# Patient Record
Sex: FEMALE | Race: WHITE | NOT HISPANIC OR LATINO | Employment: UNEMPLOYED | URBAN - METROPOLITAN AREA
[De-identification: names, ages, dates, MRNs, and addresses within clinical notes are randomized per-mention and may not be internally consistent; named-entity substitution may affect disease eponyms.]

---

## 2021-02-22 ENCOUNTER — APPOINTMENT (OUTPATIENT)
Dept: CT IMAGING | Facility: CLINIC | Age: 56
End: 2021-02-22
Attending: EMERGENCY MEDICINE
Payer: COMMERCIAL

## 2021-02-22 ENCOUNTER — HOSPITAL ENCOUNTER (INPATIENT)
Facility: CLINIC | Age: 56
LOS: 10 days | Discharge: HOME | End: 2021-03-04
Attending: EMERGENCY MEDICINE | Admitting: PSYCHIATRY & NEUROLOGY
Payer: COMMERCIAL

## 2021-02-22 DIAGNOSIS — R46.89 UNUSUAL CHANGE IN BEHAVIOR: ICD-10-CM

## 2021-02-22 DIAGNOSIS — Z11.52 ENCOUNTER FOR SCREENING LABORATORY TESTING FOR SEVERE ACUTE RESPIRATORY SYNDROME CORONAVIRUS 2 (SARS-COV-2): ICD-10-CM

## 2021-02-22 DIAGNOSIS — F29 PSYCHOSIS, UNSPECIFIED PSYCHOSIS TYPE (H): ICD-10-CM

## 2021-02-22 LAB
ALBUMIN SERPL-MCNC: 3.8 G/DL (ref 3.4–5)
ALBUMIN UR-MCNC: 10 MG/DL
ALP SERPL-CCNC: 67 U/L (ref 40–150)
ALT SERPL W P-5'-P-CCNC: 20 U/L (ref 0–50)
AMPHETAMINES UR QL SCN: NEGATIVE
ANION GAP SERPL CALCULATED.3IONS-SCNC: 9 MMOL/L (ref 3–14)
APPEARANCE UR: CLEAR
AST SERPL W P-5'-P-CCNC: 19 U/L (ref 0–45)
BACTERIA #/AREA URNS HPF: ABNORMAL /HPF
BARBITURATES UR QL: NEGATIVE
BASOPHILS # BLD AUTO: 0 10E9/L (ref 0–0.2)
BASOPHILS NFR BLD AUTO: 0.5 %
BENZODIAZ UR QL: NEGATIVE
BILIRUB SERPL-MCNC: 1.2 MG/DL (ref 0.2–1.3)
BILIRUB UR QL STRIP: NEGATIVE
BUN SERPL-MCNC: 13 MG/DL (ref 7–30)
CALCIUM SERPL-MCNC: 9 MG/DL (ref 8.5–10.1)
CANNABINOIDS UR QL SCN: POSITIVE
CHLORIDE SERPL-SCNC: 108 MMOL/L (ref 94–109)
CO2 SERPL-SCNC: 25 MMOL/L (ref 20–32)
COCAINE UR QL: NEGATIVE
COLOR UR AUTO: YELLOW
CREAT SERPL-MCNC: 0.84 MG/DL (ref 0.52–1.04)
DIFFERENTIAL METHOD BLD: NORMAL
EOSINOPHIL # BLD AUTO: 0.1 10E9/L (ref 0–0.7)
EOSINOPHIL NFR BLD AUTO: 0.8 %
ERYTHROCYTE [DISTWIDTH] IN BLOOD BY AUTOMATED COUNT: 11.9 % (ref 10–15)
ETHANOL SERPL-MCNC: <0.01 G/DL
ETHANOL UR QL SCN: NEGATIVE
GFR SERPL CREATININE-BSD FRML MDRD: 78 ML/MIN/{1.73_M2}
GLUCOSE SERPL-MCNC: 101 MG/DL (ref 70–99)
GLUCOSE UR STRIP-MCNC: NEGATIVE MG/DL
HCT VFR BLD AUTO: 44.6 % (ref 35–47)
HGB BLD-MCNC: 15.4 G/DL (ref 11.7–15.7)
HGB UR QL STRIP: NEGATIVE
HYALINE CASTS #/AREA URNS LPF: 3 /LPF (ref 0–2)
IMM GRANULOCYTES # BLD: 0 10E9/L (ref 0–0.4)
IMM GRANULOCYTES NFR BLD: 0.3 %
INR PPP: 1.02 (ref 0.86–1.14)
KETONES UR STRIP-MCNC: NEGATIVE MG/DL
LABORATORY COMMENT REPORT: NORMAL
LEUKOCYTE ESTERASE UR QL STRIP: NEGATIVE
LYMPHOCYTES # BLD AUTO: 1.9 10E9/L (ref 0.8–5.3)
LYMPHOCYTES NFR BLD AUTO: 25.6 %
MCH RBC QN AUTO: 32.7 PG (ref 26.5–33)
MCHC RBC AUTO-ENTMCNC: 34.5 G/DL (ref 31.5–36.5)
MCV RBC AUTO: 95 FL (ref 78–100)
MONOCYTES # BLD AUTO: 0.8 10E9/L (ref 0–1.3)
MONOCYTES NFR BLD AUTO: 10.6 %
MUCOUS THREADS #/AREA URNS LPF: PRESENT /LPF
NEUTROPHILS # BLD AUTO: 4.7 10E9/L (ref 1.6–8.3)
NEUTROPHILS NFR BLD AUTO: 62.2 %
NITRATE UR QL: NEGATIVE
NRBC # BLD AUTO: 0 10*3/UL
NRBC BLD AUTO-RTO: 0 /100
OPIATES UR QL SCN: NEGATIVE
PH UR STRIP: 5.5 PH (ref 5–7)
PLATELET # BLD AUTO: 306 10E9/L (ref 150–450)
POTASSIUM SERPL-SCNC: 3.5 MMOL/L (ref 3.4–5.3)
PROT SERPL-MCNC: 7.7 G/DL (ref 6.8–8.8)
RBC # BLD AUTO: 4.71 10E12/L (ref 3.8–5.2)
RBC #/AREA URNS AUTO: 1 /HPF (ref 0–2)
SARS-COV-2 RNA RESP QL NAA+PROBE: NEGATIVE
SODIUM SERPL-SCNC: 142 MMOL/L (ref 133–144)
SOURCE: ABNORMAL
SP GR UR STRIP: 1.02 (ref 1–1.03)
SPECIMEN SOURCE: NORMAL
SQUAMOUS #/AREA URNS AUTO: 4 /HPF (ref 0–1)
TSH SERPL DL<=0.005 MIU/L-ACNC: 1.36 MU/L (ref 0.4–4)
UROBILINOGEN UR STRIP-MCNC: NORMAL MG/DL (ref 0–2)
WBC # BLD AUTO: 7.6 10E9/L (ref 4–11)
WBC #/AREA URNS AUTO: 7 /HPF (ref 0–5)

## 2021-02-22 PROCEDURE — 80053 COMPREHEN METABOLIC PANEL: CPT | Performed by: EMERGENCY MEDICINE

## 2021-02-22 PROCEDURE — 250N000013 HC RX MED GY IP 250 OP 250 PS 637: Performed by: STUDENT IN AN ORGANIZED HEALTH CARE EDUCATION/TRAINING PROGRAM

## 2021-02-22 PROCEDURE — 70450 CT HEAD/BRAIN W/O DYE: CPT

## 2021-02-22 PROCEDURE — 81001 URINALYSIS AUTO W/SCOPE: CPT | Performed by: EMERGENCY MEDICINE

## 2021-02-22 PROCEDURE — 80320 DRUG SCREEN QUANTALCOHOLS: CPT | Performed by: EMERGENCY MEDICINE

## 2021-02-22 PROCEDURE — 85610 PROTHROMBIN TIME: CPT | Performed by: EMERGENCY MEDICINE

## 2021-02-22 PROCEDURE — 82077 ASSAY SPEC XCP UR&BREATH IA: CPT | Performed by: EMERGENCY MEDICINE

## 2021-02-22 PROCEDURE — 84443 ASSAY THYROID STIM HORMONE: CPT | Performed by: EMERGENCY MEDICINE

## 2021-02-22 PROCEDURE — 99285 EMERGENCY DEPT VISIT HI MDM: CPT | Mod: 25 | Performed by: EMERGENCY MEDICINE

## 2021-02-22 PROCEDURE — 87635 SARS-COV-2 COVID-19 AMP PRB: CPT | Performed by: EMERGENCY MEDICINE

## 2021-02-22 PROCEDURE — 99285 EMERGENCY DEPT VISIT HI MDM: CPT | Performed by: EMERGENCY MEDICINE

## 2021-02-22 PROCEDURE — 124N000002 HC R&B MH UMMC

## 2021-02-22 PROCEDURE — 85025 COMPLETE CBC W/AUTO DIFF WBC: CPT | Performed by: EMERGENCY MEDICINE

## 2021-02-22 PROCEDURE — 80307 DRUG TEST PRSMV CHEM ANLYZR: CPT | Performed by: EMERGENCY MEDICINE

## 2021-02-22 PROCEDURE — C9803 HOPD COVID-19 SPEC COLLECT: HCPCS | Performed by: EMERGENCY MEDICINE

## 2021-02-22 RX ORDER — ACETAMINOPHEN 325 MG/1
325-650 TABLET ORAL EVERY 6 HOURS PRN
COMMUNITY
End: 2021-02-22

## 2021-02-22 RX ORDER — MAGNESIUM HYDROXIDE/ALUMINUM HYDROXICE/SIMETHICONE 120; 1200; 1200 MG/30ML; MG/30ML; MG/30ML
30 SUSPENSION ORAL EVERY 4 HOURS PRN
Status: DISCONTINUED | OUTPATIENT
Start: 2021-02-22 | End: 2021-03-04 | Stop reason: HOSPADM

## 2021-02-22 RX ORDER — LANOLIN ALCOHOL/MO/W.PET/CERES
3 CREAM (GRAM) TOPICAL
Status: DISCONTINUED | OUTPATIENT
Start: 2021-02-22 | End: 2021-03-04 | Stop reason: HOSPADM

## 2021-02-22 RX ORDER — HYDROXYZINE HYDROCHLORIDE 25 MG/1
25 TABLET, FILM COATED ORAL EVERY 4 HOURS PRN
Status: DISCONTINUED | OUTPATIENT
Start: 2021-02-22 | End: 2021-03-04 | Stop reason: HOSPADM

## 2021-02-22 RX ORDER — IBUPROFEN 200 MG
200 TABLET ORAL EVERY 4 HOURS PRN
COMMUNITY
End: 2024-01-03

## 2021-02-22 RX ORDER — AMOXICILLIN 500 MG/1
500 CAPSULE ORAL 3 TIMES DAILY
Status: ON HOLD | COMMUNITY
End: 2021-03-04

## 2021-02-22 RX ORDER — OLANZAPINE 10 MG/2ML
10 INJECTION, POWDER, FOR SOLUTION INTRAMUSCULAR 3 TIMES DAILY PRN
Status: DISCONTINUED | OUTPATIENT
Start: 2021-02-22 | End: 2021-02-26

## 2021-02-22 RX ORDER — OLANZAPINE 10 MG/1
10 TABLET ORAL 3 TIMES DAILY PRN
Status: DISCONTINUED | OUTPATIENT
Start: 2021-02-22 | End: 2021-02-26

## 2021-02-22 RX ORDER — ACETAMINOPHEN 325 MG/1
650 TABLET ORAL EVERY 4 HOURS PRN
Status: DISCONTINUED | OUTPATIENT
Start: 2021-02-22 | End: 2021-03-04 | Stop reason: HOSPADM

## 2021-02-22 RX ADMIN — MELATONIN TAB 3 MG 3 MG: 3 TAB at 22:20

## 2021-02-22 RX ADMIN — HYDROXYZINE HYDROCHLORIDE 25 MG: 25 TABLET, FILM COATED ORAL at 22:20

## 2021-02-22 RX ADMIN — Medication 12.5 MG: at 23:43

## 2021-02-22 ASSESSMENT — ACTIVITIES OF DAILY LIVING (ADL)
WALKING_OR_CLIMBING_STAIRS_DIFFICULTY: YES
ADL_ASSESSMENT: WDL
TOILETING_ISSUES: NO
HEARING_DIFFICULTY_OR_DEAF: NO
CONCENTRATING,_REMEMBERING_OR_MAKING_DECISIONS_DIFFICULTY: NO
WEAR_GLASSES_OR_BLIND: YES
DRESSING/BATHING_DIFFICULTY: NO
DIFFICULTY_EATING/SWALLOWING: NO
WHICH_OF_THE_ABOVE_FUNCTIONAL_RISKS_HAD_A_RECENT_ONSET_OR_CHANGE?: COGNITION
DIFFICULTY_COMMUNICATING: NO
FALL_HISTORY_WITHIN_LAST_SIX_MONTHS: NO
DOING_ERRANDS_INDEPENDENTLY_DIFFICULTY: NO

## 2021-02-22 ASSESSMENT — ENCOUNTER SYMPTOMS
CONFUSION: 1
HYPERACTIVE: 1
HEADACHES: 1
SLEEP DISTURBANCE: 1
DECREASED CONCENTRATION: 1

## 2021-02-22 NOTE — ED PROVIDER NOTES
[facility] EMERGENCY DEPARTMENT ([facility])     February 22, 2021  History     Chief Complaint   Patient presents with     Insomnia     Manic Behavior     HPI  Radha James is a 55-year-old female who has a previous history of some depression in the remote past but has no previous psychiatric hospitalizations.  Patient lately has been acting strangely according to family members and friends.  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  Patient states that she is very good at math and art and she is in her Renaissance phase of her life.  Patient states she wants to semi-retire but is not old enough to completely retire yet.  Patient has been having some strange behavior lately in that she has gone to the neighbors in her bathrobe and has been expressing some strange thoughts for her... Alternate realities and different dimensions.  Patient admits that this behavior is unusual and thinks there might be something wrong with her and wants to get checked.  Patient does have a family history of bipolar disease and her brother does have manic episodes.  The patient has never been diagnosed with bipolar disease.  Patient denies any chemical dependency issues and states that she has had some mild headaches lately and maybe some blurred vision.  Patient agreed to come to the emergency department because family and friends expressed that she has been acting strangely.  The patient has exhibited rambling speech and has not been sleeping for the past week.       I have reviewed the Medications, Allergies, Past Medical and Surgical History, and Social History in the [facility] system.    PAST MEDICAL HISTORY: History reviewed. No pertinent past medical history.    PAST SURGICAL HISTORY: History reviewed. No pertinent surgical history.    Past medical history, past surgical history, medications, and allergies were reviewed with the  patient. Additional pertinent items: None    FAMILY HISTORY: History reviewed. No pertinent family history.    SOCIAL HISTORY:   Social History     Tobacco Use     Smoking status: Current Every Day Smoker     Types: Vaping Device     Smokeless tobacco: Never Used   Substance Use Topics     Alcohol use: Yes     Alcohol/week: 1.0 standard drinks     Types: 1 Shots of liquor per week     Comment: socially once a month     Social history was reviewed with the patient. Additional pertinent items: None      Patient's Medications    ACETAMINOPHEN (TYLENOL) 325 MG TABLET    Take 325-650 mg by mouth every 6 hours as needed for mild pain    AMOXICILLIN (AMOXIL) 500 MG CAPSULE    Take 500 mg by mouth 2 times daily    IBUPROFEN (ADVIL/MOTRIN) 200 MG TABLET    Take 200 mg by mouth every 4 hours as needed for mild pain        No Known Allergies     Review of Systems   Eyes: Positive for visual disturbance (blurred).   Neurological: Positive for headaches (mild).   Psychiatric/Behavioral: Positive for behavioral problems, confusion, decreased concentration and sleep disturbance. The patient is hyperactive.    All other systems reviewed and are negative.    A complete review of systems was performed with pertinent positives and negatives noted in the HPI, and all other systems negative.    Physical Exam   BP: (!) 172/91  Pulse: 76  Temp: 98.8  F (37.1  C)  Resp: 18  SpO2: 100 %      Physical Exam  Vitals signs and nursing note reviewed.   Constitutional:       Comments: Conversant pleasant but continues to ramble on   HENT:      Head: Atraumatic.   Eyes:      Extraocular Movements: Extraocular movements intact.      Pupils: Pupils are equal, round, and reactive to light.   Neck:      Musculoskeletal: Neck supple.   Cardiovascular:      Rate and Rhythm: Regular rhythm.      Heart sounds: Normal heart sounds.   Pulmonary:      Breath sounds: Normal breath sounds.   Abdominal:      Palpations: Abdomen is soft.      Tenderness: There  is no abdominal tenderness.   Musculoskeletal:         General: No deformity.   Skin:     General: Skin is warm.   Neurological:      General: No focal deficit present.      Mental Status: She is alert and oriented to person, place, and time.      Comments: Grossly intact and symmetric   Psychiatric:      Comments: Free spirited.  Almost flower-child like.         ED Course   1:40 PM  The patient was seen and examined by Reji Early MD in Room ED09.        Procedures          Patient agreed to a medical work-up.    Results for orders placed or performed during the hospital encounter of 02/22/21 (from the past 24 hour(s))   UA with Microscopic reflex to Culture    Specimen: Urine clean catch; Midstream Urine   Result Value Ref Range    Color Urine Yellow     Appearance Urine Clear     Glucose Urine Negative NEG^Negative mg/dL    Bilirubin Urine Negative NEG^Negative    Ketones Urine Negative NEG^Negative mg/dL    Specific Gravity Urine 1.023 1.003 - 1.035    Blood Urine Negative NEG^Negative    pH Urine 5.5 5.0 - 7.0 pH    Protein Albumin Urine 10 (A) NEG^Negative mg/dL    Urobilinogen mg/dL Normal 0.0 - 2.0 mg/dL    Nitrite Urine Negative NEG^Negative    Leukocyte Esterase Urine Negative NEG^Negative    Source Midstream Urine     WBC Urine 7 (H) 0 - 5 /HPF    RBC Urine 1 0 - 2 /HPF    Bacteria Urine Few (A) NEG^Negative /HPF    Squamous Epithelial /HPF Urine 4 (H) 0 - 1 /HPF    Mucous Urine Present (A) NEG^Negative /LPF    Hyaline Casts 3 (H) 0 - 2 /LPF   Drug abuse screen 6 urine (chem dep)   Result Value Ref Range    Amphetamine Qual Urine Negative NEG^Negative    Barbiturates Qual Urine Negative NEG^Negative    Benzodiazepine Qual Urine Negative NEG^Negative    Cannabinoids Qual Urine Positive (A) NEG^Negative    Cocaine Qual Urine Negative NEG^Negative    Ethanol Qual Urine Negative NEG^Negative    Opiates Qualitative Urine Negative NEG^Negative   CBC with platelets differential   Result Value Ref Range     WBC 7.6 4.0 - 11.0 10e9/L    RBC Count 4.71 3.8 - 5.2 10e12/L    Hemoglobin 15.4 11.7 - 15.7 g/dL    Hematocrit 44.6 35.0 - 47.0 %    MCV 95 78 - 100 fl    MCH 32.7 26.5 - 33.0 pg    MCHC 34.5 31.5 - 36.5 g/dL    RDW 11.9 10.0 - 15.0 %    Platelet Count 306 150 - 450 10e9/L    Diff Method Automated Method     % Neutrophils 62.2 %    % Lymphocytes 25.6 %    % Monocytes 10.6 %    % Eosinophils 0.8 %    % Basophils 0.5 %    % Immature Granulocytes 0.3 %    Nucleated RBCs 0 0 /100    Absolute Neutrophil 4.7 1.6 - 8.3 10e9/L    Absolute Lymphocytes 1.9 0.8 - 5.3 10e9/L    Absolute Monocytes 0.8 0.0 - 1.3 10e9/L    Absolute Eosinophils 0.1 0.0 - 0.7 10e9/L    Absolute Basophils 0.0 0.0 - 0.2 10e9/L    Abs Immature Granulocytes 0.0 0 - 0.4 10e9/L    Absolute Nucleated RBC 0.0    INR   Result Value Ref Range    INR 1.02 0.86 - 1.14   Comprehensive metabolic panel   Result Value Ref Range    Sodium 142 133 - 144 mmol/L    Potassium 3.5 3.4 - 5.3 mmol/L    Chloride 108 94 - 109 mmol/L    Carbon Dioxide 25 20 - 32 mmol/L    Anion Gap 9 3 - 14 mmol/L    Glucose 101 (H) 70 - 99 mg/dL    Urea Nitrogen 13 7 - 30 mg/dL    Creatinine 0.84 0.52 - 1.04 mg/dL    GFR Estimate 78 >60 mL/min/[1.73_m2]    GFR Estimate If Black >90 >60 mL/min/[1.73_m2]    Calcium 9.0 8.5 - 10.1 mg/dL    Bilirubin Total 1.2 0.2 - 1.3 mg/dL    Albumin 3.8 3.4 - 5.0 g/dL    Protein Total 7.7 6.8 - 8.8 g/dL    Alkaline Phosphatase 67 40 - 150 U/L    ALT 20 0 - 50 U/L    AST 19 0 - 45 U/L   TSH   Result Value Ref Range    TSH 1.36 0.40 - 4.00 mU/L   Alcohol ethyl   Result Value Ref Range    Ethanol g/dL <0.01 <0.01 g/dL   Head CT w/o contrast    Narrative    CT SCAN OF THE HEAD WITHOUT CONTRAST   2/22/2021 3:21 PM     HISTORY: Mental status change, unknown cause. Rambling speech.  Confusion.    TECHNIQUE:  Axial images of the head and coronal reformations without  IV contrast material.  Radiation dose for this scan was reduced using  automated exposure control,  adjustment of the mA and/or kV according  to patient size, or iterative reconstruction technique.    COMPARISON: None.    FINDINGS:  The ventricles are normal in size, shape and configuration.   The brain parenchyma and subarachnoid spaces are normal. There is no  evidence of intracranial hemorrhage, mass, acute infarct or anomaly.     The visualized portions of the sinuses and mastoids appear normal.  There is no evidence of trauma.      Impression    IMPRESSION: Normal CT scan of the head.      DESIREE FRAZIER MD           Assessments & Plan (with Medical Decision Making)     I have reviewed the nursing notes.    Medical work-up is pretty much negative.  Patient's behavior has been over a long enough time frame where something like mushroom ingestion or LSD is probably not the answer in the situation.  Patient exhibits some rambling speech and some delusional type thinking that may be consistent with lauren.  At this time the patient's case will be discussed with our behavioral emergency department and they will do a formal behavioral evaluation.    The case will be signed out to my partners will follow up with this evaluation and recommendations.      Working diagnoses:   Unusual change in behavior     Reji Early MD, MD      IKeyshawn, am serving as a trained medical scribe to document services personally performed by Reji Early Md, MD, based on the provider's statements to me.     IReji Md, MD, was physically present and have reviewed and verified the accuracy of this note documented by Keyshawn Elizondo.      2/22/2021   [facility] EMERGENCY DEPARTMENT     Reji Early MD  02/22/21 1547
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
## 2021-02-22 NOTE — ED TRIAGE NOTES
Pt arrives in her nightgown and has rambling speech about multiple topics but in summary; Pt has been having rambling speech, not sleeping the past few days, wondering over to neighbors in her [facility].  [facility] came to house yesterday to talk to the Pt and felt the Pt needed to come to the ED.

## 2021-02-23 LAB
CHOLEST SERPL-MCNC: 212 MG/DL
DEPRECATED CALCIDIOL+CALCIFEROL SERPL-MC: 19 UG/L (ref 20–75)
FOLATE SERPL-MCNC: 19.6 NG/ML
HCT VFR BLD AUTO: 44.8 % (ref 35–47)
HDLC SERPL-MCNC: 66 MG/DL
LDLC SERPL CALC-MCNC: 123 MG/DL
NONHDLC SERPL-MCNC: 146 MG/DL
TRIGL SERPL-MCNC: 114 MG/DL
VIT B12 SERPL-MCNC: 490 PG/ML (ref 193–986)

## 2021-02-23 PROCEDURE — 87798 DETECT AGENT NOS DNA AMP: CPT | Performed by: STUDENT IN AN ORGANIZED HEALTH CARE EDUCATION/TRAINING PROGRAM

## 2021-02-23 PROCEDURE — 85014 HEMATOCRIT: CPT | Performed by: STUDENT IN AN ORGANIZED HEALTH CARE EDUCATION/TRAINING PROGRAM

## 2021-02-23 PROCEDURE — 36415 COLL VENOUS BLD VENIPUNCTURE: CPT | Performed by: STUDENT IN AN ORGANIZED HEALTH CARE EDUCATION/TRAINING PROGRAM

## 2021-02-23 PROCEDURE — 82607 VITAMIN B-12: CPT | Performed by: STUDENT IN AN ORGANIZED HEALTH CARE EDUCATION/TRAINING PROGRAM

## 2021-02-23 PROCEDURE — 250N000013 HC RX MED GY IP 250 OP 250 PS 637: Performed by: STUDENT IN AN ORGANIZED HEALTH CARE EDUCATION/TRAINING PROGRAM

## 2021-02-23 PROCEDURE — 82746 ASSAY OF FOLIC ACID SERUM: CPT | Performed by: STUDENT IN AN ORGANIZED HEALTH CARE EDUCATION/TRAINING PROGRAM

## 2021-02-23 PROCEDURE — 80061 LIPID PANEL: CPT | Performed by: STUDENT IN AN ORGANIZED HEALTH CARE EDUCATION/TRAINING PROGRAM

## 2021-02-23 PROCEDURE — 99223 1ST HOSP IP/OBS HIGH 75: CPT | Mod: AI | Performed by: PSYCHIATRY & NEUROLOGY

## 2021-02-23 PROCEDURE — 124N000002 HC R&B MH UMMC

## 2021-02-23 PROCEDURE — 86617 LYME DISEASE ANTIBODY: CPT | Performed by: STUDENT IN AN ORGANIZED HEALTH CARE EDUCATION/TRAINING PROGRAM

## 2021-02-23 PROCEDURE — 82306 VITAMIN D 25 HYDROXY: CPT | Performed by: STUDENT IN AN ORGANIZED HEALTH CARE EDUCATION/TRAINING PROGRAM

## 2021-02-23 RX ADMIN — QUETIAPINE 75 MG: 25 TABLET, FILM COATED ORAL at 20:59

## 2021-02-23 RX ADMIN — OLANZAPINE 10 MG: 10 TABLET, FILM COATED ORAL at 14:30

## 2021-02-23 RX ADMIN — ACETAMINOPHEN 650 MG: 325 TABLET, FILM COATED ORAL at 22:32

## 2021-02-23 ASSESSMENT — ACTIVITIES OF DAILY LIVING (ADL)
ORAL_HYGIENE: INDEPENDENT
HYGIENE/GROOMING: INDEPENDENT
DRESS: INDEPENDENT
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
## 2021-02-23 NOTE — PHARMACY-ADMISSION MEDICATION HISTORY
"  Admission Medication History Completed by Pharmacy    See [facility] Admission Navigator for allergy information, preferred outpatient pharmacy, prior to admission medications and immunization status.     Medication History Sources:     Patient    Patient's spouse    Dispense History    Changes made to PTA medication list (reason):    Added:  o Medical cannabis (\"taking in vapor form received in [place]\" per patient & spouse)    Deleted:  o Tylenol 325mg tablet (discontinued because prefers Advil for pain per patient)    Changed:  o Amoxicillin 500mg capsule 2 times daily --> 3 times daily (per patient, spouse, & dispense history)    Additional Information:    Please assess patient's use of vaporized medical cannabis.    Prior to Admission medications    Medication Sig Last Dose Taking? Auth Provider   amoxicillin (AMOXIL) 500 MG capsule Take 500 mg by mouth 3 times daily  2/22/2021 at am Yes Reported, Patient   ibuprofen (ADVIL/MOTRIN) 200 MG tablet Take 200 mg by mouth every 4 hours as needed for mild pain 2/21/2021 at pm Yes Reported, Patient   medical cannabis (Patient's own supply) Take 1 Dose by mouth See Admin Instructions (The purpose of this order is to document that the patient reports taking medical cannabis.  This is not a prescription, and is not used to certify that the patient has a qualifying medical condition.) Unknown Yes Reported, Patient             Date completed: 02/22/21    Medication history completed by: Nathaly Satrr            "

## 2021-02-23 NOTE — ED NOTES
Patient was signed out to me by the prior provider Dr. Reji Early pending behavioral health assessment.  Please see his note for full history and physical exam and prior emergency department course.  At the time of signout she was pending behavioral health assessment and had been deemed medically stable for psychiatric admission by prior provider.  Patient was seen by the behavioral health  and it was agreed that the patient warranted admission for inpatient psychiatric stabilization.  I spoke with the patient and her  who are both in agreement with this plan.  Patient is voluntary at this time and happy with the plan for admission.  She was transferred to the psychiatric floor in stable condition.    MD Julissa Kelley Ashley A, MD  02/22/21 [id number]

## 2021-02-23 NOTE — PROGRESS NOTES
Patient was awake most of the shift. Slept for 1.5 hours. Patient came out of room x 1 and was writing with fingers on another patient's room door. Patient was easily redirected back to room. Patient appeared to be responding to internal stimuli. Otherwise, no other complaints or concerns voiced by patient or noted by staff. Will continue to monitor and update if there are concerns.
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
## 2021-02-23 NOTE — PLAN OF CARE
BEHAVIORAL TEAM DISCUSSION    Participants:   Jonathon Carter MD Attending Psychiatrist  Gregor Michelle MD, PGY1  Phuong Ortega, MSW, [facility] Clinical Treatment Coordinator  Nicol Otoole, RN  Beverly Sales, MS3  Filiberto Pedersen, MS3  Estrellita, Pharmacy Student  Progress: initial team meeting  Anticipated length of stay: assessment is ongoing  Continued Stay Criteria/Rationale: admitted for treatment / assessment of recent change in thinking / behaviors - symptoms concerning for lauren.   Medical/Physical: medically cleared for admission to mental health   Per psychiatry physician H&P:  Medical diagnoses to be addressed this admission:    # concern for tic borne disease  -lyme, anaplasma in process  Precautions:   Behavioral Orders   Procedures    Code 1 - Restrict to Unit    Routine Programming     As clinically indicated    Status 15     Every 15 minutes.     Plan: Psychiatric assessment. Medication management. Therapeutic Milieu. Individual care planning and after care planning. Patient to participate in unit groups and activities. Individual and group support on unit.  Ongoing medical assessment to occur given noted concern for tickborne diseases given report of recent insect bite while vacationing in [place]- Lyme and neoplasm in process  Rationale for change in precautions or plan: per initial assessment.

## 2021-02-23 NOTE — PROGRESS NOTES
"This pt was observed talking and holding hands with another pt in the dining room this morning. Staff intervened to explain to both pts that this physical boundary needed to be respected, and this pt initially was accepting of this redirection. However, writer observed this pt putting her hands on the other pt's shoulders/arms after this discussion. Writer walked over to redirect this behavior and noticed this pt was trying to give her number to the other pt. Writer explained to this pt that she could not give out any personal information, and needed to not touch other pts. She was pleasant, cooperative while receiving this redirection, however she stated to the other pt \"Don't worry, I will find a way around this limitation\" when the other pt appeared sad/confused by redirection from writer--writer reminded this pt not to give any personal info to the other pt. After this discussion, this pt and the other appeared to adhere to redirection from writer and staff. Writer and staff will continue to monitor interactions between these pts.   "

## 2021-02-23 NOTE — PLAN OF CARE
Work Completed  - chart review  - team meeting  - team rounds/pt interview   - post team rounds team meeting / discussion  - initial psychosocial assessment completed   - behavioral team discussion noted completed for plan of care  - individualized plan of care documentation of strengths and areas of vulnerability     Discharge plan or goal  Assessment is ongoing    Barriers to discharge  Assessment is ongoing for medical issues that could be cause of new onset of acute lauren in a 55 year old with no significant psychiatric history  Medication management and assessment is ongoing for targeted symptom improvement.

## 2021-02-23 NOTE — PROGRESS NOTES
"    ----------------------------------------------------------------------------------------------------------  [facility], [place]   Psychiatric Progress Note  Hospital Day #1     Interim History:   The patient's care was discussed with the treatment team and chart notes were reviewed.    Sleep: 1.5 hours (02/23/21 0600)  Scheduled Medications: taken as prescribed - seroquel 12.5mg  PRN Medications: hydroxyzine, melatonin    Staff Report: Pt was cooperative with search and zoom interview with the doctor. Hyperverbal and rambling speech.       Patient Interview: Patient was interviewed in her personal room. Today Radha reports that she is doing great. She explains that her symptoms that brought her into the hospital started 2.5 weeks ago on 2/4/21. She was unable to elaborate further. She reports that she has been having an issue with her mother, her \"core issue\", that issue being that she feels like she is turning into her mother. Also mentions that her mother was born on groundshog day. 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 She quit her \"soul-sucking\" job over the weekend.     Radha reports that her  brought her to the hospital because he was concerned about her behavior. She reports that she has never experienced a manic episode, although her brother has bipolar disorder. She disputes the diagnosis of lauren, and finds it \"counter-intuitive\". Has has experienced depression, \"for the past 54 years\".     Jose was called for collateral: [phone]. He reports that Radha's behavior started to change about 2.5 weeks ago. 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 On 2/5, she asked her  for permission to quit her job, and he said that she could quit it and they would figure out " "how to manage with only one income. Both the  and her boss then started to see a change in her behavior. On 2/11 she started getting \"lots of ideas\". On 2/17-18 she started experiencing \"loose associations\" and racing thoughts.  On 2/19 the patient took a mental health day from work which she had never done before.  And the  disconnected the Internet.  Over the weekend the patient was watching TV and she thought that it was talking directly to her.  The  called 911 after the patient experienced severe paranoia on the day of admission.   reports that Radha has never experienced symptoms of lauren in the past although her brother was diagnosed with bipolar as a teen.  Radha vapes THC.  She started amoxicillin over the weekend and was prescribed it by her dentist after reporting tooth pain.   is not aware of any discrete trigger to her manic episode.      The risks, benefits, alternatives and side effects of any medication changes have been discussed and are understood by the patient and other caregivers.    Review of systems:     ROS was negative unless noted above.          Allergies:   No Known Allergies         Psychiatric Examination:   /76   Pulse 82   Temp 98.1  F (36.7  C) (Oral)   Resp 18   SpO2 98%   Weight is 0 lbs 0 oz  There is no height or weight on file to calculate BMI.    Appearance:  awake, alert, adequately groomed, dressed in hospital scrubs and appeared as age stated  Attitude:  cooperative  Eye Contact:  good  Mood:  \"good\"  Affect:  mood congruent, intensity is heightened, intensity is dramatic, labile, guarded, full range and reactive  Speech:  clear, coherent, decreased prosody, pressured speech and rambling  Psychomotor Behavior:  no evidence of tardive dyskinesia, dystonia, or tics, physical agitation and much gesticulation  Thought Process:  disorganized, tangental and flight of ideas  Associations:  loosening of associations present  Thought " Content:  no evidence of suicidal ideation or homicidal ideation  Insight:  limited  Judgment:  limited  Oriented to:  limited  Attention Span and Concentration:  poor  Recent and Remote Memory:  limited  Language: fluent English with proper syntax, clang association  Fund of Knowledge: appropriate  Muscle Strength and Tone: appears normal  Gait and Station: normal gait, intact station         Labs:     Results for orders placed or performed during the hospital encounter of 02/22/21 (from the past 24 hour(s))   Lipid panel   Result Value Ref Range    Cholesterol 212 (H) <200 mg/dL    Triglycerides 114 <150 mg/dL    HDL Cholesterol 66 >49 mg/dL    LDL Cholesterol Calculated 123 (H) <100 mg/dL    Non HDL Cholesterol 146 (H) <130 mg/dL   Folate   Result Value Ref Range    Folate 19.6 >5.4 ng/mL   Hematocrit   Result Value Ref Range    Hematocrit 44.8 35.0 - 47.0 %   Vitamin B12   Result Value Ref Range    Vitamin B12 490 193 - 986 pg/mL   Vitamin D   Result Value Ref Range    Vitamin D Deficiency screening 19 (L) 20 - 75 ug/L        Assessment    Principal Diagnosis:   # Phuong and psychosis    Secondary psychiatric diagnoses of concern this admission:   # cannabis use disorder  # tobacco use disorder    Diagnostic Impression:   Radha James is a 55 year old  White female previously diagnosed with MDD who presents with elevated mood, bizarre behavior, and decreased sleep requirements in the context of job stress. Substances are not likely a contributing factor to their presentation though she has ongoing nicotine and cannabis use has not changed use behavior for some years.  Biological contributions to mental health presentation include family history of bipolar disorder and ongoing substance use. Psychosocial factors contributing to current presentation include work stress and pandemic circumstances.                The patient's presentation is consistent with phuong, first episode though other diagnoses are  under consideration. It is unclear if this episode is due to primary mood disorder or secondary to another cause. Collateral from  supports that this is in fact her first episode as she states. If due to bipolar disorder, it is suprising she has not had manic episodes prior to the age of 55. Other possible etiologies include antibiotic-induced lauren (unlikely due to antibiotic initation after florid lauren), lauren episode due to another medical condition (menopause unlikely as patient >1 year from last menstrual period, tickborne disease possible considering recent insect bit in warmer climate), and delirium (possibly in the setting of tooth or urinary infection, patient denies any symptoms at this time and is afebrile though had subjective fevers recently and urine sample in ED was + for casts and bacteria though contaminated with squamous cells). Given this, plan to hold amoxicillin (only recent medication change that could correlate with symptoms), repeat urinalysis, and offered seroquel 12.5mg this evening for sleep. Will also add titers for common Floridain tickborne diseases given recent travel and possible bite.     Hospital course: Radha James was admitted to station 22 as a voluntary patient. She was not on psychotropics PTA, and initiated quetiapine for sleep, which was titrated to 75mg at night.      Discontinued Medications (& Rationale):    Medical course Ms. James was medically cleared prior to admission. Amoxicillin was held due to being unnecessary.     Plan   Today's changes:  -increase quetiapine to 75mg qHS    Psychotropic Medications:  Scheduled Meds:  -Quetiapine 75mg qHS    PRN Meds:  -hydrOXYzine 25mg q4H, for anxiety  -Melatonin 3mg at bedtime, for sleep  -nicorette 4mg, for tobacco cessation  -OLANZapine PO/IM 10mg, for severe agitation      Psychosocial:  -Patient will be treated in therapeutic milieu with appropriate individual and group therapies as described.      Medical  diagnoses to be addressed this admission:    # concern for tic borne disease  -lyme, anaplasma in process    Data: see above  Consults: none    Legal Status: Voluntary    Safety Assessment:   Behavioral Orders   Procedures     Code 1 - Restrict to Unit     Routine Programming     As clinically indicated     Status 15     Every 15 minutes.       Disposition: Pending stabilization & development of a safe discharge plan.    The patient was seen and discussed with the attending physician, Dr. Jonathon Carter.    Gregor Michelle MD, PhD  PGY-1 Psychiatry Resident      Attestation:  I, Jonathon Carter MD, have personally performed an examination of this patient and I have reviewed the resident's documentation.  I have edited the note to reflect all relevant changes.  I have discussed this patient with the house staff on 2/23/2021.  I agree with resident findings and plan in today's resident H&P.  I have reviewed all vitals and laboratory findings.      I certifiy that the inpatient services were ordered in accordance with the Medicare regulations governing the order. This includes certification that hospital inpatient services are reasonable and necessary and in the case of services not specified as inpatient-only under 42 .22(n), that they are appropriately provided as inpatient services in accordance with the 2-midnight benchmark under 42 .3(e).     The reason for inpatient status is Bipolar Disorder.    Jonathon Carter M.D.,Ph.D.

## 2021-02-23 NOTE — PROGRESS NOTES
Admission Note:    Admitted voluntarily a 55 year old from ED for lauren and insomnia. Per , Pt is still actively employed although Pt says she quit. She has had poor sleep and only sleeps for about 2 - 3 hours a night. Pt went to her neighbors house in her bathrobe and told them that her cat is going to cure Covid and sent texts to her dentist in the middle of the night although nothing was wrong with her teeth.  Hx of Depression and Anxiety. Per notes, Pt has no prior Psychiatric hospitalizations. Pt has been acting strangely according to family. Pt has a family history of Bipolar and her brother has manic episodes. Denies chemical dependency.    Upon admission, Pt was cooperative with search and zoom interview with the doctor. Hyperverbal and rambling speech. When asked why she is admitted states, I had a blow up with an employee because of decisions they made with no regard to employees. States her  was concerned of her poor sleep because she slept on/off without getting into deep sleep. States she works at a  [employer] and the job is stressful. Pt received prn Hydroxyzine and Melatonin for sleep.

## 2021-02-23 NOTE — PROGRESS NOTES
02/22/21 2025   Patient Belongings   Did you bring any home meds/supplements to the hospital?  No   Patient Belongings locker   Patient Belongings Put in Hospital Secure Location (Security or Locker, etc.) other (see comments);clothing;shoes   Belongings Search Yes   Clothing Search Yes   Second Staff Sandra       In Locker: Boots, bathrobe, egg shaped tan stone, summer dress with attached pin.    A               Admission:  I am responsible for any personal items that are not sent to the safe or pharmacy.  [facility] is not responsible for loss, theft or damage of any property in my possession.    Signature:  _________________________________ Date: _______  Time: _____                                              Staff Signature:  ____________________________ Date: ________  Time: _____      2nd Staff person, if patient is unable/unwilling to sign:    Signature: ________________________________ Date: ________  Time: _____     Discharge:  [facility] has returned all of my personal belongings:    Signature: _________________________________ Date: ________  Time: _____                                          Staff Signature:  ____________________________ Date: ________  Time: _____
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
## 2021-02-23 NOTE — PROGRESS NOTES
Cross Cover Note    No roommate order placed per conversation with RN given the severity of Ms. James's lauren.    Hilda Goldstein MD  Psychiatry PGY-2
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
## 2021-02-24 LAB — B BURGDOR IGG SER QL IB: NEGATIVE

## 2021-02-24 PROCEDURE — G0177 OPPS/PHP; TRAIN & EDUC SERV: HCPCS

## 2021-02-24 PROCEDURE — 99232 SBSQ HOSP IP/OBS MODERATE 35: CPT | Mod: GC | Performed by: PSYCHIATRY & NEUROLOGY

## 2021-02-24 PROCEDURE — 250N000013 HC RX MED GY IP 250 OP 250 PS 637: Performed by: STUDENT IN AN ORGANIZED HEALTH CARE EDUCATION/TRAINING PROGRAM

## 2021-02-24 PROCEDURE — 124N000002 HC R&B MH UMMC

## 2021-02-24 RX ORDER — OLANZAPINE 15 MG/1
15 TABLET ORAL AT BEDTIME
Status: DISCONTINUED | OUTPATIENT
Start: 2021-02-24 | End: 2021-02-25

## 2021-02-24 RX ADMIN — MELATONIN TAB 3 MG 3 MG: 3 TAB at 01:06

## 2021-02-24 RX ADMIN — OLANZAPINE 15 MG: 15 TABLET, FILM COATED ORAL at 20:17

## 2021-02-24 RX ADMIN — ACETAMINOPHEN 650 MG: 325 TABLET, FILM COATED ORAL at 20:17

## 2021-02-24 RX ADMIN — OLANZAPINE 10 MG: 10 TABLET, FILM COATED ORAL at 01:06

## 2021-02-24 ASSESSMENT — ACTIVITIES OF DAILY LIVING (ADL)
DRESS: INDEPENDENT;SCRUBS (BEHAVIORAL HEALTH)
DRESS: INDEPENDENT
HYGIENE/GROOMING: INDEPENDENT
ORAL_HYGIENE: INDEPENDENT
ORAL_HYGIENE: INDEPENDENT
LAUNDRY: WITH SUPERVISION
HYGIENE/GROOMING: INDEPENDENT
LAUNDRY: WITH SUPERVISION
HYGIENE/GROOMING: INDEPENDENT
LAUNDRY: WITH SUPERVISION
DRESS: INDEPENDENT
ORAL_HYGIENE: INDEPENDENT

## 2021-02-24 NOTE — PLAN OF CARE
INITIAL OT NOTE  Problem: OT General Care Plan  Goal: OT Goal 1    Pt attended 2 out of 3 OT groups offered. Pt actively participated in a structured occupational therapy group of 4 patients total x55 minutes involving a self-reflecting, group leisure task. Pt appeared comfortable sharing positive past experiences and personal information with peers, and was respectful in listening and responding to peers. Responses to prompts were appropriate to topic, though hyperverbal. She spoke about having a positive relationship with one of her brothers. Pt actively participated in occupational therapy clinic with 6 patients total x60 minutes. Pt spent a majority of group playing with task materials rather than engaging in a goal-directed task, appearing distractible. Socialized with peers throughout group, particularly an intrusive female peer. She managed this peer's intrusiveness well, and was tolerant and pleasant in interactions with this peer. Pleasant, cooperative, and engaged throughout groups today. Affect appeared excessively bright at times. Will continue to assess. Pt will be given self-assessment form, and OT staff will explain the purpose of including them in their treatment plan and offer options for meeting their needs. Initial assessment to be completed upon additional group participation.

## 2021-02-24 NOTE — PLAN OF CARE
Work Completed  - chart review  - team meeting  - team rounds/pt interview via Zoom - Attending and Resident psychiatry present in person, CTC, medical students and pharmacy student via Zoom      Discharge plan or goal  Assessment ongoing    Barriers to discharge  Medication management ongoing for targeted symptom improvement

## 2021-02-24 NOTE — PROGRESS NOTES
"    ----------------------------------------------------------------------------------------------------------  [facility], [place]   Psychiatric Progress Note  Hospital Day #2     Interim History:   The patient's care was discussed with the treatment team and chart notes were reviewed.    Sleep: 0.75 hours (02/24/21 0550)  Scheduled Medications: Seroquel  PRN Medications: Zyprexa, Melatonin, Tylenol    Staff Report: Patient was hyperverbal and talking continuously throughout the day. She was intrusive with others. She socialized extensively with peers and staff and spoke on the phone at length. Yesterday evening patient slid from her socks and fell to the floor hitting her head on the wall. Nursing evaluator and the on-call resident was called. Nursing reported that patient had no bumps on her head her pupils were reactive to light and her mental status was unchanged. Fall precautions were initiated. Additionally patient displayed intrusive behavior toward appear last evening and SIO 1:1 was initiated.      Patient Interview: Patient was interviewed in her personal room. Radha says that she is feeling \"fine\" today. She states that she slept \"a good amount\" last night, although staff reports patient slept about 0.75 hours. Radha says she feels tired today, but doesn't want a medication that \"messes with my head\" and \"makes me sleep all day.\" She is reluctant about medications, stating that \"I did not need any medicine for the first 55 years of my life.\" She expresses that she would prefer a \"holistic approach\" to treatment, and this is why she prefers to use THC.    When asked about her fall last night, Radha says that she slipped and fell. She says her hand hurt after the fall, but does not report any current pain. Radha denied experiencing any lightheadedness prior to the fall, or any ongoing symptoms such as headache. Patient does not report any obvious signs of orthostatic " "hypotension at this time.    Radha also states that \"she does not like when doctors make all the decisions.\" She went on to say that if she senses this \"I'll walk away mentally.\"    Patient's  Jose ([phone]) was reached to provide an update, as requested. He was informed [facility] recent fall, her lack of sleep overnight, and the change in medications from Seroquel to Zyprexa tonight. He relayed Radha mention to him pain in her legs and that she would like to talk to her Medicine physician.    The risks, benefits, alternatives and side effects of any medication changes have been discussed and are understood by the patient and other caregivers.    Review of systems:     ROS was negative unless noted above.          Allergies:   No Known Allergies         Psychiatric Examination:   /76   Pulse 94   Temp 97.8  F (36.6  C) (Oral)   Resp 18   SpO2 98%   Weight is 0 lbs 0 oz  There is no height or weight on file to calculate BMI.    Appearance:  awake, alert, adequately groomed, dressed in hospital scrubs and appeared as age stated  Attitude:  cooperative  Eye Contact:  better  Mood:  \"I'm feeling fine\"  Affect:  mood congruent, intensity is exaggerated, intensity is dramatic and full range  Speech:  clear, coherent, pressured speech and rambling  Psychomotor Behavior:  no evidence of tardive dyskinesia, dystonia, or tics  Thought Process:  disorganized and tangental  Associations:  loosening of associations present  Thought Content:  no evidence of suicidal ideation or homicidal ideation  Insight:  limited  Judgment:  limited  Oriented to:  time, person, and place  Attention Span and Concentration:  limited  Recent and Remote Memory:  limited  Language: fluent English with appropriate syntax  Fund of Knowledge: advanced  Muscle Strength and Tone: normal  Gait and Station: Normal         Labs:   No results found for this or any previous visit (from the past 24 hour(s)).         Assessment  "   Principal Diagnosis:   # Phuong with psychosis    Secondary psychiatric diagnoses of concern this admission:   # Cannabis use disorder  # Tobacco use disorder    Diagnostic Impression:   Radha James is a 55 year old  White female previously diagnosed with MDD who presents with elevated mood, bizarre behavior, and decreased sleep requirements in the context of job stress. Substances are not likely a contributing factor to their presentation, though she has ongoing nicotine and cannabis use. She has not changed use behavior for some years.  Biological contributions to mental health presentation include family history of bipolar disorder and ongoing substance use. Psychosocial factors contributing to current presentation include work stress and pandemic circumstances.    The patient's presentation is consistent with new onset phuong, though other diagnoses are under consideration. It is unclear if this episode is due to primary mood disorder or secondary to another cause. Both patient remarks and collateral from  support that this is her first episode. If due to bipolar disorder, it is suprising she has not had manic episodes prior to the age of 55. Other possible etiologies include antibiotic-induced phuong (unlikely due to antibiotic initation after florid phuong), phuong episode due to another medical condition (menopause unlikely as patient >1 year from last menstrual period, tickborne disease (recent insect bite in warmer climate), and delirium (possibly in the setting of tooth or urinary infection, but patient denies any symptoms at this time and is afebrile though had subjective fevers recently and urine sample in ED was + for casts and bacteria though contaminated with squamous cells). Given this, plan to hold amoxicillin (only recent medication change that could correlate with symptoms), repeat urinalysis, and continue to use sleep aids. Will also add titers for common Floridain tickborne  diseases given recent travel and possible bite.     Hospital course: Radha James was admitted to station 22 as a voluntary patient. She was not on psychotropics PTA, and initiated quetiapine for sleep, which was titrated to 75mg at night.  Quetiapine was held after Radha experienced a fall and orthostatic hypotension, and zyprexa was scheduled at bedtime.     Discontinued Medications (& Rationale):    Medical course: Patient was medically cleared prior to admission. Amoxicillin was held due to being unnecessary. Patient experienced a mechanical fall after slipping due to socks. She bumped head, but on assessment had no change in mental status or appreciable bruise/bump, and her pupils were reactive to light.    Plan   Today's changes:  - Hold Quetiapine at bedtime  - Start OLANZapine to 15 mg at bedtime    Psychotropic Medications:  Scheduled Meds:  Quetiapine 75 mg at bedtime - HELD  OLANZapine 15 mg at bedtime      PRN Meds:  HydrOXYzine 25mg q4H, for anxiety  Melatonin 3mg at bedtime, for sleep  Nicorette 4mg, for tobacco cessation  OLANZapine IM 10mg, for severe agitation      Patient will be treated in therapeutic milieu with appropriate individual and group therapies as described.      Medical diagnoses to be addressed this admission:    # Concern for tick-borne diseases; lyme and anaplasma in process      Data: See above  Consults: None    Legal Status: Voluntary    Safety Assessment:   Behavioral Orders   Procedures     Code 1 - Restrict to Unit     Fall precautions     Routine Programming     As clinically indicated     Status 15     Every 15 minutes.     Status Individual Observation     Patient SIO status reviewed with team/RN.  Please also refer to RN/team documentation for add'l detail.    -SIO staff to monitor following which have contributed to patient being on SIO:  Intrusiveness (e.g. going up to other patient s doors at night)  -Possible interventions SIO staff could use to support patient's  treatment progress:  Verbal redirection  -When following observed, team will review discontinuation of SIO:  Decrease in intrusive behaviors     Order Specific Question:   CONTINUOUS 24 hours / day     Answer:   5 feet     Order Specific Question:   Indications for SIO     Answer:   Severe intrusiveness       Disposition: 1-2 weeks, Pending stabilization, medication management & development of a safe discharge plan.    The patient was seen and discussed with the attending physician, Dr. Jonathon Carter.    Indio Pedersen, MS3    I was present with the medical student who participated in the service and in the documentation of the note. I have verified the history and medical decision making. I agree with the assessment and plan of care as documented in the note.     Gregor Michelle MD, PhD  PGY-1 Psychiatry Resident      Attestation:  This patient has been seen and evaluated by me, Jonathon Carter MD.  I have discussed this patient with the house staff team including the resident and medical student and I agree with the findings and plan in this note.    I have reviewed today's vital signs, medications, labs and imaging. Jonathon Carter MD , PhD.

## 2021-02-24 NOTE — PROGRESS NOTES
Pt had a fall in her room, stated she slid because of her socks. Pt is wearing non skid socks. Pt unable to state where she was going or doing prior to fall. She appears confused, tired and manic.  Found lying on her R side near base of her bed. WILFRED. No visible injuries noted. Pt able to walk to the bathroom independently.  C/o headache, tylenol 650mg administered. Ice applied to back of head. On call updated. VSS but elevated Pulse of 120. Fall precautions in place. After about five minutes, Pt came out of her room and walked down the vu trying to open other Pt's doors and being intrusive. Pt stood there without moving, a code was called for safety. Pt able to walk back to her room. Rambling and hyperverbal, refused to stay in her room and now in lounge. NP has placed Pt on SIO for intrusiveness.

## 2021-02-24 NOTE — PLAN OF CARE
Problem: Behavioral Health Plan of Care  Goal: Adheres to Safety Considerations for Self and Others  Outcome: No Change    Patient hyperverbal talking continuously throughout the shift.  Pacing up and down and sometimes intrusive with others .  Attended a group. Staff continue to redirect Pt to maintain boundaries. No profanities heard this shift. During check in, Pt states she does not feel safe on the unit and wants to transfer to another floor. A few other Pts said the same thing as they are talking to each other. Encouraged to voice unmet needs. Assisted to take a shower. Denies mental health symptoms including SI/SIB/AVH.  Ate dinner and took her meds.

## 2021-02-24 NOTE — PROGRESS NOTES
Pt awake most of this shift rambling and hyper verbal. Pt does not make sense at times and jumps from one topic to another. Appears confused. Used the bathroom severally this shift. When encouraged to give urine specimen she states she threw the container away. A hat has been placed on her toilet and specimen will be collected as Pt allows. Continues on SIO staff for intrusiveness. Fall pads are by bedside. Slept for about 0.75 hours.

## 2021-02-24 NOTE — PROGRESS NOTES
Cross Cover Note    Subjective  Notified by RN that Ms. James slipped and fell in her room. She was found on the floor near her bed. Ms. James is reporting pain in her hand.     Objective  BP (!) 140/100   Pulse 121   Temp 97.9  F (36.6  C) (Oral)   Resp 16   SpO2 97%     Per RN:  - No bumps on Ms. James's head.  - Pupils are reactive to light.   - Mental status relatively unchanged (maybe slightly more tired after taking evening dose of quetiapine).    Assessment & Plan  Ms. James is a 55-year-old woman currently hospitalized for lauren who slipped and fell this evening. History not concerning for orthostasis, although she did recently start quetiapine, so blood pressure should continue to be monitored. No significant neurological changes noted by RN, therefore low concern for head trauma due to fall.  - Fall precautions ordered  - Pain relief with acetaminophen and ice (for hand)    Hilda Goldstein MD  Psychiatry PGY-2

## 2021-02-24 NOTE — PROGRESS NOTES
Continues to present hyper verbal, intrusive at times. Tangential at times. Redirection at times. Impulsive inappropriate comments at times.

## 2021-02-24 NOTE — PROGRESS NOTES
Pt had an okay shift. Pt is visible and present in the milieu throughout the evening, socialized extensively with peers and staff and spoke on the phone at length. Pt presents with a bright, full range affect and an elevated mood. Pt presents with severe intrusiveness and is not easily redirected. Pt can be difficult to track at times and presents with tangential speech/thoughts and very loose associations. Pt attended to ADLs, took a shower. Pt's intrusiveness can be concerning at times, was observed touching other pts on the arms and being inappropriate with speech when interacting with peers. Writer will continue to monitor.

## 2021-02-24 NOTE — PROGRESS NOTES
0115 pt very anxious, pounding on walls and manic, restless and hyperverbal difficult to redirect. Zyprexa and Melatonin PRN administered. SIO staff staff continues to encourage pt to relax as well as redirect pt at this time. Team continues to monitor pt.

## 2021-02-25 PROCEDURE — 250N000013 HC RX MED GY IP 250 OP 250 PS 637: Performed by: STUDENT IN AN ORGANIZED HEALTH CARE EDUCATION/TRAINING PROGRAM

## 2021-02-25 PROCEDURE — G0177 OPPS/PHP; TRAIN & EDUC SERV: HCPCS

## 2021-02-25 PROCEDURE — 99232 SBSQ HOSP IP/OBS MODERATE 35: CPT | Mod: GC | Performed by: PSYCHIATRY & NEUROLOGY

## 2021-02-25 PROCEDURE — 124N000002 HC R&B MH UMMC

## 2021-02-25 RX ORDER — AMOXICILLIN 250 MG
1 CAPSULE ORAL 2 TIMES DAILY PRN
Status: DISCONTINUED | OUTPATIENT
Start: 2021-02-25 | End: 2021-03-04 | Stop reason: HOSPADM

## 2021-02-25 RX ORDER — OLANZAPINE 5 MG/1
5 TABLET ORAL 2 TIMES DAILY
Status: DISCONTINUED | OUTPATIENT
Start: 2021-02-25 | End: 2021-03-03

## 2021-02-25 RX ORDER — POLYETHYLENE GLYCOL 3350 17 G/17G
17 POWDER, FOR SOLUTION ORAL DAILY PRN
Status: DISCONTINUED | OUTPATIENT
Start: 2021-02-25 | End: 2021-03-04 | Stop reason: HOSPADM

## 2021-02-25 RX ORDER — OLANZAPINE 20 MG/1
20 TABLET ORAL AT BEDTIME
Status: DISCONTINUED | OUTPATIENT
Start: 2021-02-25 | End: 2021-02-26

## 2021-02-25 RX ADMIN — OLANZAPINE 20 MG: 20 TABLET, FILM COATED ORAL at 20:59

## 2021-02-25 RX ADMIN — ACETAMINOPHEN 650 MG: 325 TABLET, FILM COATED ORAL at 05:15

## 2021-02-25 RX ADMIN — MELATONIN TAB 3 MG 3 MG: 3 TAB at 01:04

## 2021-02-25 RX ADMIN — OLANZAPINE 5 MG: 5 TABLET, FILM COATED ORAL at 15:23

## 2021-02-25 ASSESSMENT — ACTIVITIES OF DAILY LIVING (ADL)
HYGIENE/GROOMING: INDEPENDENT
DRESS: INDEPENDENT
ORAL_HYGIENE: INDEPENDENT
LAUNDRY: WITH SUPERVISION

## 2021-02-25 NOTE — PROGRESS NOTES
"    ----------------------------------------------------------------------------------------------------------  [facility], [place]   Psychiatric Progress Note  Hospital Day #3     Interim History:   The patient's care was discussed with the treatment team and chart notes were reviewed.    Sleep: 1.25 hours (02/25/21 0557)  Scheduled Medications: Zyprexa   PRN Medications: Senna-docusate, Melatonin, Tylenol    Staff Report:     Patient Interview: Radha was interviewed in her personal room. Interview was brief, approximately 1 minute in length due to patient becoming agitated. When asked about how she was feeling, Radha reported feeling \"fine\", but had trouble hearing follow-up question via Zoom. When IPad was moved closer to patient, Radha said that team members were doing \"superficial acting\" and animatedly told team to \"get out right now.\" Team left at patient's request.    In separate interview, Radha was pointing at scribbles on the wall, saying that they were written by a secret abuser.    The risks, benefits, alternatives and side effects of any medication changes have been discussed and are understood by the patient and other caregivers.    Review of systems:     ROS was negative unless noted above.          Allergies:   No Known Allergies         Psychiatric Examination:   /83 (BP Location: Right arm)   Pulse 88   Temp 95  F (35  C) (Tympanic)   Resp 16   SpO2 96%   Weight is 0 lbs 0 oz  There is no height or weight on file to calculate BMI.    Appearance:  awake, alert, dressed in hospital scrubs and appeared as age stated  Attitude:  uncooperative, agitated, posturing  Eye Contact:  poor   Mood:  \"Fine\"  Affect: threatening;  mood incongruent, intensity is heightened, intensity is dramatic and full range  Speech:  clear, coherent, pressured speech and rambling  Psychomotor Behavior:  no evidence of tardive dyskinesia, dystonia, or tics  Thought Process:  disorganized " and tangental  Associations:  loosening of associations present  Thought Content:  no evidence of suicidal ideation or homicidal ideation  Insight:  limited  Judgment:  limited  Oriented to:  time, person, and place  Attention Span and Concentration:  poor  Recent and Remote Memory:  limited  Language: fluent English with appropriate syntax  Fund of Knowledge: advanced  Muscle Strength and Tone: normal  Gait and Station: Normal         Labs:     Lyme IgG negative     Assessment    Principal Diagnosis:   # Phuong with psychosis    Secondary psychiatric diagnoses of concern this admission:   # Cannabis use disorder  # Tobacco use disorder    Diagnostic Impression:   Radha James is a 55 year old  White female previously diagnosed with MDD who presents with elevated mood, bizarre behavior, and decreased sleep requirements in the context of job stress. Substances are not likely a contributing factor to their presentation, though she has ongoing nicotine and cannabis use. She has not changed use behavior for some years.  Biological contributions to mental health presentation include family history of bipolar disorder and ongoing substance use. Psychosocial factors contributing to current presentation include work stress and pandemic circumstances.    The patient's presentation is consistent with new onset phuong, though other diagnoses are under consideration. It is unclear if this episode is due to primary mood disorder or secondary to another cause. Both patient remarks and collateral from  support that this is her first episode. If due to bipolar disorder, it is suprising she has not had manic episodes prior to the age of 55. Other possible etiologies include antibiotic-induced phuong (unlikely due to antibiotic initation after florid phuong), phuong episode due to another medical condition (menopause unlikely as patient >1 year from last menstrual period, tickborne disease (recent insect bite in warmer  climate), and delirium (possibly in the setting of tooth or urinary infection, but patient denies any symptoms at this time and is afebrile though had subjective fevers recently and urine sample in ED was + for casts and bacteria though contaminated with squamous cells). Given this, plan to hold amoxicillin (only recent medication change that could correlate with symptoms), repeat urinalysis, and continue to use sleep aids. Will also add titers for common Floridain tickborne diseases given recent travel and possible bite.     Hospital course: Radha James was admitted to station 22 as a voluntary patient. She was not on psychotropics PTA, and initiated quetiapine for sleep, which was titrated to 75mg at night.  Quetiapine was held after Radha experienced a fall and orthostatic hypotension, and zyprexa was scheduled at bedtime.     Discontinued Medications (& Rationale):    Medical course: Patient was medically cleared prior to admission. Amoxicillin was held due to being unnecessary. Patient experienced a mechanical fall after slipping due to socks. She bumped head, but on assessment had no change in mental status or appreciable bruise/bump, and her pupils were reactive to light.    Plan   Today's changes:  - Increase OLANZapine to 20 mg at bedtime  - Start OLANZapine 5 mg twice during the day  - PRN Senna-docusate for constipation    Psychotropic Medications:  Scheduled Meds:  Quetiapine 75 mg at bedtime - HELD  OLANZapine 20 mg at bedtime  OLANZapine 5 mg twice during the day      PRN Meds:  HydrOXYzine 25mg q4H, for anxiety  Melatonin 3mg at bedtime, for sleep  Nicorette 4mg, for tobacco cessation  OLANZapine IM 10mg, for severe agitation  Senna-docusate for constipation      Patient will be treated in therapeutic milieu with appropriate individual and group therapies as described.      Medical diagnoses to be addressed this admission:    # Concern for tick-borne diseases   - Lyme IgG negative  - Anaplasma  in process      Data: See above  Consults: None    Legal Status: Voluntary    Safety Assessment:   Behavioral Orders   Procedures     Code 1 - Restrict to Unit     Fall precautions     Routine Programming     As clinically indicated     Status 15     Every 15 minutes.     Status Individual Observation     Patient SIO status reviewed with team/RN.  Please also refer to RN/team documentation for add'l detail.    -SIO staff to monitor following which have contributed to patient being on SIO:  Intrusiveness (e.g. going up to other patient s doors at night)  -Possible interventions SIO staff could use to support patient's treatment progress:  Verbal redirection  -When following observed, team will review discontinuation of SIO:  Decrease in intrusive behaviors     Order Specific Question:   CONTINUOUS 24 hours / day     Answer:   Other     Order Specific Question:   Specify distance     Answer:   10 feet     Order Specific Question:   Indications for SIO     Answer:   Severe intrusiveness       Disposition: 1-2 weeks, Pending stabilization, medication management & development of a safe discharge plan.    The patient was seen and discussed with the attending physician, Dr. Jonathon Carter.    Indio Pedersen, MS3    I was present with the medical student who participated in the service and in the documentation of the note. I have verified the history and medical decision making. I agree with the assessment and plan of care as documented in the note.     Gregor Michelle MD, PhD  PGY-1 Psychiatry Resident      Attestation:  This patient has been seen and evaluated by me, Jonathon Carter MD.  I have discussed this patient with the house staff team including the resident and medical student and I agree with the findings and plan in this note.    I have reviewed today's vital signs, medications, labs and imaging. Jonathon Carter MD , PhD.

## 2021-02-25 NOTE — PROGRESS NOTES
Radha requested prns for sleep and constipation at 0115.  Denies any emergent s/sxs.  Melatonin given for sleep and prune juice with soda given for the constipation.  No prns for constipation ordered at this time.  Will pass on this information regarding the constipation issue to day staff.  Remains on the SIO staffing and encouraged patient to increase her fluid intake.    Radha appeared to only sleep 1.25 hours this night shift.  She had a difficult time falling to sleep and a difficult time remaining asleep.  Remains somewhat manic with singing and loudly talking in her room at times during the night.  Pleasant and redirectable though.  Remains on 1:1 staffing.    At approximately 0530, Radha requested something for a headache.  Tylenol 650 mg given.  Will continue to monitor and support patient.  Will reassess headache with no emergent s/sxs.

## 2021-02-25 NOTE — PLAN OF CARE
Work Completed  - chart review  - team meeting  - team rounds/pt interview via  ipad / [employer] myriam - Resident psychiatrist in person, CTC, Medical Students and Pharmacy Intern via [employer]-  During attempted interview patient quickly became agitated yelling for resident psychiatrist and team to get out-provider did exit.  Attending psychiatrist onsite later met with her individually see psychiatry physician progress note for any details  - post team rounds team meeting / discussion      Discharge plan or goal  Assessment is ongoing -at minimal would benefit from a referral for psychiatry provider at follow-up    Barriers to discharge  Acute symptoms of lauren and psychosis remain present requiring ongoing medication management and assessment    This note was created by undersigned using a Dragon dictation system. All typing errors or contextual distortion are unintentional and software inherent.  Phuong Ortega, BENNY, LICSW

## 2021-02-25 NOTE — PLAN OF CARE
"Group Psychotherapy     Topic: Mental Illness Awareness - Patient watched a short video on various mental health diagnoses followed by a discussion.     Hours: 1.0     Response: Patient actively participated in mental health group. She was very pleasant in all interactions. Calm, cooperative, and engaged throughout. Affect appeared to brighten in interactions. Attention span seemed appropriate.      Patient said that she and another patient had similarities and the two of them continued to talk after group. Patient left the group once asking if it was okay to do so. She came back within a couple minutes and shut the door leaving her SIO to sit outside the door. She stated that it was \"rough.\"  Patient mentioned feeling threatened by male staff earlier in the day.      Facilitator: Eva Ortega, LPCC, LADC  "

## 2021-02-25 NOTE — PLAN OF CARE
Pt briefly attended the Therapeutic Recreation group this evening. Pt stayed long enough for the introductions and a brief discussion. As the instructions were being explained, pt stated she was not feeling well and decided to leave group for the remainder of the time.

## 2021-02-25 NOTE — PLAN OF CARE
Patient pleasant and cooperative, tense and irritable at times.  Bright affect, denies SI/SIB, mental health symptoms.  Attended group meetings and participated in activities.  Continue to be on SIO for intrusive behavior.  Good appetite at breakfast and lunch.  No aggressive behavior noted, will continue to monitor closely.
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
## 2021-02-25 NOTE — PLAN OF CARE
Problem: Adult Inpatient Plan of Care  Goal: Plan of Care Review  Outcome: No Change  Flowsheets (Taken 2/24/2021 1700)  Plan of Care Reviewed With: patient    Pt continues to present with hyperverbal, tangential, flight of ideas and pressured speech throughout the shift. Pt also has been in an elated mood. Pt has been very social with staff and peers. Pt was in/out of the milieu for the majority of the shift. No stated SI/SIB or hallucinations. Pt has been cooperative and pleasant, although restless at times.

## 2021-02-25 NOTE — PLAN OF CARE
Problem: Behavior Regulation Impairment (Psychotic Signs/Symptoms)  Goal: Improved Behavioral Control (Psychotic Signs/Symptoms)  Outcome: No Change  Note: Patient suddenly began to run down the vu and had to be stopped by staff. When meeting with team, jumped up and charged towards resident. She is seen looking into other patient's rooms and requires redirection. Attempting to intrude on peer's phone call. Poor physical and verbal boundaries. She was opening her bathroom door stating she wanted to make sure no one was in there. (patient has a private room) Went to community meeting, became agitated and left abruptly. Made comments about staff bullying her and another peer. Could not elaborate on that statement. She is med compliant. Denies SI and SIB. Kathryn Stovall RN

## 2021-02-25 NOTE — PROGRESS NOTES
At about 0800 pt began intruding on peer who was angry, responding to internal stim with profanity. Resistant initially to redirection. Remind pt to concentrate on her issues and let unit staff work with other pt. Radha presented suspicious of staff redirection.    1154 pt began walking up to other pt doors and looking in their room, pt presents suspicious and resistant to redirection. Walked up to another pt on the phone and began to intrude.    Constant redirection to not touch other peers and maintain good boundaries.
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
## 2021-02-26 PROCEDURE — 99232 SBSQ HOSP IP/OBS MODERATE 35: CPT | Mod: GC | Performed by: PSYCHIATRY & NEUROLOGY

## 2021-02-26 PROCEDURE — 250N000013 HC RX MED GY IP 250 OP 250 PS 637: Performed by: STUDENT IN AN ORGANIZED HEALTH CARE EDUCATION/TRAINING PROGRAM

## 2021-02-26 PROCEDURE — H2032 ACTIVITY THERAPY, PER 15 MIN: HCPCS

## 2021-02-26 PROCEDURE — 124N000002 HC R&B MH UMMC

## 2021-02-26 RX ORDER — OLANZAPINE 15 MG/1
30 TABLET ORAL AT BEDTIME
Status: DISCONTINUED | OUTPATIENT
Start: 2021-02-26 | End: 2021-03-04 | Stop reason: HOSPADM

## 2021-02-26 RX ORDER — HALOPERIDOL 5 MG/ML
5 INJECTION INTRAMUSCULAR EVERY 6 HOURS PRN
Status: DISCONTINUED | OUTPATIENT
Start: 2021-02-26 | End: 2021-03-04 | Stop reason: HOSPADM

## 2021-02-26 RX ORDER — HALOPERIDOL 5 MG/1
5 TABLET ORAL EVERY 6 HOURS PRN
Status: DISCONTINUED | OUTPATIENT
Start: 2021-02-26 | End: 2021-03-04 | Stop reason: HOSPADM

## 2021-02-26 RX ORDER — LORAZEPAM 1 MG/1
1 TABLET ORAL
Status: DISCONTINUED | OUTPATIENT
Start: 2021-02-26 | End: 2021-03-04 | Stop reason: HOSPADM

## 2021-02-26 RX ADMIN — OLANZAPINE 5 MG: 5 TABLET, FILM COATED ORAL at 09:25

## 2021-02-26 RX ADMIN — HYDROXYZINE HYDROCHLORIDE 25 MG: 25 TABLET, FILM COATED ORAL at 00:26

## 2021-02-26 RX ADMIN — OLANZAPINE 5 MG: 5 TABLET, FILM COATED ORAL at 14:53

## 2021-02-26 RX ADMIN — MELATONIN TAB 3 MG 3 MG: 3 TAB at 00:26

## 2021-02-26 RX ADMIN — OLANZAPINE 30 MG: 15 TABLET, FILM COATED ORAL at 20:26

## 2021-02-26 NOTE — PROGRESS NOTES
"Pt active in the milieu today, attending/participating in groups, social with peers and staff. Overall pt appeared in a good mood this shift, smiling/laughing and joking with peers and staff. Pt brightens on approach, shows full range affect, cooperative and pleasant towards staff. Pt described feeling more tired than usual today, as well as experiencing dry mouth frequently. During a game of [employer] she stated she is unhappy with having to take \"western medication\" to help manage symptoms, but remains willing to continue trying them. Pt slept most of the day after lunch, writer unable to check in further with pt.  "

## 2021-02-26 NOTE — PLAN OF CARE
"Work Completed  - chart review  - team meeting  - team rounds/pt interview via Zoom - Attending and Resident Psychiatrists present in person , CTC, Medical Students and Pharmacy Intern via zoom.   Today she reports feeling very groggy from the medication. That she finally slept some last night after making a bed on the floor because the mattresses here in the hospital are bad. At times seems frustrated with ongoing recommendation to stay in the hospital - she mentions being apprehensive to take medications especially if they can be hard to be on. references comedian Edd Colon  stand up about being scared to have to stop taking antidepressants if the apocalypse ever comes do to fear of discontinuation side effects. She asked how long the team wants her to be in the hospital \"are we talking days, years , what?\" . Resident psychiatrist discussed that would be ongoing day to day assessment. This writer also spoke with her about anticipating that she will be here on the unit minimally into next week. Later provider spoke with her about targeted improvement of sleep before discharge readiness.   - post team rounds team meeting / discussion        Discharge plan or goal  assessment ongoing    Barriers to discharge  Medication management ongoing for targeted symptom improvement.     "

## 2021-02-26 NOTE — PROGRESS NOTES
"    ----------------------------------------------------------------------------------------------------------  [facility], [place]   Psychiatric Progress Note  Hospital Day #4     Interim History:   The patient's care was discussed with the treatment team and chart notes were reviewed.    Sleep: 2 hours  Scheduled Medications: Zyprexa TAP  PRN Medications: Senna-docusate, Melatonin, Tylenol    Staff Report: Slept for very short periods throughout the night. Pt would move around in her room without purpose. Pt twice lied down in water she spill and asked for staff members to give her hand to stand up.    Patient Interview: Radha was interviewed in her personal room. Radha says she feels \"fine\", although she does state that the medication [OLANZapine] she received this morning \"gave me a dry mouth and made me drowsy.\" She reports that she has always been reluctant to take medications, because she is worried that they will not always be available. She referenced an excerpt by comedian Edd Colon to further explain her worry about medications. She stated that she does not intend to continue these medications after discharge.    She says she slept \"fine\" last night; staff reports Radha slept about 2 hours last night. Radha also states that the beds are not comfortable, and that she only slept after making a homemade bed on the floor last night. When told that 6 hours of sleep per night would be optimal, Radha said she would put her \"lazy hat\" on to try to sleep for 5 or 6 hours.    She is also curious about how long she will be staying here, stating that she could \"do Zoom meetings at home, if that's what is needed\". She denies feeling unsafe or uncomfortable in the hospital.    Patient's  Jose ([phone]) was reached to provide an update. He said he spoke to Radha two times today, and he is unsure \"how long I can convince her to stay in the hospital.\" He hopes she is able to " "get back to normal sleep before returning home.    The risks, benefits, alternatives and side effects of any medication changes have been discussed and are understood by the patient and other caregivers.    Review of systems:     ROS was negative unless noted above.          Allergies:   No Known Allergies         Psychiatric Examination:   /82 (BP Location: Left arm)   Pulse 80   Temp 97.1  F (36.2  C) (Tympanic)   Resp 16   SpO2 98%   Weight is 0 lbs 0 oz  There is no height or weight on file to calculate BMI.    Appearance:  awake, alert, dressed in hospital scrubs and appeared as age stated  Attitude:  more cooperative, agitated  Eye Contact:  good  Mood:  \"Fine\"  Affect: threatening;  mood incongruent, intensity is exaggerated, intensity is dramatic and full range  Speech:  clear, coherent and pressured speech  Psychomotor Behavior:  no evidence of tardive dyskinesia, dystonia, or tics  Thought Process:  disorganized and tangental  Associations:  loosening of associations present  Thought Content:  no evidence of suicidal ideation or homicidal ideation  Insight:  fair  Judgment:  fair  Oriented to:  time, person, and place  Attention Span and Concentration:  poor  Recent and Remote Memory:  limited  Language: fluent English with appropriate syntax  Fund of Knowledge: advanced  Muscle Strength and Tone: normal  Gait and Station: Normal         Labs:     Lyme IgG negative     Assessment    Principal Diagnosis:   # Phuong with psychosis    Secondary psychiatric diagnoses of concern this admission:   # Cannabis use disorder  # Tobacco use disorder    Diagnostic Impression:   Radha James is a 55 year old  White female previously diagnosed with MDD who presents with elevated mood, bizarre behavior, and decreased sleep requirements in the context of job stress. Substances are not likely a contributing factor to their presentation, though she has ongoing nicotine and cannabis use. She has not " changed use behavior for some years.  Biological contributions to mental health presentation include family history of bipolar disorder and ongoing substance use. Psychosocial factors contributing to current presentation include work stress and pandemic circumstances.    The patient's presentation is consistent with new onset lauren, though other diagnoses are under consideration. It is unclear if this episode is due to primary mood disorder or secondary to another cause. Both patient remarks and collateral from  support that this is her first episode. If due to bipolar disorder, it is suprising she has not had manic episodes prior to the age of 55. Other possible etiologies include antibiotic-induced lauren (unlikely due to antibiotic initation after florid lauren), lauren episode due to another medical condition (menopause unlikely as patient >1 year from last menstrual period, tickborne disease (recent insect bite in warmer climate), and delirium (possibly in the setting of tooth or urinary infection, but patient denies any symptoms at this time and is afebrile though had subjective fevers recently and urine sample in ED was + for casts and bacteria though contaminated with squamous cells). Given this, plan to hold amoxicillin (only recent medication change that could correlate with symptoms), repeat urinalysis, and continue to use sleep aids. Will also add titers for common Floridain tickborne diseases given recent travel and possible bite.     Hospital course: Radha James was admitted to station 22 as a voluntary patient. She was not on psychotropics PTA, and initiated quetiapine for sleep, which was titrated to 75mg at night.  Quetiapine was discontinued after Radha experienced a fall and orthostatic hypotension, and zyprexa was scheduled at bedtime. Zyprexa titrated to 5mg BID and 30mg at bedtime.    Discontinued Medications (& Rationale):  Quetiapine - orthostatic hypotension    Medical course:  Patient was medically cleared prior to admission. Amoxicillin was held due to being unnecessary. Patient experienced a mechanical fall after slipping due to socks. She bumped head, but on assessment had no change in mental status or appreciable bruise/bump, and her pupils were reactive to light.    Plan   Today's changes:  - Increase OLANZapine to 30 mg at bedtime  - Start Ativan 1mg at bedtime PRN  - Renew SIO    Psychotropic Medications:  Scheduled Meds:  OLANZapine 30 mg at bedtime  OLANZapine 5 mg twice during the day       PRN Meds:  HydrOXYzine 25mg q4H, for anxiety  Melatonin 3mg at bedtime, for sleep  Nicorette 4mg, for tobacco cessation  Haloperidol IM/PO 5mg, for severe agitation  Senna-docusate for constipation  Ativan 1mg for sleep      Patient will be treated in therapeutic milieu with appropriate individual and group therapies as described.      Medical diagnoses to be addressed this admission:    # Concern for tick-borne diseases   - Lyme IgG negative  - Anaplasma in process      Data: See above  Consults: None    Legal Status: Voluntary    Safety Assessment:   Behavioral Orders   Procedures     Code 1 - Restrict to Unit     Fall precautions     Routine Programming     As clinically indicated     Status 15     Every 15 minutes.     Status Individual Observation     Patient SIO status reviewed with team/RN.  Please also refer to RN/team documentation for add'l detail.    -SIO staff to monitor following which have contributed to patient being on SIO:  Intrusiveness (e.g. going up to other patient s doors at night)  -Possible interventions SIO staff could use to support patient's treatment progress:  Verbal redirection  -When following observed, team will review discontinuation of SIO:  Decrease in intrusive behaviors     Order Specific Question:   CONTINUOUS 24 hours / day     Answer:   Other     Order Specific Question:   Specify distance     Answer:   10 feet     Order Specific Question:   Indications  for SIO     Answer:   Severe intrusiveness     Order Specific Question:   Indications for SIO     Answer:   Assault risk       Disposition: 1-2 weeks, Pending stabilization, medication management & development of a safe discharge plan.    The patient was seen and discussed with the attending physician, Dr. Jonathon Carter.    Indio Pedersen, MS3    I was present with the medical student who participated in the service and in the documentation of the note. I have verified the history and medical decision making. I agree with the assessment and plan of care as documented in the note.     Gregor Michelle MD, PhD  PGY-1 Psychiatry Resident      Attestation:  This patient has been seen and evaluated by me, Jonathon Carter MD.  I have discussed this patient with the house staff team including the resident and medical student and I agree with the findings and plan in this note.    I have reviewed today's vital signs, medications, labs and imaging. Jonathon Carter MD , PhD.

## 2021-02-26 NOTE — PLAN OF CARE
"  Problem: Cognitive Impairment (Psychotic Signs/Symptoms)  Goal: Optimal Cognitive Function (Psychotic Signs/Symptoms)  Outcome: No Change  Note: Patient continues with poor physical boundaries and requires redirection. Restless when out in milieu. Accepting redirection. She had only 2 hours of sleep last night. Was able to rest awhile this afternoon. During a game of [employer] she stated she is unhappy with having to take \"western medication\" to help manage symptoms, but remains willing to continue trying them. When offered her scheduled Zyprexa, she looked at her SIO staff and said \"you are like a father to me\" Looked at RN and said \"you are like a mother... well mother earth... Remember that [employer] commercial? Its not nice to fool mother nature.. How do you pronounce this again? Zib Zab a do? Zabadabado? Zib Zab a do?\" She then swallowed the med and said \"coming to a theatre near you\". Kathryn Stovall RN       "

## 2021-02-26 NOTE — PROGRESS NOTES
"Pt has been delusionally verbalizing thoughts,flat of ideas. Pt twice lied down in water she spill and asked for staff members to give her hand to stand up. Inquiring what's happening, \" I want Magnus to lift me up and walk with me\". Pt was checked by nursing team per protocol and no signs of injury present. Pt ate dinner, napped often and engaged with staff team in conversations with staff team.     Pt presents disorganized thoughts and acutely animated in conversation. Benefiting from redirections at times during this shift. Pt is in her room at this time with no further behavioral concerns.       "

## 2021-02-26 NOTE — PROGRESS NOTES
Pt was awake at the beginning of this shift. Slept for very short periods throughout the night. Pt would move around in her room without purpose. Received prn Melatonin and Hydroxyzine with little or no effect. Continues with manic like behavior, observed talking loud at times. Pt unable to stay asleep for long and required several redirections to lay down. Continues on SIO for disorganized behavior. Pt slept for about 2.0 hours.

## 2021-02-27 LAB
A PHAGOCYTOPH DNA BLD QL NAA+PROBE: NOT DETECTED
E CHAFFEENSIS DNA BLD QL NAA+PROBE: NOT DETECTED
E EWINGII DNA SPEC QL NAA+PROBE: NOT DETECTED
EHRLICHIA DNA SPEC QL NAA+PROBE: NOT DETECTED

## 2021-02-27 PROCEDURE — 250N000013 HC RX MED GY IP 250 OP 250 PS 637: Performed by: STUDENT IN AN ORGANIZED HEALTH CARE EDUCATION/TRAINING PROGRAM

## 2021-02-27 PROCEDURE — 124N000002 HC R&B MH UMMC

## 2021-02-27 RX ORDER — POLYETHYLENE GLYCOL 3350 17 G
4 POWDER IN PACKET (EA) ORAL
Status: DISCONTINUED | OUTPATIENT
Start: 2021-02-27 | End: 2021-03-04 | Stop reason: HOSPADM

## 2021-02-27 RX ADMIN — NICOTINE POLACRILEX 4 MG: 2 LOZENGE ORAL at 17:07

## 2021-02-27 RX ADMIN — OLANZAPINE 5 MG: 5 TABLET, FILM COATED ORAL at 08:22

## 2021-02-27 RX ADMIN — OLANZAPINE 30 MG: 15 TABLET, FILM COATED ORAL at 19:29

## 2021-02-27 RX ADMIN — OLANZAPINE 5 MG: 5 TABLET, FILM COATED ORAL at 14:30

## 2021-02-27 ASSESSMENT — ACTIVITIES OF DAILY LIVING (ADL)
ORAL_HYGIENE: INDEPENDENT
HYGIENE/GROOMING: INDEPENDENT
DRESS: SCRUBS (BEHAVIORAL HEALTH);INDEPENDENT

## 2021-02-27 NOTE — PROGRESS NOTES
Pt social in milieu throughout shift with bright affect. Pt seemed to have some confusion and disorganization early in the shift, at one point telling another patient that she believed one of her best friends from [facility] in [place] was related to them. Pt was however largely organized and able to carry on intelligent conversation as shift went on. Pt did not require any redirection this shift. Pt went to sleep around 2130, although only sleeping for a few minutes at a time and seemed frustrated that she was unable to stay asleep.

## 2021-02-27 NOTE — PLAN OF CARE
Pt attended a structured Therapeutic Recreation group this evening. Pt actively participated in a guided chair exercise routine as a strategy to facilitate therapeutic exercise, calming, and stress management. Pt actively followed all the movements and remained attentive and engaged throughout group. Pt verbalized feeling good at the end of group. Pt contributed at least one idea to a discussion at the end of group regarding the benefits of exercise, stretching, and deep breathing. Pt seemed to really enjoy the exercise and said she could use that routine multiple times throughout the day.

## 2021-02-27 NOTE — PLAN OF CARE
"\"I'm not manic, I never thought I was manic. Maybe I was a toe over. The filter was off. Is there a better name for manic other then manic?\" Denies depression, \"sometimes my mood is low, sad.\" Patient states sometimes she can't remember something she did or said 2 minutes earlier. Anxiety is intermittent. Attends groups and is social with peers. Patient was tangential during check in. Patient is less hyper verbal and has less pressured speech today.   "

## 2021-02-27 NOTE — PROGRESS NOTES
"Patient was interactive with staff and other patients as a result of spending a majority of her day in the milieu. She stated her mood today is \"Cheerful and sleepy.\" Patient complained of right leg pain which she described as \"A dull aching pain that doesn't feel good to put pressure on\" while rating the overall pain as a 5/10. Patient denied having any confusion as well as SI/SIB.   "

## 2021-02-27 NOTE — PROGRESS NOTES
Radha appears to have slept a total of 5 hours this night shift.  Remains on SIO staffing.  No prns or snacks given or requested.  UA/UC still needs to be collected.

## 2021-02-27 NOTE — PLAN OF CARE
Pt hah a good evening . No abnormal behaviors noticed . 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 Med compliant

## 2021-02-28 PROCEDURE — 250N000013 HC RX MED GY IP 250 OP 250 PS 637: Performed by: STUDENT IN AN ORGANIZED HEALTH CARE EDUCATION/TRAINING PROGRAM

## 2021-02-28 PROCEDURE — 250N000013 HC RX MED GY IP 250 OP 250 PS 637: Performed by: PEDIATRICS

## 2021-02-28 PROCEDURE — 99207 PR CONSULT E&M CHANGED TO SUBSEQUENT LEVEL: CPT | Performed by: PHYSICIAN ASSISTANT

## 2021-02-28 PROCEDURE — 124N000002 HC R&B MH UMMC

## 2021-02-28 PROCEDURE — H2032 ACTIVITY THERAPY, PER 15 MIN: HCPCS

## 2021-02-28 PROCEDURE — 250N000013 HC RX MED GY IP 250 OP 250 PS 637: Performed by: PHYSICIAN ASSISTANT

## 2021-02-28 PROCEDURE — 99232 SBSQ HOSP IP/OBS MODERATE 35: CPT | Performed by: PHYSICIAN ASSISTANT

## 2021-02-28 RX ORDER — DIPHENHYDRAMINE HCL 50 MG
50 CAPSULE ORAL EVERY 6 HOURS PRN
Status: DISCONTINUED | OUTPATIENT
Start: 2021-02-28 | End: 2021-03-04 | Stop reason: HOSPADM

## 2021-02-28 RX ORDER — DIPHENHYDRAMINE HCL 25 MG
25 CAPSULE ORAL EVERY 6 HOURS PRN
Status: DISCONTINUED | OUTPATIENT
Start: 2021-02-28 | End: 2021-02-28

## 2021-02-28 RX ORDER — FAMOTIDINE 10 MG
20 TABLET ORAL 2 TIMES DAILY PRN
Status: DISCONTINUED | OUTPATIENT
Start: 2021-02-28 | End: 2021-03-04 | Stop reason: HOSPADM

## 2021-02-28 RX ORDER — CHLORHEXIDINE GLUCONATE ORAL RINSE 1.2 MG/ML
15 SOLUTION DENTAL 2 TIMES DAILY
Status: DISCONTINUED | OUTPATIENT
Start: 2021-02-28 | End: 2021-03-04 | Stop reason: HOSPADM

## 2021-02-28 RX ADMIN — DIPHENHYDRAMINE HYDROCHLORIDE 50 MG: 50 CAPSULE ORAL at 05:37

## 2021-02-28 RX ADMIN — NICOTINE POLACRILEX 4 MG: 2 LOZENGE ORAL at 19:03

## 2021-02-28 RX ADMIN — ACETAMINOPHEN 650 MG: 325 TABLET, FILM COATED ORAL at 10:35

## 2021-02-28 RX ADMIN — CHOLECALCIFEROL TAB 10 MCG (400 UNIT) 10 MCG: 10 TAB at 18:17

## 2021-02-28 RX ADMIN — NICOTINE POLACRILEX 4 MG: 2 LOZENGE ORAL at 09:53

## 2021-02-28 RX ADMIN — OLANZAPINE 5 MG: 5 TABLET, FILM COATED ORAL at 13:56

## 2021-02-28 RX ADMIN — OLANZAPINE 5 MG: 5 TABLET, FILM COATED ORAL at 09:09

## 2021-02-28 RX ADMIN — NICOTINE POLACRILEX 4 MG: 2 LOZENGE ORAL at 16:44

## 2021-02-28 RX ADMIN — CHLORHEXIDINE GLUCONATE 0.12% ORAL RINSE 15 ML: 1.2 LIQUID ORAL at 19:03

## 2021-02-28 RX ADMIN — OLANZAPINE 30 MG: 15 TABLET, FILM COATED ORAL at 20:34

## 2021-02-28 ASSESSMENT — ACTIVITIES OF DAILY LIVING (ADL)
DRESS: SCRUBS (BEHAVIORAL HEALTH)
DRESS: SCRUBS (BEHAVIORAL HEALTH);INDEPENDENT
HYGIENE/GROOMING: INDEPENDENT
ORAL_HYGIENE: INDEPENDENT
ORAL_HYGIENE: INDEPENDENT
HYGIENE/GROOMING: SHOWER;INDEPENDENT
LAUNDRY: WITH SUPERVISION

## 2021-02-28 NOTE — PROGRESS NOTES
"Pt alerted woke up at 0430 c/o allergic reaction to her upper lip. Writer and another nurse did observed R upper lip swollen. Pt also complained having difficulty in breathing which had resolved at this time but said she did not feel \"Okay\" to her right lip. Pt reported hx of \"hives\" in the past and taking benadryl. Pt had not eaten anything or touching anything that could have suggested the source of her reaction. Provider was updated and Benadryl was ordered and administered. Internal med consult to follow up with the patients concerns was created per providers order. Pt was assessed later and reported feeling some swelling to the same right side of the lip. No breathing or worsening symptoms at this time. VSS. Will continue to monitor and update Provider PRN.   "

## 2021-02-28 NOTE — PLAN OF CARE
"\"I think I'm better. I got a good night sleep last night.\" Denies depression, anxiety is minimal. Denies racing thoughts. Is hyper verbal and tangential. Is social with peers. Swelling of right upper lip is minimal this morning. \"They thought I was having a manic episode but I felt just fine.\" Patient is less hyper verbal and less pressured speech.  "

## 2021-02-28 NOTE — PROGRESS NOTES
"Radha appeared to sleep a total of 6 hours this night shift.  Intermittently awake at times throughout the night.  No snacks given or requested.  Remains on SIO staffing.      Please see below note regarding \"swollen lip\" concerns and Benadryl 50 mg prn given.  She appears to be sleeping comfortably now.  Internal med consult secondary to possible allergic reaction. Will continue to assess patient.     "

## 2021-02-28 NOTE — CONSULTS
[facility]  Consult Note - Hospitalist Service     Date of Admission:  2/22/2021  Consult Requested by: Jonathon Carter MD  Reason for Consult: Swelling of upper lip     Assessment & Plan   Radha James is a 55 year old female with PMHx of depression admitted on 2/22/2021 to inpatient psychiatry for lauren.     #Lip swelling with underlying oral ulcer   DDx include lip trauma from bite, HSV, and less likely allergic reaction or angioedema. Patient has superficial ulcer under swelling and is TTP, suggestive of trauma like accidental bite. Not on any ACE/ARB. Only new medications are zyprexa which was started 4 days ago.   -Continue benadryl PRN  -Peridex swish and spit   -Please notify medicine immediately if patient swelling worsens, or if patient develops any swelling of tongue, throat or develops SOB. Photos taken today for future comparison      #Vit D deficiency   Vitamin D level just below normal (20-75) low at 19.   -Cholecalciferol 400 Units daily     #Hypercholesterolemia   Total cholesterol 212, , HDL 66. No hx of CAD or CVA.   -Lifestyle modifications with diet and exercise and follow up with PCP     #Depression   #Lauren  Started on zyprexa for lauren.   -Management per psychiatry       Medicine will sign off. No further recommendations at this time. Please page the on-call WILLY for any intercurrent medical issues which arise.     Nasima Burden PA-C  [facility]  Contact information available via [facility] Paging/Directory    ______________________________________________________________________    Chief Complaint   Upper lip swelling    History is obtained from the patient    History of Present Illness   Radha James is a 55 year old female with PMHx of depression admitted on 2/22/2021 to inpatient psychiatry for lauren.     Patient is a poor historian.  States she woke up yesterday with swelling in her  "upper lip which may or may not be better today.  Denies any injury but states she could have bitten her lip in her sleep.  Patient states her only new medications are psych medications including Zyprexa.  Patient did receive a dose of nicotine lozenge yesterday.  Denies any known new foods or new environmental factors.  Patient is not on ACE/ARB.  states she has had a history of mouth ulcers on the side of her cheek will feel similar to this.  Patient states she her lip swelling is sore and tender.  States this morning when she woke up she did feel short of breath and endorsed throat swelling which quickly resolved on its own.  Denies any current throat swelling, tongue swelling, shortness of breath or nausea vomiting.  Endorses hives when she is under stress and states she has a rash on her hands which is itchy\" feels like sandpaper\".  Denies any history of known herpes but has occasional cold sores on her lips.  Patient denies any tobacco use but states she vapes marijuana.  Patient denies any prior similar episode.    Of note patient states she is having right leg pain over area where she had repair of a fracture when she was 13 years old.  Also endorsing symptoms of sciatica where pain radiates from her right hip down to her right calf.  States it improves with Tylenol.  Denies any calf redness or swelling.  Denies any history of blood clots.    When discussed with nurse patient woke up this morning with swelling of the right side of her upper lip.  Patient was given Benadryl with reported improvement to the nurse earlier today.    Review of Systems   The 5 point Review of Systems is negative other than noted in the HPI or here.     Past Medical History    I have reviewed this patient's medical history and updated it with pertinent information if needed.   Past Medical History:   Diagnosis Date     Depression        Past Surgical History   I have reviewed this patient's surgical history and updated it with " pertinent information if needed.  Past Surgical History:   Procedure Laterality Date     [place] teeth extraction         Social History   I have reviewed this patient's social history and updated it with pertinent information if needed.  Social History     Tobacco Use     Smoking status: Current Every Day Smoker     Types: Vaping Device     Smokeless tobacco: Never Used   Substance Use Topics     Alcohol use: Yes     Alcohol/week: 1.0 standard drinks     Types: 1 Shots of liquor per week     Comment: socially once a month     Drug use: Yes     Comment: Vaping - THC       Family History   I have reviewed this patient's family history and updated it with pertinent information if needed.  Family History   Problem Relation Age of Onset     Colon Cancer Mother      Breast Cancer Paternal Grandmother        Medications   I have reviewed this patient's current medications  Current Facility-Administered Medications   Medication     acetaminophen (TYLENOL) tablet 650 mg     alum & mag hydroxide-simethicone (MAALOX) suspension 30 mL     diphenhydrAMINE (BENADRYL) capsule 50 mg     famotidine (PEPCID) tablet 20 mg     haloperidol lactate (HALDOL) injection 5 mg    Or     haloperidol (HALDOL) tablet 5 mg     hydrOXYzine (ATARAX) tablet 25 mg     LORazepam (ATIVAN) tablet 1 mg     melatonin tablet 3 mg     nicotine (COMMIT) lozenge 4 mg     OLANZapine (zyPREXA) tablet 30 mg     OLANZapine (zyPREXA) tablet 5 mg     polyethylene glycol (MIRALAX) Packet 17 g     senna-docusate (SENOKOT-S/PERICOLACE) 8.6-50 MG per tablet 1 tablet       Allergies   No Known Allergies    Physical Exam   Vital Signs: Temp: 97  F (36.1  C) Temp src: Tympanic BP: 138/87 Pulse: 92   Resp: 14 SpO2: 99 % O2 Device: None (Room air)    Weight: 218 lbs 14.4 oz  GENERAL: Alert and awake. NAD. Pleasant and conversational   HEENT: AT/NC. Anicteric sclera. EOMI. Mucous membranes moist. Edema of R side of upper lip, with superficial ulceration on mucous membrane  underlying swelling, which is tender to palpation. No other oral lesions visualized. No tongue edema or pharyngeal edema.   NECK: No submandibular or cervical lymphadenopathy.   CARDIOVASCULAR: RRR. S1, S2. No murmurs, rubs, or gallops.   RESPIRATORY: Effort normal on RA. Clear to auscultation bilaterally, no rales, rhonchi or wheezes, no use of accessory muscles, no retractions, respirations unlabored   GI: Abdomen soft, non-tender abdomen.  EXTREMITIES: No peripheral edema. No calf asymmetry, erythema, or tenderness.   NEUROLOGICAL: No focal deficits. CN II-XII grossly intact. Moving all extremities symmetrically. Steady gait.  SKIN: Intact. Warm and dry. No jaundice. Mild faint erythema overlying dorsum of hand and digits, without discrete border and with dry skin. No warmth or edema.     Data   No results found for this or any previous visit (from the past 24 hour(s)).

## 2021-02-28 NOTE — PROGRESS NOTES
Traci bennett pt complained of upper lip swollen, concern for prob swallowing  VS were normal, no wheeze or stridor, some mild lip swelling reported on exam, no new meds today, not sure about foods, not on an ACEI or ARB  :: monitor closely this AM  :: benedryl prn for now  :: follow up with an IM consult later this AM

## 2021-02-28 NOTE — PLAN OF CARE
Pt states she feels better and is enjoying being here and enjoys the other patients on the unit. Pt is calmer , pleasant , smiling , med compliant . Denies si

## 2021-03-01 PROCEDURE — 99232 SBSQ HOSP IP/OBS MODERATE 35: CPT | Mod: GC | Performed by: PSYCHIATRY & NEUROLOGY

## 2021-03-01 PROCEDURE — G0177 OPPS/PHP; TRAIN & EDUC SERV: HCPCS

## 2021-03-01 PROCEDURE — 124N000002 HC R&B MH UMMC

## 2021-03-01 PROCEDURE — 250N000013 HC RX MED GY IP 250 OP 250 PS 637: Performed by: STUDENT IN AN ORGANIZED HEALTH CARE EDUCATION/TRAINING PROGRAM

## 2021-03-01 PROCEDURE — 250N000013 HC RX MED GY IP 250 OP 250 PS 637: Performed by: PHYSICIAN ASSISTANT

## 2021-03-01 PROCEDURE — H2032 ACTIVITY THERAPY, PER 15 MIN: HCPCS

## 2021-03-01 RX ORDER — IBUPROFEN 200 MG
400 TABLET ORAL EVERY 6 HOURS PRN
Status: DISCONTINUED | OUTPATIENT
Start: 2021-03-01 | End: 2021-03-04 | Stop reason: HOSPADM

## 2021-03-01 RX ADMIN — ACETAMINOPHEN 650 MG: 325 TABLET, FILM COATED ORAL at 09:19

## 2021-03-01 RX ADMIN — HYDROXYZINE HYDROCHLORIDE 25 MG: 25 TABLET, FILM COATED ORAL at 08:14

## 2021-03-01 RX ADMIN — NICOTINE POLACRILEX 4 MG: 2 LOZENGE ORAL at 18:47

## 2021-03-01 RX ADMIN — NICOTINE POLACRILEX 4 MG: 2 LOZENGE ORAL at 20:33

## 2021-03-01 RX ADMIN — CHLORHEXIDINE GLUCONATE 0.12% ORAL RINSE 15 ML: 1.2 LIQUID ORAL at 08:08

## 2021-03-01 RX ADMIN — NICOTINE POLACRILEX 4 MG: 2 LOZENGE ORAL at 10:27

## 2021-03-01 RX ADMIN — CHLORHEXIDINE GLUCONATE 0.12% ORAL RINSE 15 ML: 1.2 LIQUID ORAL at 20:20

## 2021-03-01 RX ADMIN — OLANZAPINE 5 MG: 5 TABLET, FILM COATED ORAL at 08:08

## 2021-03-01 RX ADMIN — NICOTINE POLACRILEX 4 MG: 2 LOZENGE ORAL at 14:18

## 2021-03-01 RX ADMIN — OLANZAPINE 30 MG: 15 TABLET, FILM COATED ORAL at 20:20

## 2021-03-01 RX ADMIN — CHOLECALCIFEROL TAB 10 MCG (400 UNIT) 10 MCG: 10 TAB at 08:08

## 2021-03-01 RX ADMIN — OLANZAPINE 5 MG: 5 TABLET, FILM COATED ORAL at 13:19

## 2021-03-01 ASSESSMENT — ACTIVITIES OF DAILY LIVING (ADL)
DRESS: SCRUBS (BEHAVIORAL HEALTH);INDEPENDENT
HYGIENE/GROOMING: INDEPENDENT
ORAL_HYGIENE: INDEPENDENT

## 2021-03-01 NOTE — PLAN OF CARE
"  Problem: Behavioral Health Plan of Care  Goal: Plan of Care Review  Outcome: No Change    Pt out in the milieu interactive with peers and staff. Observed to be hyperverbal, presents full range affect. Describe her day as \"it has been awesome today\" Denies SI/SIB/HI. Denies experiencing any type of hallucinations. Claimed having \"mild\" anxiety but could not rate it. Denies having depression as of right now. Claimed part of her coping skills are listening to music and writing in her journal. Claimed her main goal is to discharge and go back to her family.   No complaints of difficulty of breathing. Claimed she is breathing ok. Minimal redness and swelling noted on right upper lip. Pt denies itching and pain.   Consumed 100% of share of dinner and took in approximately 720cc of fluids.  Due medications given. Denies experiencing side effects.   Pt continues to be on SIO, 10ft. Needs attended to. Staff will continue to monitor. No further concerns noted as of this time.   "

## 2021-03-01 NOTE — PLAN OF CARE
Work Completed:  Chart review  Team meeting  Ongoing discussions with patient regarding use of medications for stabilization. Patient coming to terms with the need for this.     Discharge plan or goal: Likely to home with appropriate outpatient follow up when stable.                Barriers to discharge: Continued need for mental health stabilization including medication adjustments.

## 2021-03-01 NOTE — PLAN OF CARE
"\"I slept well.\" Patient states can tell thoughts have slowed down. Patient is less hyper verbal and less pressured. Denies depression and anxiety. Spoke with patient about not touching peers and keeping boundaries. Patient agreed. SIO to be discontinued. Attends groups and is social with peers.   "

## 2021-03-01 NOTE — PLAN OF CARE
Problem: Adult Inpatient Plan of Care  Goal: Optimal Comfort and Wellbeing  Outcome: Improving  Received Pt sitting in the lounge when shift commenced. Social with peers and keeping distance. Thoughts are more organized from a a few days ago. Pt is less hyperverbal and less intrusive with others. General conversation makes sense. States she is feeling better, medication is helping and feels it is the right combo. Radha states she has an occasional idea or two but not like before. Racing thoughts are improved. She does not have quick or deep thoughts any more and does not feel the need to solve or go through them. She is focusing better. Relieved that she is able to get Nicotine Lozenges because she was not aware they were available. She also thinks sound machine is helping with sleep.  Requested for prn Nicotine Lozenges. Denies SI/SIB/AVH.

## 2021-03-01 NOTE — CARE CONFERENCE
Pt's  called and would like an update on the timeline for pt to be discharged. Caller stated he would like a conference with doctors tomorrow to get a better idea of a discharger date for pt. Writer told caller that he should call after 1pm tomorrow to talk with the doctors regarding the timeline if the doctors do not call before then. Caller agreed.

## 2021-03-01 NOTE — PROGRESS NOTES
Radha appeared to sleep a total of 6.5 hours this night shift.  Appeared comfortable and up in the lounge now drinking coffee.  Remains on SIO staffing.
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
## 2021-03-01 NOTE — PLAN OF CARE
"Music Therapy Group note    Total time in session: 45 minutes     Number of patients in group: 5    Scope of service: humanistic    Patient progress: initial encounter    Patient response/reaction to treatment intervention(s): Radha was the most engaged participant in group, though the group was well engaged.  She was very talkative and expressive/enthusiastic. She has strong tastes/preferences for her music of choice and for lesser known bands.  She was pleasant and redirectable.  She did make one comment that could have been interpreted as SI, saying \"Oh, I should be around then (in March)\", but quickly corrected and explained herself.     Sanna Gaviria, [facility]  Board-Certified Music Therapist           "

## 2021-03-02 ENCOUNTER — APPOINTMENT (OUTPATIENT)
Dept: MRI IMAGING | Facility: CLINIC | Age: 56
End: 2021-03-02
Payer: COMMERCIAL

## 2021-03-02 PROCEDURE — 250N000013 HC RX MED GY IP 250 OP 250 PS 637: Performed by: STUDENT IN AN ORGANIZED HEALTH CARE EDUCATION/TRAINING PROGRAM

## 2021-03-02 PROCEDURE — G0177 OPPS/PHP; TRAIN & EDUC SERV: HCPCS

## 2021-03-02 PROCEDURE — 70551 MRI BRAIN STEM W/O DYE: CPT | Mod: 26 | Performed by: STUDENT IN AN ORGANIZED HEALTH CARE EDUCATION/TRAINING PROGRAM

## 2021-03-02 PROCEDURE — 124N000002 HC R&B MH UMMC

## 2021-03-02 PROCEDURE — 70551 MRI BRAIN STEM W/O DYE: CPT

## 2021-03-02 PROCEDURE — 99232 SBSQ HOSP IP/OBS MODERATE 35: CPT | Mod: GC | Performed by: PSYCHIATRY & NEUROLOGY

## 2021-03-02 PROCEDURE — 250N000013 HC RX MED GY IP 250 OP 250 PS 637: Performed by: PHYSICIAN ASSISTANT

## 2021-03-02 RX ADMIN — NICOTINE POLACRILEX 4 MG: 2 LOZENGE ORAL at 16:11

## 2021-03-02 RX ADMIN — CHOLECALCIFEROL TAB 10 MCG (400 UNIT) 10 MCG: 10 TAB at 09:36

## 2021-03-02 RX ADMIN — OLANZAPINE 5 MG: 5 TABLET, FILM COATED ORAL at 09:36

## 2021-03-02 RX ADMIN — OLANZAPINE 5 MG: 5 TABLET, FILM COATED ORAL at 16:10

## 2021-03-02 RX ADMIN — OLANZAPINE 30 MG: 15 TABLET, FILM COATED ORAL at 20:59

## 2021-03-02 RX ADMIN — CHLORHEXIDINE GLUCONATE 0.12% ORAL RINSE 15 ML: 1.2 LIQUID ORAL at 09:36

## 2021-03-02 RX ADMIN — NICOTINE POLACRILEX 4 MG: 2 LOZENGE ORAL at 21:10

## 2021-03-02 RX ADMIN — CHLORHEXIDINE GLUCONATE 0.12% ORAL RINSE 15 ML: 1.2 LIQUID ORAL at 19:19

## 2021-03-02 RX ADMIN — NICOTINE POLACRILEX 4 MG: 2 LOZENGE ORAL at 09:37

## 2021-03-02 ASSESSMENT — ACTIVITIES OF DAILY LIVING (ADL)
HYGIENE/GROOMING: INDEPENDENT
ORAL_HYGIENE: INDEPENDENT
LAUNDRY: WITH SUPERVISION
DRESS: INDEPENDENT

## 2021-03-02 NOTE — PLAN OF CARE
Problem: General Rehab Plan of Care  Goal: Therapeutic Recreation/Music Therapy Goal (Art Therapy)  Description: The patient and/or their representative will achieve their patient-specific goals related to the plan of care.  The patient-specific goals include: emotional expression  Outcome: No Change     Art Therapy directive is to create an image of calm mind vs anxious mind using a head profile template/handout and using lines, shapes and colors. Goals of directive: emotional expression, emotional regulation, mindfulness, trauma containment, media exploration. Pt was hyperverbal during group with extremely bright affect at times. Pt said she enjoys art and talked about how she feels she is combines her interests in math and art and excels in both. Pt chose to use lines, shapes and colors to express both calm and anxious mind. Pt shared that she enjoyed working with chosen art materials. Author needs more time to assess.

## 2021-03-02 NOTE — PLAN OF CARE
Problem: Behavior Regulation Impairment (Psychotic Signs/Symptoms)  Goal: Improved Behavioral Control (Psychotic Signs/Symptoms)  Outcome: Improving  Note: Pt attended groups and ate her meals in the dining area. Pt states that she is doing very well and is hoping for discharge later in the week. Thoughts are clear and increasingly organized. Mood is calm and affect is full range. She reports that her meds are helping her. She is social with staff and peers. Somewhat distracted but is able to stay in conversation or on task. She is med compliant. Denies SI and SIB. Kathryn Stovall RN

## 2021-03-02 NOTE — PLAN OF CARE
Problem: Adult Inpatient Plan of Care  Goal: Readiness for Transition of Care  Outcome: Improving    Pt was in the lounge conversing with others. Pleasant and calm. Requested for prn Nicotine Lozenge a couple of times this shift. Pt was taken  down for an MRI that was ordered. No behavioral issues while there. Engaged in activities with peers. Speech is less pressured, mood is calm, affect is full range. Denies SI/SIB/HI, contracts for safety. Ate dinner, med compliant and denies medication side effects.

## 2021-03-02 NOTE — PROGRESS NOTES
"Patient attended a mental health education group for a period of 1 hour on the topic of values and goals. The task was to complete a \"Life Tree\".  Patient was able to complete the project and shared insights with the group in an appropriate manner.  She was able to work through what felt like less than ideal directions regarding what was expected and joked or made comments about her \"OCD\" and need for things to look perfect.    "

## 2021-03-02 NOTE — PLAN OF CARE
Work Completed:  Chart review  Team meeting  Met with patient, who reports she feels she is doing better.  The subject of day treatment was discussed but patient does not feel that would be the best option for her.  She is in favor of locating a psychiatrist and possibly a therapist.  She indicates her spouse is wondering when she can come home and team plans to contact spouse today.     Discharge plan or goal: To home with referrals for new providers as appropriate.                Barriers to discharge: Medication management, ongoing evaluation including MRI.

## 2021-03-02 NOTE — PLAN OF CARE
BEHAVIORAL TEAM DISCUSSION    Participants: Jonathon Carter MD; Gregor Michelle MD; medical students Filiberto Pedersen and Beverly Sales; Estrellita, pharmacy; Marla LAL.   Progress: improving  Anticipated length of stay: 2-3 more days  Continued Stay Criteria/Rationale: Patient is improving but has not reached baseline and is not safe for discharge yet. Plan for further imaging to rule out medical causes for onset.   Medical/Physical: chronic pain, leg.  No acute medical issues  Precautions:   Behavioral Orders   Procedures     Code 1 - Restrict to Unit     Fall precautions     Routine Programming     As clinically indicated     Status 15     Every 15 minutes.     Plan: Brain MRI today likely.  Patient is not interested in day treatment but is in agreement with obtaining a psychiatrist and possibly a therapist.  [facility] to assist with these referrals.  Plan to obtain collateral from spouse today.  Plan for discharge end of week.  Rationale for change in precautions or plan: Patient is improving.

## 2021-03-02 NOTE — PROGRESS NOTES
Pt was mostly in the milieu engaging with peers and staff. Pt attended groups and ate her meals in the dining area. Pt states that she is doing very well and that she has no thoughts to harm herself or others. Pt says that her medications are working well for her and that she is hoping to discharge this week.

## 2021-03-02 NOTE — PROGRESS NOTES
"    ----------------------------------------------------------------------------------------------------------  [facility], [place]   Psychiatric Progress Note  Hospital Day #8     Interim History:   The patient's care was discussed with the treatment team and chart notes were reviewed.    Sleep: 6.5 hours  Scheduled Medications: TAP   PRN Medications: Nicotine x4    Staff Report: \"Pt spent their time watching TV, resting in their room, and socializing with others.  Pt is very friendly and talkative.  Pt has positive demeanor.  Pt attended OT.\"    Patient Interview: Radha was interviewed in her personal room. Radha says she feels \"great\" because \"I've been sleeping so well.\" She reports she slept the whole night, besides waking up to use the bathroom one time. She states she has \"less thoughts running through my head right now\" and thus \"it's so much easier to sleep now.\" Radha also reports that the white noise machine helps her stay asleep.    She says she enjoys watching movies and talking with other patients, but \"misses my cats and my house.\" She expresses understanding of and agreed with the importance of outpatient psychiatry. However, she was hesitant to commit to group therapy sessions like day treatment programming, but did say \"I'm not taking it off the table either.\" When asked about individual therapy, she seemed more responsive and interested.    She continues to have dully, achy leg pain that radiates up her leg from time to time. She has been using PRN Tylenol to treat the pain, but was informed again that PRN Ibuprofen is now available, too. Patient stated that Ibuprofen alleviates the pain better than Tylenol.    She is hoping to return home soon. Patient's  Jose ([phone]) was called today and expressed understanding of progress and tentative plan. He spoke to Radha earlier today, expressing that she seems to be \"near or at her baseline.\" He said that she " "told him that she \"really does not want to stay in the hospital until next week.\" Patient's  also said that Radha was hesitant about \"doing therapy with other people she doesn't trust.\"    The risks, benefits, alternatives and side effects of any medication changes have been discussed and are understood by the patient and other caregivers.    Review of systems:     ROS was negative unless noted above.          Allergies:   No Known Allergies         Psychiatric Examination:   BP (!) 143/83 (BP Location: Left arm)   Pulse 67   Temp 97.4  F (36.3  C) (Oral)   Resp 16   Wt 101.6 kg (224 lb)   SpO2 97%   Weight is 224 lbs 0 oz  There is no height or weight on file to calculate BMI.    Appearance:  awake, alert, dressed in hospital scrubs and appeared as age stated  Attitude:  cooperative and calm  Eye Contact:  good  Mood:  \"I feel great\"  Affect: Euthymic; appropriate and in normal range, mood congruent, intensity is exaggerated and full range  Speech:  clear, coherent, rambling and somewhat pressured  Psychomotor Behavior:  no evidence of tardive dyskinesia, dystonia, or tics  Thought Process:  logical, goal oriented and circumstantial  Associations:  no loose associations  Thought Content:  no evidence of suicidal ideation or homicidal ideation and no evidence of psychotic thought  Insight:  fair  Judgment:  fair  Oriented to:  time, person, and place  Attention Span and Concentration:  fair  Recent and Remote Memory:  limited  Language: fluent English with appropriate syntax  Fund of Knowledge: appropriate  Muscle Strength and Tone: appears normal   Gait and Station: normal gait, intact station         Labs:   No results found for this or any previous visit (from the past 24 hour(s)).     Assessment    Principal Diagnosis:   # Phuong with psychosis    Secondary psychiatric diagnoses of concern this admission:   # Cannabis use disorder  # Tobacco use disorder    Diagnostic Impression:   Radha James " is a 55 year old  White female previously diagnosed with MDD who presents with elevated mood, bizarre behavior, and decreased sleep requirements in the context of job stress. Substances are not likely a contributing factor to their presentation, though she has ongoing nicotine and cannabis use. She has not changed use behavior for some years.  Biological contributions to mental health presentation include family history of bipolar disorder and ongoing substance use. Psychosocial factors contributing to current presentation include work stress and pandemic circumstances.    The patient's presentation is consistent with new onset lauren, though other diagnoses are under consideration. It is unclear if this episode is due to primary mood disorder or secondary to another cause. Both patient remarks and collateral from  support that this is her first episode. If due to bipolar disorder, it is suprising she has not had manic episodes prior to the age of 55. Other possible etiologies include antibiotic-induced lauren (unlikely due to antibiotic initation after florid lauren), lauren episode due to another medical condition (menopause unlikely as patient >1 year from last menstrual period, tickborne disease (recent insect bite in warmer climate), and delirium (possibly in the setting of tooth or urinary infection, but patient denies any symptoms at this time and is afebrile though had subjective fevers recently and urine sample in ED was + for casts and bacteria though contaminated with squamous cells). Given this, plan to hold amoxicillin (only recent medication change that could correlate with symptoms), repeat urinalysis, and continue to use sleep aids. Will also add titers for common Floridain tickborne diseases given recent travel and possible bite.     Hospital course: Radha James was admitted to station 22 as a voluntary patient. She was not on psychotropics PTA, and initiated quetiapine for sleep,  which was titrated to 75mg at night.  Quetiapine was discontinued after Radha experienced a fall and orthostatic hypotension, and zyprexa was scheduled at bedtime. Zyprexa titrated to 5mg BID and 30mg at bedtime.    Discontinued Medications (& Rationale):  Quetiapine - orthostatic hypotension    Medical course: Patient was medically cleared prior to admission. Amoxicillin was held due to being unnecessary. Patient experienced a mechanical fall after slipping due to socks. She bumped head, but on assessment had no change in mental status or appreciable bruise/bump, and her pupils were reactive to light, and CT Head was negative.    Plan   Today's changes:  -  MRI, non-contrast as part of first episode workup    Psychotropic Medications:  Scheduled Meds:  OLANZapine 30 mg at bedtime  OLANZapine 5 mg twice during the day       PRN Meds:  HydrOXYzine 25mg q4H, for anxiety  Melatonin 3mg at bedtime, for sleep  Nicorette 4mg, for tobacco cessation  Haloperidol IM/PO 5mg, for severe agitation  Senna-docusate for constipation  Ativan 1mg at bedtime  for sleep      Patient will be treated in therapeutic milieu with appropriate individual and group therapies as described.      Medical diagnoses to be addressed this admission:    # Concern for tick-borne diseases   - Lyme IgG negative  - Anaplasma negative      Data: See above  Consults: None    Legal Status: Voluntary    Safety Assessment:   Behavioral Orders   Procedures     Code 1 - Restrict to Unit     Code 2     For MRI only     Fall precautions     Routine Programming     As clinically indicated     Status 15     Every 15 minutes.       Disposition: 3-5 days, Pending stabilization, medication management & development of a safe discharge plan.    The patient was seen and discussed with the attending physician, Dr. Jonathon Carter.    Indio Pedersen, MS3    I was present with the medical student who participated in the service and in the documentation of the note. I have  verified the history and medical decision making. I agree with the assessment and plan of care as documented in the note.     Gregor Michelle MD, PhD  PGY-1 Psychiatry Resident        Attestation:  This patient has been seen and evaluated by me, Jonathon Carter MD.  I have discussed this patient with the house staff team including the resident and medical student and I agree with the findings and plan in this note.    I have reviewed today's vital signs, medications, labs and imaging. Jonathon Carter MD , PhD.

## 2021-03-02 NOTE — PROGRESS NOTES
Pt spent their time watching TV, resting in their room, and socializing with others.  Pt is very friendly and talkative.  Pt has positive demeanor.  Pt attended OT.  Pt ate dinner and snacks.

## 2021-03-02 NOTE — PROGRESS NOTES
Radha continues to present with pressured, tangential speech & enthusiasm about all activities and discussion topics. Pleasant and bright. Sociable with peers. Very engaged in all therapeutic endeavors.      03/02/21 1400   Occupational Therapy   Type of Intervention structured groups   Response Initiates, socially acceptable   Hours 3

## 2021-03-02 NOTE — PROGRESS NOTES
Pt was sleeping when shift began and only woke up to use the bathroom. No prns given or requested and Pt  was observed to have slept for 6.5 hours this night.

## 2021-03-03 PROCEDURE — 124N000002 HC R&B MH UMMC

## 2021-03-03 PROCEDURE — 250N000013 HC RX MED GY IP 250 OP 250 PS 637: Performed by: PHYSICIAN ASSISTANT

## 2021-03-03 PROCEDURE — 250N000013 HC RX MED GY IP 250 OP 250 PS 637: Performed by: STUDENT IN AN ORGANIZED HEALTH CARE EDUCATION/TRAINING PROGRAM

## 2021-03-03 PROCEDURE — G0177 OPPS/PHP; TRAIN & EDUC SERV: HCPCS

## 2021-03-03 PROCEDURE — 99231 SBSQ HOSP IP/OBS SF/LOW 25: CPT | Mod: GC | Performed by: PSYCHIATRY & NEUROLOGY

## 2021-03-03 RX ORDER — OLANZAPINE 5 MG/1
5 TABLET ORAL DAILY
Status: DISCONTINUED | OUTPATIENT
Start: 2021-03-04 | End: 2021-03-04 | Stop reason: HOSPADM

## 2021-03-03 RX ADMIN — NICOTINE POLACRILEX 4 MG: 2 LOZENGE ORAL at 10:19

## 2021-03-03 RX ADMIN — CHLORHEXIDINE GLUCONATE 0.12% ORAL RINSE 15 ML: 1.2 LIQUID ORAL at 20:39

## 2021-03-03 RX ADMIN — NICOTINE POLACRILEX 4 MG: 2 LOZENGE ORAL at 08:46

## 2021-03-03 RX ADMIN — NICOTINE POLACRILEX 4 MG: 2 LOZENGE ORAL at 16:17

## 2021-03-03 RX ADMIN — CHOLECALCIFEROL TAB 10 MCG (400 UNIT) 10 MCG: 10 TAB at 08:46

## 2021-03-03 RX ADMIN — NICOTINE POLACRILEX 4 MG: 2 LOZENGE ORAL at 17:34

## 2021-03-03 RX ADMIN — OLANZAPINE 30 MG: 15 TABLET, FILM COATED ORAL at 20:38

## 2021-03-03 RX ADMIN — NICOTINE POLACRILEX 4 MG: 2 LOZENGE ORAL at 18:51

## 2021-03-03 RX ADMIN — OLANZAPINE 5 MG: 5 TABLET, FILM COATED ORAL at 08:46

## 2021-03-03 RX ADMIN — CHLORHEXIDINE GLUCONATE 0.12% ORAL RINSE 15 ML: 1.2 LIQUID ORAL at 08:47

## 2021-03-03 ASSESSMENT — ACTIVITIES OF DAILY LIVING (ADL)
HYGIENE/GROOMING: INDEPENDENT
ORAL_HYGIENE: INDEPENDENT
DRESS: INDEPENDENT
LAUNDRY: WITH SUPERVISION

## 2021-03-03 NOTE — PLAN OF CARE
Thoughts are clear and organized. Mood is stable. States she is looking forward to discharge tomorrow. She is social with staff and peers. Attends and participates in groups. She is med compliant. Denies SI and SIB. Kathryn Stovall RN

## 2021-03-03 NOTE — PLAN OF CARE
Problem: OT General Care Plan  Goal: OT Goal 1    Pt attended 3 out of 3 OT groups offered. Pt actively participated in a structured occupational therapy group of 3 patients total x45 minutes with a focus on facilitating self-awareness, self-esteem, and social engagement. Pt appeared comfortable sharing positive personal information, and was respectful in listening and responding to peers. Pt identified her  as the most supportive person in her life. Pt actively participated in occupational therapy clinic with 2 patients total x90 minutes. Pt was able to ask for assistance as needed, and independently returned to a creative expression task. Pt demonstrated good focus, planning, problem solving, and attention to detail. Organized and efficient in her task approach. Social with peers and staff throughout, and discussed how she enjoys connecting with people, particularly finding music preferences in common to form a connection. Somewhat hyperverbal in conversation, though less so in comparison to groups last week. Pleasant, cooperative, and engaged throughout all groups today. Bright affect.

## 2021-03-03 NOTE — PROGRESS NOTES
"    ----------------------------------------------------------------------------------------------------------  [facility], [place]   Psychiatric Progress Note  Hospital Day #9     Interim History:   The patient's care was discussed with the treatment team and chart notes were reviewed.    Sleep: 5.5 hours  Scheduled Medications: taken as prescribed  PRN Medications: Nicotine x3    Staff Report: visible in milieu, pleasant and calm. MRI completed. Social. No acute behavioral concerns.    Patient Interview: Radha was interviewed in her personal room. Radha says she feels \"like I did yesterday.\" She went on to say, \"I wouldn't say I'm better or worse than yesterday, just that I'm doing the same... I guess that's a good thing because I think I was doing well yesterday.\" She reports she slept for \"about 6 hours\" last night, and only got out of bed to use the bathroom.    She talks about her excitement regarding being discharged, including \"sleeping in my own bed\" and \"seeing my ... we've been together for 26 years.\"    She expresses understanding of and agreed with the diagnosis of a manic episode, although she did say it was a \"very calm manic episode\" and \"I wasn't running down the street in just my shorts,\" thereby referring to her brother's manic episode years ago. She was pleased to hear that the MRI did not show any abnormalities, and stated that there were other probable causes of her lauren. She spoke about insomnia being a factor, and how she was worried about family members dealing with the ice storms in [place] and anxious about the anniversary of her mother's death.    Patient's  Jose ([phone]) was called today and expressed understanding of the tentative plan. He spoke to Radha earlier today, and both of them feel comfortable about her returning home soon. He says he has been trying to schedule a follow-up psychiatry appointment with a specific provider, but the " "earliest availability is not until May. Thus, he would like help scheduling an appointment sooner.    The risks, benefits, alternatives and side effects of any medication changes have been discussed and are understood by the patient and other caregivers.    Review of systems:     ROS was negative unless noted above.          Allergies:   No Known Allergies         Psychiatric Examination:   /80 (BP Location: Right arm)   Pulse 84   Temp 96  F (35.6  C) (Tympanic)   Resp 14   Wt 101.6 kg (224 lb)   SpO2 99%   Weight is 224 lbs 0 oz  There is no height or weight on file to calculate BMI.    Appearance:  awake, alert, dressed in hospital scrubs and appeared as age stated  Attitude:  cooperative and calm  Eye Contact:  good and much improved   Mood:  \"I feel like I did yesterday\"  Affect: Euthymic; appropriate and in normal range, mood congruent, intensity is normal and full range  Speech:  clear, coherent, rambling and somewhat pressured  Psychomotor Behavior:  no evidence of tardive dyskinesia, dystonia, or tics  Thought Process:  logical, goal oriented and circumstantial  Associations:  no loose associations  Thought Content:  no evidence of suicidal ideation or homicidal ideation and no evidence of psychotic thought  Insight:  good  Judgment:  fair  Oriented to:  Not formally tested  Attention Span and Concentration:  fair  Recent and Remote Memory:  fair  Language: fluent English with appropriate syntax  Fund of Knowledge: appropriate  Muscle Strength and Tone: appears normal   Gait and Station: normal gait, intact station         Labs:     Results for orders placed or performed during the hospital encounter of 02/22/21 (from the past 24 hour(s))   MRI Brain w/o contrast    Narrative    MR BRAIN W/O CONTRAST 3/2/2021 4:58 PM    Provided History: Psychosis; Mental status change, unknown cause    Comparison:  Head CT 2/22/2021     Technique: Sagittal T1-weighted and axial T2-weighted, turboFLAIR " and  diffusion-weighted with ADC map images of the brain were obtained  without intravenous contrast.    Findings: These images reveal no intracranial mass lesion, mass  effect, midline shift or abnormal extraaxial fluid collection. The  ventricles and sulci are normal for age. No abnormality of reduced  diffusion.  Normal intravascular flow voids.      Impression    Impression: Essentially normal brain MRI.    I have personally reviewed the examination and initial interpretation  and I agree with the findings.    DAT ALAS MD        Assessment    Principal Diagnosis:   # Phuong with psychosis    Secondary psychiatric diagnoses of concern this admission:   # Cannabis use disorder  # Tobacco use disorder    Diagnostic Impression:   Radha James is a 55 year old  White female previously diagnosed with MDD who presents with elevated mood, bizarre behavior, and decreased sleep requirements in the context of job stress. Substances are not likely a contributing factor to their presentation, though she has ongoing nicotine and cannabis use. She has not changed use behavior for some years.  Biological contributions to mental health presentation include family history of bipolar disorder and ongoing substance use. Psychosocial factors contributing to current presentation include work stress and pandemic circumstances.    The patient's presentation is consistent with new onset phuong, though other diagnoses are under consideration. It is unclear if this episode is due to primary mood disorder or secondary to another cause. Both patient remarks and collateral from  support that this is her first episode. If due to bipolar disorder, it is suprising she has not had manic episodes prior to the age of 55. Other possible etiologies include antibiotic-induced phuong (unlikely due to antibiotic initation after florid phuong), phuong episode due to another medical condition (menopause unlikely as patient >1 year from  last menstrual period, tickborne disease (recent insect bite in warmer climate), and delirium (possibly in the setting of tooth or urinary infection, but patient denies any symptoms at this time and is afebrile though had subjective fevers recently and urine sample in ED was + for casts and bacteria though contaminated with squamous cells). Given this, plan to hold amoxicillin (only recent medication change that could correlate with symptoms), repeat urinalysis, and continue to use sleep aids. Will also add titers for common Floridain tickborne diseases given recent travel and possible bite.     Hospital course: Radha James was admitted to station 22 as a voluntary patient. She was not on psychotropics PTA, and initiated quetiapine for sleep, which was titrated to 75mg at night.  Quetiapine was discontinued after Radha experienced a fall and orthostatic hypotension, and zyprexa was scheduled at bedtime. Zyprexa titrated to 5mg BID and 30mg at bedtime. If Radha maintains current condition, discharge will most likely occur tomorrow (3/4).    Discontinued Medications (& Rationale):  Quetiapine - orthostatic hypotension    Medical course: Patient was medically cleared prior to admission. Amoxicillin was held due to being unnecessary. Patient experienced a mechanical fall after slipping due to socks. She bumped head, but on assessment had no change in mental status or appreciable bruise/bump, and her pupils were reactive to light, and CT Head was negative.    Plan   Today's changes:  -  Discontinue OLANZapine 5 mg in the afternoon (continue 30 at bedtime and 5 qAM)    Psychotropic Medications:  Scheduled Meds:  OLANZapine 30 mg at bedtime  OLANZapine 5 mg in the morning      PRN Meds:  HydrOXYzine 25mg q4H, for anxiety  Melatonin 3mg at bedtime, for sleep  Nicorette 4mg, for tobacco cessation  Haloperidol IM/PO 5mg, for severe agitation  Senna-docusate for constipation  Ativan 1mg at bedtime  for sleep      Patient  will be treated in therapeutic milieu with appropriate individual and group therapies as described.      Medical diagnoses to be addressed this admission:    # Concern for tick-borne diseases   - Lyme IgG negative  - Anaplasma negative      Data: See above  Consults: None    Legal Status: Voluntary    Safety Assessment:   Behavioral Orders   Procedures     Code 1 - Restrict to Unit     Code 2     For MRI only     Fall precautions     Routine Programming     As clinically indicated     Status 15     Every 15 minutes.       Disposition: Anticipate discharge tomorrow (03/04), pending stabilization, medication management & development of a safe discharge plan.    The patient was seen and discussed with the attending physician, Dr. Jonathon Carter.    Indio Pedersen, MS3    I was present with the medical student who participated in the service and in the documentation of the note. I have verified the history and medical decision making. I agree with the assessment and plan of care as documented in the note.     Gregor Michelle MD, PhD  PGY-1 Psychiatry Resident      Attestation:  This patient has been seen and evaluated by me, Jonathon Carter MD.  I have discussed this patient with the house staff team including the resident and medical student and I agree with the findings and plan in this note.    I have reviewed today's vital signs, medications, labs and imaging. Jonathon Carter MD , PhD.

## 2021-03-03 NOTE — PLAN OF CARE
Problem: Behavior Regulation Impairment (Psychotic Signs/Symptoms)  Goal: Improved Behavioral Control (Psychotic Signs/Symptoms)  Outcome: Improving  Note: Thoughts are clear and organized. Mood is stable. States she is looking forward to discharge tomorrow. She is social with staff and peers. Attends and participates in groups. She is med compliant. Denies SI and SIB. Kathryn Stovall RN

## 2021-03-04 VITALS
TEMPERATURE: 96 F | OXYGEN SATURATION: 99 % | WEIGHT: 224.2 LBS | DIASTOLIC BLOOD PRESSURE: 80 MMHG | HEART RATE: 84 BPM | RESPIRATION RATE: 14 BRPM | SYSTOLIC BLOOD PRESSURE: 131 MMHG

## 2021-03-04 PROCEDURE — 99238 HOSP IP/OBS DSCHRG MGMT 30/<: CPT | Mod: GC | Performed by: PSYCHIATRY & NEUROLOGY

## 2021-03-04 PROCEDURE — 250N000013 HC RX MED GY IP 250 OP 250 PS 637: Performed by: STUDENT IN AN ORGANIZED HEALTH CARE EDUCATION/TRAINING PROGRAM

## 2021-03-04 PROCEDURE — G0177 OPPS/PHP; TRAIN & EDUC SERV: HCPCS

## 2021-03-04 PROCEDURE — 250N000013 HC RX MED GY IP 250 OP 250 PS 637: Performed by: PHYSICIAN ASSISTANT

## 2021-03-04 RX ORDER — OLANZAPINE 5 MG/1
5 TABLET ORAL DAILY
Qty: 30 TABLET | Refills: 0 | Status: SHIPPED | OUTPATIENT
Start: 2021-03-05 | End: 2021-04-04

## 2021-03-04 RX ORDER — OLANZAPINE 15 MG/1
30 TABLET ORAL AT BEDTIME
Qty: 60 TABLET | Refills: 0 | Status: SHIPPED | OUTPATIENT
Start: 2021-03-04 | End: 2021-03-04

## 2021-03-04 RX ORDER — OLANZAPINE 10 MG/1
10 TABLET ORAL AT BEDTIME
Qty: 30 TABLET | Refills: 0 | COMMUNITY
Start: 2021-03-04 | End: 2024-01-03

## 2021-03-04 RX ORDER — OLANZAPINE 20 MG/1
20 TABLET ORAL
Qty: 30 TABLET | Refills: 0 | COMMUNITY
Start: 2021-03-04 | End: 2024-01-03

## 2021-03-04 RX ADMIN — NICOTINE POLACRILEX 4 MG: 2 LOZENGE ORAL at 13:09

## 2021-03-04 RX ADMIN — OLANZAPINE 5 MG: 5 TABLET, FILM COATED ORAL at 08:30

## 2021-03-04 RX ADMIN — CHOLECALCIFEROL TAB 10 MCG (400 UNIT) 10 MCG: 10 TAB at 08:30

## 2021-03-04 RX ADMIN — CHLORHEXIDINE GLUCONATE 0.12% ORAL RINSE 15 ML: 1.2 LIQUID ORAL at 08:33

## 2021-03-04 RX ADMIN — NICOTINE POLACRILEX 4 MG: 2 LOZENGE ORAL at 08:34

## 2021-03-04 ASSESSMENT — ACTIVITIES OF DAILY LIVING (ADL)
ORAL_HYGIENE: INDEPENDENT
DRESS: INDEPENDENT
HYGIENE/GROOMING: INDEPENDENT
LAUNDRY: UNABLE TO COMPLETE

## 2021-03-04 NOTE — PLAN OF CARE
"Problem: OT General Care Plan  Goal: OT Goal 1    Pt attended 2 out of 2 OT groups offered. Pt actively participated in a structured occupational therapy group of 4 patients total x55 minutes with a focus on connecting song titles to life events and personal feelings. Using a list of song titles, pt identified You Can't Always Get What You Want, A Hard Day's Night, [place], Will You Still Need Me When I'm 66 (\"I think people will still need me then!\"), I Am A Rock, and Yesterday (\"I had more troubles yesterday than today\") as song titles that relate to her life. Pt identified  I Can See Clearly Now as a song title that indicates something about her future. Pt also independently identified \"I Wanna Prove To You by [employer] Lemon Twigs\" as a song that she personally relates to. Pt then completed a visual scanning task while listening to identified songs to facilitate focus and organization. Demonstrated good attention to task and efficient visual scanning. Pt actively participated in occupational therapy clinic with 3-4 patients total x55 minutes. Pt was able to ask for assistance as needed, and independently returned to a creative expression task. Pt demonstrated good focus, planning, problem solving, and attention to detail. Organized in her task approach. Social with peers and staff. Pleasant, cooperative, and engaged throughout all groups. Bright affect. Future-oriented upon discussing upcoming discharge, sharing that her  is very supportive, and that she is looking forward to seeing her cats.     "

## 2021-03-04 NOTE — PLAN OF CARE
Pt evaluation and assessment continues this shift. Assessed mood, anxiety, thoughts and behavior. Pt is progressing towards discharge goals. Encourage participation in groups and developing healthy coping skills. Will continue current plan of care. Pt. Denies SI/SIB. Pt scheduled to be discharged today at 1630. Discharge medications available in the med room.     Problem: Suicidal Behavior  Goal: Suicidal Behavior is Absent or Managed  Outcome: No Change     Problem: Cognitive Impairment (Psychotic Signs/Symptoms)  Goal: Optimal Cognitive Function (Psychotic Signs/Symptoms)  Outcome: Improving     Problem: Sleep Disturbance (Psychotic Signs/Symptoms)  Goal: Improved Sleep (Psychotic Signs/Symptoms)  Outcome: Improving

## 2021-03-04 NOTE — PLAN OF CARE
Pt states se feels good and is ready for discharge tomorrow. She feels the Zyprexa has helped her sleep and slowed her thoughts . She works from home and plans to return to work on Monday . Pleasant , social , denies si

## 2021-03-04 NOTE — PROGRESS NOTES
Pt slept 6 hours this night,she was up walking in the [place] intermittently,she anticipates possible discharge this day

## 2021-03-04 NOTE — PLAN OF CARE
Pt discharged home in stable condition , denies si . Given discharge meds , appointments and instructions . Discharged to

## 2021-03-04 NOTE — PLAN OF CARE
Group Psychotherapy     Topic: Shame     Hours: 1.0     Response: .Patient watched a short [facility] talk on Shame by Kevin Adair. Patient was able to identify certain times in her life in which she felt shame.  Patient able to complete the shame shield worksheet, shared with the group about how different instances at work in which she has felt shame and how she has dealt with the feelings associated with shame. Patient described her relationship with her brother and how she has felt shame because they do not agree on certain topics due to differences in Yarsani beliefs. Patient demonstrated good insight to shame and appears to have a clear understanding how it affects her.     Facilitator: Eva Ortega LPCC, LADC

## 2021-03-04 NOTE — DISCHARGE INSTRUCTIONS
Behavioral Discharge Planning and Instructions    Summary: You were admitted on 2/22/2021 due to Disorganized Thinking/Behaviors.  You were treated by Dr. Jonathon Carter and discharged on 3/4/21 from [facility]    Main Diagnosis: Phuong with psychosis    Health Care Follow-up:   Psychiatry Appointment Date/Time: March 15th at 8:00am     Psychiatrist/Primary Care Giver: Doctor Torres    Address: [facility] [place] [address]    Phone: [phone]    Please call the clinic to confirm appointment by Friday March 5th.      Attend all scheduled appointments with your outpatient providers. Call at least 24 hours in advance if you need to reschedule an appointment to ensure continued access to your outpatient providers.     Major Treatments, Procedures and Findings:  You were provided with: a psychiatric assessment, assessed for medical stability, medication evaluation and/or management, group therapy, family therapy and individual therapy    Symptoms to Report: feeling more aggressive, increased confusion, losing more sleep, mood getting worse or thoughts of suicide    Early warning signs can include: increased depression or anxiety sleep disturbances increased thoughts or behaviors of suicide or self-harm  increased unusual thinking, such as paranoia or hearing voices    Safety and Wellness:  Take all medicines as directed.  Make no changes unless your doctor suggests them.      Follow treatment recommendations.  Refrain from alcohol and non-prescribed drugs.  If there is a concern for safety, call [phone].    Resources:   Crisis Intervention: 799.824.4065 or 563-227-2045 (TTY: 499.101.9370).  Call anytime for help.  National [place] on Mental Illness (www.[place].negin.org): 975.140.4176 or 577-470-9507.  Suicide Awareness Voices of Education (SAVE) (www.save.org): 514-698-IDYY (5954)  National Suicide Prevention Line (www.mentalhealthmn.org): 582-315-NCRZ (1587)  Mental Health  "Consumer/Survivor Network of [place] (www.mhcsn.net): 724-735-5528 or 503-492-3798  Mental Health Association of [place] (www.mentalhealth.org): 581.721.7125 or 220-772-7935  [facility] Crisis Response 194-922-7037  Text 4 Life: txt \"LIFE\" to 01064 for immediate support and crisis intervention  Crisis text line: Text \"[place]\" to 385066. Free, confidential, 24/7.  Crisis Intervention: 282.577.5107 or 202-160-2996. Call anytime for help.     General Medication Instructions:   See your medication sheet(s) for instructions.   Take all medicines as directed.  Make no changes unless your doctor suggests them.   Go to all your doctor visits.  Be sure to have all your required lab tests. This way, your medicines can be refilled on time.  Do not use any drugs not prescribed by your doctor.  Avoid alcohol.    Advance Directives:   Scanned document on file with [employer]? No scanned doc  Is document scanned? Pt states no documents  Honoring Choices Your Rights Handout: Informed and given  Was more information offered? Pt declined    The Treatment team has appreciated the opportunity to work with you. If you have any questions or concerns about your recent admission, you can contact the unit which can receive your call 24 hours a day, 7 days a week. They will be able to get in touch with a Provider if needed. The unit number is [phone] .  "

## 2021-03-05 NOTE — DISCHARGE SUMMARY
"    ----------------------------------------------------------------------------------------------------------  [facility], [place]   Discharge Summary  Hospital Day #10  Radha James MRN# [id number]   Age: 55 year old YOB: 1965   Date of Admission:  2/22/2021  Date of Discharge:  3/4/2021  4:35 PM  Admitting Physician:  Jonathon Carter MD  Discharge Physician:  Jonathon Carter MD     Event Leading to Hospitalization:   \"Radha James is a 55 year old female previously diagnosed with depression who presented on 02/22/2021 with bizarre behavior and decreased sleep in the context of work stress, recent travel to [place], and recent initiation of antibiotics. \"    See Admission note by Jonathon Carter MD on 2/22/2021 for additional details.      Objective:   B/P: 131/80, T: 96, P: 84, R: 14  Psychiatric Examination:  Appearance:  awake, alert, dressed in hospital scrubs and appeared as age stated  Attitude:  cooperative and calm  Eye Contact:  good and much improved   Mood:  \"Good, thanks\"  Affect: Euthymic; appropriate and in normal range, mood congruent, intensity is normal and full range  Speech:  clear, coherent, rambling and somewhat rapid  Psychomotor Behavior:  no evidence of tardive dyskinesia, dystonia, or tics  Thought Process:  logical, goal oriented and circumstantial  Associations:  no loose associations  Thought Content:  no evidence of suicidal ideation or homicidal ideation and no evidence of psychotic thought  Insight:  good  Judgment:  fair  Oriented to:  person, place, time, situation  Attention Span and Concentration:  fair  Recent and Remote Memory:  fair  Language: fluent English with appropriate syntax  Fund of Knowledge: appropriate  Muscle Strength and Tone: appears normal   Gait and Station: normal gait, intact station     Hospital Course:   Diagnostic Impression:  Radha James is a 55 year old  White female previously diagnosed with " MDD who presents with elevated mood, bizarre behavior, and decreased sleep requirements in the context of job stress. Substances are not likely a contributing factor to their presentation, though she has ongoing nicotine and cannabis use. She has not changed use behavior for some years.  Biological contributions to mental health presentation include family history of bipolar disorder and ongoing substance use. Psychosocial factors contributing to current presentation include work stress and pandemic circumstances.     The patient's presentation is consistent with new onset lauren, though other diagnoses are under consideration. It is unclear if this episode is due to primary mood disorder or secondary to another cause. Both patient remarks and collateral from  support that this is her first episode. If due to bipolar disorder, it is suprising she has not had manic episodes prior to the age of 55. Other possible etiologies include antibiotic-induced lauren (unlikely due to antibiotic initation after florid lauren), lauren episode due to another medical condition (menopause unlikely as patient >1 year from last menstrual period, tickborne disease (recent insect bite in warmer climate), and delirium (possibly in the setting of tooth or urinary infection, but patient denies any symptoms at this time and is afebrile though had subjective fevers recently and urine sample in ED was + for casts and bacteria though contaminated with squamous cells). Diagnostic workup was negative for tic-borne illnesses and Brain MRI was unremarkable. Appears first onset lauren is due to bipolar disorder.    Psychiatric Course:  Radha James was admitted to station 22 as a voluntary patient. She was not on psychotropics PTA, and initiated quetiapine for sleep, which was titrated to 75mg at night.  Quetiapine was discontinued after Radha experienced a fall and orthostatic hypotension, and zyprexa was initiated. Zyprexa titrated to 5mg  "BID and 30mg at bedtime for stabilization, and then 5mg qAM and 30mg at bedtime thereafter. She was put on status individual observation (1:1 sitter) due to intrusive behavior, but this was discontinued after a few days. Over the course of hospitalization, Radha's sleep gradually improved; her speech became less pressured and rapid; and she stopped intrusive behavior. She never required restraints, seclusion, or forced sedation. On the day of discharge, Radha reported feeling \"good\", and did not experience SI or HI. She completed a safety plan of contacting her friends in  or her  if she noticed herself talking fast or not sleeping.  Jose was contacted for discharge planning, and he reported that Radha seemed near baseline. She hopes to return to her \"soul-sucking\" job eventually. Radha was discharged to her  for ride home with medications in hand.      Medical Course:  Patient was medically cleared prior to admission. Amoxicillin was held due to being unnecessary. Patient experienced a mechanical fall after slipping due to socks. She bumped her head, but on assessment had no change in mental status or appreciable bruise/bump, and her pupils were reactive to light. Lab tests and Brain MRI were unremarkable as part of the first episode work up.    Consults:   None    Risk Assessment:   Radha James has notable risk factors for self-harm, including age and \"soul sucking job\". However, risk is mitigated by commitment to family, absence of past attempts, ability to volunteer a safety plan and history of seeking help when needed. Additional steps taken to minimize risk include: close outpatient follow up and medication in hand. Therefore, based on all available evidence including the factors cited above, Radha James does not appear to be an imminent danger to self or others and is appropriate for outpatient level of care. However, if patient uses substances or is non-adherent with " medication, their risk of decompensation and SI/HI will be elevated. This was discussed with the patient.    This document serves as a transfer of care to Radha James's outpatient providers.     Diagnoses:   # Bipolar Disorder, type I, current episode lauren, with psychosis  # Cannabis use disorder  # Tobacco use disorder   Discharge Plan:   Patient is being discharged to home with the following medications and appointments as detailed below:    Medications:  Added/Changed:  - olanzapine 5mg qAM, 30mg qHS  Continued:  - N/A  Discontinued:  - Amoxicillin    Health Care Follow-up:   Psychiatry Appointment Date/Time:  at 8:00am   Psychiatrist/Primary Care Giver: Doctor Torres  Address: [facility], [facility] [place] [address]  Phone: [phone]    Pt seen and discussed with my attending, MD Gregor Mcadams MD  PGY-1 Psychiatry Resident      Attestation:   The patient has been seen and evaluated by me,  Jonathon Carter MD. I have examined the patient today and reviewed the discharge plan with the resident and medical student. I agree with the final assessment and plan, as noted in the discharge summary. I have reviewed today's vital signs, medications, labs and imaging.  Total time discharge plannin minutes  Jonathon Carter MD ,Ph.D.           Appendix A: All Labs This Admission:     Results for orders placed or performed during the hospital encounter of 21   Head CT w/o contrast     Status: None    Narrative    CT SCAN OF THE HEAD WITHOUT CONTRAST   2021 3:21 PM     HISTORY: Mental status change, unknown cause. Rambling speech.  Confusion.    TECHNIQUE:  Axial images of the head and coronal reformations without  IV contrast material.  Radiation dose for this scan was reduced using  automated exposure control, adjustment of the mA and/or kV according  to patient size, or iterative reconstruction technique.    COMPARISON: None.    FINDINGS:  The  ventricles are normal in size, shape and configuration.   The brain parenchyma and subarachnoid spaces are normal. There is no  evidence of intracranial hemorrhage, mass, acute infarct or anomaly.     The visualized portions of the sinuses and mastoids appear normal.  There is no evidence of trauma.      Impression    IMPRESSION: Normal CT scan of the head.      DESIREE FRAZIER MD   MRI Brain w/o contrast     Status: None    Narrative    MR BRAIN W/O CONTRAST 3/2/2021 4:58 PM    Provided History: Psychosis; Mental status change, unknown cause    Comparison:  Head CT 2/22/2021     Technique: Sagittal T1-weighted and axial T2-weighted, turboFLAIR and  diffusion-weighted with ADC map images of the brain were obtained  without intravenous contrast.    Findings: These images reveal no intracranial mass lesion, mass  effect, midline shift or abnormal extraaxial fluid collection. The  ventricles and sulci are normal for age. No abnormality of reduced  diffusion.  Normal intravascular flow voids.      Impression    Impression: Essentially normal brain MRI.    I have personally reviewed the examination and initial interpretation  and I agree with the findings.    DAT ALAS MD   CBC with platelets differential     Status: None   Result Value Ref Range    WBC 7.6 4.0 - 11.0 10e9/L    RBC Count 4.71 3.8 - 5.2 10e12/L    Hemoglobin 15.4 11.7 - 15.7 g/dL    Hematocrit 44.6 35.0 - 47.0 %    MCV 95 78 - 100 fl    MCH 32.7 26.5 - 33.0 pg    MCHC 34.5 31.5 - 36.5 g/dL    RDW 11.9 10.0 - 15.0 %    Platelet Count 306 150 - 450 10e9/L    Diff Method Automated Method     % Neutrophils 62.2 %    % Lymphocytes 25.6 %    % Monocytes 10.6 %    % Eosinophils 0.8 %    % Basophils 0.5 %    % Immature Granulocytes 0.3 %    Nucleated RBCs 0 0 /100    Absolute Neutrophil 4.7 1.6 - 8.3 10e9/L    Absolute Lymphocytes 1.9 0.8 - 5.3 10e9/L    Absolute Monocytes 0.8 0.0 - 1.3 10e9/L    Absolute Eosinophils 0.1 0.0 - 0.7 10e9/L    Absolute Basophils  0.0 0.0 - 0.2 10e9/L    Abs Immature Granulocytes 0.0 0 - 0.4 10e9/L    Absolute Nucleated RBC 0.0    INR     Status: None   Result Value Ref Range    INR 1.02 0.86 - 1.14   Comprehensive metabolic panel     Status: Abnormal   Result Value Ref Range    Sodium 142 133 - 144 mmol/L    Potassium 3.5 3.4 - 5.3 mmol/L    Chloride 108 94 - 109 mmol/L    Carbon Dioxide 25 20 - 32 mmol/L    Anion Gap 9 3 - 14 mmol/L    Glucose 101 (H) 70 - 99 mg/dL    Urea Nitrogen 13 7 - 30 mg/dL    Creatinine 0.84 0.52 - 1.04 mg/dL    GFR Estimate 78 >60 mL/min/[1.73_m2]    GFR Estimate If Black >90 >60 mL/min/[1.73_m2]    Calcium 9.0 8.5 - 10.1 mg/dL    Bilirubin Total 1.2 0.2 - 1.3 mg/dL    Albumin 3.8 3.4 - 5.0 g/dL    Protein Total 7.7 6.8 - 8.8 g/dL    Alkaline Phosphatase 67 40 - 150 U/L    ALT 20 0 - 50 U/L    AST 19 0 - 45 U/L   TSH     Status: None   Result Value Ref Range    TSH 1.36 0.40 - 4.00 mU/L   UA with Microscopic reflex to Culture     Status: Abnormal    Specimen: Urine clean catch; Midstream Urine   Result Value Ref Range    Color Urine Yellow     Appearance Urine Clear     Glucose Urine Negative NEG^Negative mg/dL    Bilirubin Urine Negative NEG^Negative    Ketones Urine Negative NEG^Negative mg/dL    Specific Gravity Urine 1.023 1.003 - 1.035    Blood Urine Negative NEG^Negative    pH Urine 5.5 5.0 - 7.0 pH    Protein Albumin Urine 10 (A) NEG^Negative mg/dL    Urobilinogen mg/dL Normal 0.0 - 2.0 mg/dL    Nitrite Urine Negative NEG^Negative    Leukocyte Esterase Urine Negative NEG^Negative    Source Midstream Urine     WBC Urine 7 (H) 0 - 5 /HPF    RBC Urine 1 0 - 2 /HPF    Bacteria Urine Few (A) NEG^Negative /HPF    Squamous Epithelial /HPF Urine 4 (H) 0 - 1 /HPF    Mucous Urine Present (A) NEG^Negative /LPF    Hyaline Casts 3 (H) 0 - 2 /LPF   Alcohol ethyl     Status: None   Result Value Ref Range    Ethanol g/dL <0.01 <0.01 g/dL   Drug abuse screen 6 urine (chem dep)     Status: Abnormal   Result Value Ref Range     Amphetamine Qual Urine Negative NEG^Negative    Barbiturates Qual Urine Negative NEG^Negative    Benzodiazepine Qual Urine Negative NEG^Negative    Cannabinoids Qual Urine Positive (A) NEG^Negative    Cocaine Qual Urine Negative NEG^Negative    Ethanol Qual Urine Negative NEG^Negative    Opiates Qualitative Urine Negative NEG^Negative   Asymptomatic SARS-CoV-2 COVID-19 Virus (Coronavirus) by PCR     Status: None    Specimen: Nasopharyngeal   Result Value Ref Range    SARS-CoV-2 Virus Specimen Source Nasopharyngeal     SARS-CoV-2 PCR Result NEGATIVE     SARS-CoV-2 PCR Comment (Note)    Lipid panel     Status: Abnormal   Result Value Ref Range    Cholesterol 212 (H) <200 mg/dL    Triglycerides 114 <150 mg/dL    HDL Cholesterol 66 >49 mg/dL    LDL Cholesterol Calculated 123 (H) <100 mg/dL    Non HDL Cholesterol 146 (H) <130 mg/dL   Folate     Status: None   Result Value Ref Range    Folate 19.6 >5.4 ng/mL   Hematocrit     Status: None   Result Value Ref Range    Hematocrit 44.8 35.0 - 47.0 %   Vitamin B12     Status: None   Result Value Ref Range    Vitamin B12 490 193 - 986 pg/mL   Vitamin D     Status: Abnormal   Result Value Ref Range    Vitamin D Deficiency screening 19 (L) 20 - 75 ug/L   Lyme Confirm IgG by Immunoblot     Status: None   Result Value Ref Range    Lyme Confirm IgG by Immunoblot Negative Negative   Ehrlichia Anaplasma Sp by PCR     Status: None   Result Value Ref Range    Anaplasma phagocytophilum Not Detected     Ehrlichia chaffeensis Not Detected     Ehrlichia ewingii/canis Not Detected     Ehrlichia muris-like Not Detected    Internal Medicine Adult IP Consult for [facility] in [place] Building: Patient to be seen: Routine within 24 hrs; Call back #: ; f/u; Consultant may enter orders: Yes; Requesting provider? Hospitalist (if different from attending physic...     Status: None ()    Narrative    Nasima Burden PA-C     2/28/2021  3:53 PM  [facility]  [facility]  Consult Note - Hospitalist Service     Date of Admission:  2/22/2021  Consult Requested by: Jonathon Carter MD  Reason for Consult: Swelling of upper lip     Assessment & Plan   Radha James is a 55 year old female with PMHx of depression   admitted on 2/22/2021 to inpatient psychiatry for lauren.     #Lip swelling with underlying oral ulcer   DDx include lip trauma from bite, HSV, and less likely allergic   reaction or angioedema. Patient has superficial ulcer under   swelling and is TTP, suggestive of trauma like accidental bite.   Not on any ACE/ARB. Only new medications are zyprexa which was   started 4 days ago.   -Continue benadryl PRN  -Peridex swish and spit   -Please notify medicine immediately if patient swelling worsens,   or if patient develops any swelling of tongue, throat or develops   SOB. Photos taken today for future comparison      #Vit D deficiency   Vitamin D level just below normal (20-75) low at 19.   -Cholecalciferol 400 Units daily     #Hypercholesterolemia   Total cholesterol 212, , HDL 66. No hx of CAD or CVA.   -Lifestyle modifications with diet and exercise and follow up   with PCP     #Depression   #Lauren  Started on zyprexa for lauren.   -Management per psychiatry       Medicine will sign off. No further recommendations at this time.   Please page the on-call WILLY for any intercurrent medical issues   which arise.     MERCEDES Betancourt [facility]  Contact information available via [facility] Paging/Directory    __________________________________________________________________  ____    Chief Complaint   Upper lip swelling    History is obtained from the patient    History of Present Illness   Radha James is a 55 year old female with PMHx of depression   admitted on 2/22/2021 to inpatient psychiatry for lauren.     Patient is a poor historian.  States she woke up yesterday with   swelling in her upper lip which  "may or may not be better today.    Denies any injury but states she could have bitten her lip in her   sleep.  Patient states her only new medications are psych   medications including Zyprexa.  Patient did receive a dose of   nicotine lozenge yesterday.  Denies any known new foods or new   environmental factors.  Patient is not on ACE/ARB.  states she   has had a history of mouth ulcers on the side of her cheek will   feel similar to this.  Patient states she her lip swelling is   sore and tender.  States this morning when she woke up she did   feel short of breath and endorsed throat swelling which quickly   resolved on its own.  Denies any current throat swelling, tongue   swelling, shortness of breath or nausea vomiting.  Endorses hives   when she is under stress and states she has a rash on her hands   which is itchy\" feels like sandpaper\".  Denies any history of   known herpes but has occasional cold sores on her lips.  Patient   denies any tobacco use but states she vapes marijuana.  Patient   denies any prior similar episode.    Of note patient states she is having right leg pain over area   where she had repair of a fracture when she was 13 years old.    Also endorsing symptoms of sciatica where pain radiates from her   right hip down to her right calf.  States it improves with   Tylenol.  Denies any calf redness or swelling.  Denies any   history of blood clots.    When discussed with nurse patient woke up this morning with   swelling of the right side of her upper lip.  Patient was given   Benadryl with reported improvement to the nurse earlier today.    Review of Systems   The 5 point Review of Systems is negative other than noted in the   HPI or here.     Past Medical History    I have reviewed this patient's medical history and updated it   with pertinent information if needed.   Past Medical History:   Diagnosis Date     Depression        Past Surgical History   I have reviewed this patient's surgical " history and updated it   with pertinent information if needed.  Past Surgical History:   Procedure Laterality Date     [place] teeth extraction         Social History   I have reviewed this patient's social history and updated it with   pertinent information if needed.  Social History     Tobacco Use     Smoking status: Current Every Day Smoker     Types: Vaping Device     Smokeless tobacco: Never Used   Substance Use Topics     Alcohol use: Yes     Alcohol/week: 1.0 standard drinks     Types: 1 Shots of liquor per week     Comment: socially once a month     Drug use: Yes     Comment: Vaping - THC       Family History   I have reviewed this patient's family history and updated it with   pertinent information if needed.  Family History   Problem Relation Age of Onset     Colon Cancer Mother      Breast Cancer Paternal Grandmother        Medications   I have reviewed this patient's current medications  Current Facility-Administered Medications   Medication     acetaminophen (TYLENOL) tablet 650 mg     alum & mag hydroxide-simethicone (MAALOX) suspension 30 mL     diphenhydrAMINE (BENADRYL) capsule 50 mg     famotidine (PEPCID) tablet 20 mg     haloperidol lactate (HALDOL) injection 5 mg    Or     haloperidol (HALDOL) tablet 5 mg     hydrOXYzine (ATARAX) tablet 25 mg     LORazepam (ATIVAN) tablet 1 mg     melatonin tablet 3 mg     nicotine (COMMIT) lozenge 4 mg     OLANZapine (zyPREXA) tablet 30 mg     OLANZapine (zyPREXA) tablet 5 mg     polyethylene glycol (MIRALAX) Packet 17 g     senna-docusate (SENOKOT-S/PERICOLACE) 8.6-50 MG per tablet 1   tablet       Allergies   No Known Allergies    Physical Exam   Vital Signs: Temp: 97  F (36.1  C) Temp src: Tympanic BP: 138/87   Pulse: 92   Resp: 14 SpO2: 99 % O2 Device: None (Room air)    Weight: 218 lbs 14.4 oz  GENERAL: Alert and awake. NAD. Pleasant and conversational   HEENT: AT/NC. Anicteric sclera. EOMI. Mucous membranes moist.   Edema of R side of upper lip, with  superficial ulceration on   mucous membrane underlying swelling, which is tender to   palpation. No other oral lesions visualized. No tongue edema or   pharyngeal edema.   NECK: No submandibular or cervical lymphadenopathy.   CARDIOVASCULAR: RRR. S1, S2. No murmurs, rubs, or gallops.   RESPIRATORY: Effort normal on RA. Clear to auscultation   bilaterally, no rales, rhonchi or wheezes, no use of accessory   muscles, no retractions, respirations unlabored   GI: Abdomen soft, non-tender abdomen.  EXTREMITIES: No peripheral edema. No calf asymmetry, erythema, or   tenderness.   NEUROLOGICAL: No focal deficits. CN II-XII grossly intact. Moving   all extremities symmetrically. Steady gait.  SKIN: Intact. Warm and dry. No jaundice. Mild faint erythema   overlying dorsum of hand and digits, without discrete border and   with dry skin. No warmth or edema.     Data   No results found for this or any previous visit (from the past 24   hour(s)).

## 2021-05-01 ENCOUNTER — HEALTH MAINTENANCE LETTER (OUTPATIENT)
Age: 56
End: 2021-05-01

## 2021-06-10 ENCOUNTER — TRANSCRIBE ORDERS (OUTPATIENT)
Dept: OTHER | Age: 56
End: 2021-06-10

## 2021-06-10 DIAGNOSIS — I25.10 CORONARY ARTERY DISEASE INVOLVING NATIVE CORONARY ARTERY OF NATIVE HEART WITHOUT ANGINA PECTORIS: Primary | ICD-10-CM

## 2021-10-11 ENCOUNTER — HEALTH MAINTENANCE LETTER (OUTPATIENT)
Age: 56
End: 2021-10-11

## 2022-05-22 ENCOUNTER — HEALTH MAINTENANCE LETTER (OUTPATIENT)
Age: 57
End: 2022-05-22

## 2022-09-25 ENCOUNTER — HEALTH MAINTENANCE LETTER (OUTPATIENT)
Age: 57
End: 2022-09-25

## 2023-05-08 ENCOUNTER — HEALTH MAINTENANCE LETTER (OUTPATIENT)
Age: 58
End: 2023-05-08

## 2023-06-04 ENCOUNTER — HEALTH MAINTENANCE LETTER (OUTPATIENT)
Age: 58
End: 2023-06-04

## 2023-12-29 ENCOUNTER — APPOINTMENT (OUTPATIENT)
Dept: CT IMAGING | Facility: CLINIC | Age: 58
End: 2023-12-29
Attending: EMERGENCY MEDICINE
Payer: COMMERCIAL

## 2023-12-29 ENCOUNTER — HOSPITAL ENCOUNTER (EMERGENCY)
Facility: CLINIC | Age: 58
Discharge: HOME | End: 2023-12-30
Attending: EMERGENCY MEDICINE | Admitting: EMERGENCY MEDICINE
Payer: COMMERCIAL

## 2023-12-29 DIAGNOSIS — R07.9 CHEST PAIN, UNSPECIFIED TYPE: ICD-10-CM

## 2023-12-29 DIAGNOSIS — R10.84 DIFFUSE ABDOMINAL PAIN: ICD-10-CM

## 2023-12-29 DIAGNOSIS — R06.02 SOB (SHORTNESS OF BREATH): ICD-10-CM

## 2023-12-29 DIAGNOSIS — R11.2 NAUSEA AND VOMITING, UNSPECIFIED VOMITING TYPE: ICD-10-CM

## 2023-12-29 DIAGNOSIS — K29.00 ACUTE SUPERFICIAL GASTRITIS WITHOUT HEMORRHAGE: Primary | ICD-10-CM

## 2023-12-29 LAB
ALBUMIN SERPL BCG-MCNC: 4.3 G/DL (ref 3.5–5.2)
ALP SERPL-CCNC: 82 U/L (ref 40–150)
ALT SERPL W P-5'-P-CCNC: 28 U/L (ref 0–50)
ANION GAP SERPL CALCULATED.3IONS-SCNC: 20 MMOL/L (ref 7–15)
AST SERPL W P-5'-P-CCNC: 25 U/L (ref 0–45)
B-OH-BUTYR SERPL-SCNC: 0.3 MMOL/L
BASE EXCESS BLDV CALC-SCNC: -2.7 MMOL/L (ref -7.7–1.9)
BASOPHILS # BLD AUTO: 0 10E3/UL (ref 0–0.2)
BASOPHILS NFR BLD AUTO: 0 %
BILIRUB SERPL-MCNC: 0.9 MG/DL
BUN SERPL-MCNC: 10.5 MG/DL (ref 6–20)
CALCIUM SERPL-MCNC: 9.6 MG/DL (ref 8.6–10)
CHLORIDE SERPL-SCNC: 98 MMOL/L (ref 98–107)
CREAT SERPL-MCNC: 0.7 MG/DL (ref 0.51–0.95)
DEPRECATED HCO3 PLAS-SCNC: 17 MMOL/L (ref 22–29)
EGFRCR SERPLBLD CKD-EPI 2021: >90 ML/MIN/1.73M2
EOSINOPHIL # BLD AUTO: 0 10E3/UL (ref 0–0.7)
EOSINOPHIL NFR BLD AUTO: 0 %
ERYTHROCYTE [DISTWIDTH] IN BLOOD BY AUTOMATED COUNT: 12.8 % (ref 10–15)
GLUCOSE SERPL-MCNC: 162 MG/DL (ref 70–99)
HCO3 BLDV-SCNC: 20 MMOL/L (ref 21–28)
HCT VFR BLD AUTO: 44.1 % (ref 35–47)
HGB BLD-MCNC: 15.4 G/DL (ref 11.7–15.7)
HOLD SPECIMEN: NORMAL
HOLD SPECIMEN: NORMAL
IMM GRANULOCYTES # BLD: 0 10E3/UL
IMM GRANULOCYTES NFR BLD: 0 %
LYMPHOCYTES # BLD AUTO: 1.1 10E3/UL (ref 0.8–5.3)
LYMPHOCYTES NFR BLD AUTO: 10 %
MCH RBC QN AUTO: 32.2 PG (ref 26.5–33)
MCHC RBC AUTO-ENTMCNC: 34.9 G/DL (ref 31.5–36.5)
MCV RBC AUTO: 92 FL (ref 78–100)
MONOCYTES # BLD AUTO: 0.3 10E3/UL (ref 0–1.3)
MONOCYTES NFR BLD AUTO: 3 %
NEUTROPHILS # BLD AUTO: 8.8 10E3/UL (ref 1.6–8.3)
NEUTROPHILS NFR BLD AUTO: 87 %
NRBC # BLD AUTO: 0 10E3/UL
NRBC BLD AUTO-RTO: 0 /100
O2/TOTAL GAS SETTING VFR VENT: 0 %
PCO2 BLDV: 30 MM HG (ref 40–50)
PH BLDV: 7.44 [PH] (ref 7.32–7.43)
PLATELET # BLD AUTO: 268 10E3/UL (ref 150–450)
PO2 BLDV: 25 MM HG (ref 25–47)
POTASSIUM SERPL-SCNC: 3.7 MMOL/L (ref 3.4–5.3)
PROT SERPL-MCNC: 7.9 G/DL (ref 6.4–8.3)
RBC # BLD AUTO: 4.78 10E6/UL (ref 3.8–5.2)
SODIUM SERPL-SCNC: 135 MMOL/L (ref 135–145)
TROPONIN T SERPL HS-MCNC: <6 NG/L
TROPONIN T SERPL HS-MCNC: <6 NG/L
WBC # BLD AUTO: 10.2 10E3/UL (ref 4–11)

## 2023-12-29 PROCEDURE — 36415 COLL VENOUS BLD VENIPUNCTURE: CPT | Performed by: EMERGENCY MEDICINE

## 2023-12-29 PROCEDURE — 80053 COMPREHEN METABOLIC PANEL: CPT | Performed by: EMERGENCY MEDICINE

## 2023-12-29 PROCEDURE — 85025 COMPLETE CBC W/AUTO DIFF WBC: CPT | Performed by: EMERGENCY MEDICINE

## 2023-12-29 PROCEDURE — 82040 ASSAY OF SERUM ALBUMIN: CPT | Performed by: EMERGENCY MEDICINE

## 2023-12-29 PROCEDURE — 71275 CT ANGIOGRAPHY CHEST: CPT

## 2023-12-29 PROCEDURE — 250N000011 HC RX IP 250 OP 636: Performed by: EMERGENCY MEDICINE

## 2023-12-29 PROCEDURE — 258N000003 HC RX IP 258 OP 636: Performed by: EMERGENCY MEDICINE

## 2023-12-29 PROCEDURE — 84484 ASSAY OF TROPONIN QUANT: CPT | Performed by: EMERGENCY MEDICINE

## 2023-12-29 PROCEDURE — 96374 THER/PROPH/DIAG INJ IV PUSH: CPT | Mod: 59

## 2023-12-29 PROCEDURE — 96375 TX/PRO/DX INJ NEW DRUG ADDON: CPT

## 2023-12-29 PROCEDURE — 82010 KETONE BODYS QUAN: CPT | Performed by: EMERGENCY MEDICINE

## 2023-12-29 PROCEDURE — 99285 EMERGENCY DEPT VISIT HI MDM: CPT | Mod: 25

## 2023-12-29 PROCEDURE — 93005 ELECTROCARDIOGRAM TRACING: CPT

## 2023-12-29 PROCEDURE — 82803 BLOOD GASES ANY COMBINATION: CPT | Performed by: EMERGENCY MEDICINE

## 2023-12-29 PROCEDURE — 87637 SARSCOV2&INF A&B&RSV AMP PRB: CPT | Performed by: EMERGENCY MEDICINE

## 2023-12-29 RX ORDER — HYDROMORPHONE HYDROCHLORIDE 1 MG/ML
0.5 INJECTION, SOLUTION INTRAMUSCULAR; INTRAVENOUS; SUBCUTANEOUS
Status: DISCONTINUED | OUTPATIENT
Start: 2023-12-29 | End: 2023-12-30 | Stop reason: HOSPADM

## 2023-12-29 RX ORDER — ONDANSETRON 2 MG/ML
4 INJECTION INTRAMUSCULAR; INTRAVENOUS ONCE
Status: COMPLETED | OUTPATIENT
Start: 2023-12-29 | End: 2023-12-29

## 2023-12-29 RX ORDER — ONDANSETRON 4 MG/1
4 TABLET, ORALLY DISINTEGRATING ORAL ONCE
Status: COMPLETED | OUTPATIENT
Start: 2023-12-29 | End: 2023-12-29

## 2023-12-29 RX ORDER — METOCLOPRAMIDE HYDROCHLORIDE 5 MG/ML
10 INJECTION INTRAMUSCULAR; INTRAVENOUS ONCE
Status: COMPLETED | OUTPATIENT
Start: 2023-12-29 | End: 2023-12-29

## 2023-12-29 RX ORDER — DIPHENHYDRAMINE HYDROCHLORIDE 50 MG/ML
25 INJECTION INTRAMUSCULAR; INTRAVENOUS ONCE
Status: COMPLETED | OUTPATIENT
Start: 2023-12-29 | End: 2023-12-29

## 2023-12-29 RX ORDER — IOPAMIDOL 755 MG/ML
500 INJECTION, SOLUTION INTRAVASCULAR ONCE
Status: COMPLETED | OUTPATIENT
Start: 2023-12-30 | End: 2023-12-30

## 2023-12-29 RX ADMIN — ONDANSETRON 4 MG: 4 TABLET, ORALLY DISINTEGRATING ORAL at 19:36

## 2023-12-29 RX ADMIN — DIPHENHYDRAMINE HYDROCHLORIDE 25 MG: 50 INJECTION, SOLUTION INTRAMUSCULAR; INTRAVENOUS at 23:24

## 2023-12-29 RX ADMIN — HYDROMORPHONE HYDROCHLORIDE 0.5 MG: 1 INJECTION, SOLUTION INTRAMUSCULAR; INTRAVENOUS; SUBCUTANEOUS at 23:31

## 2023-12-29 RX ADMIN — SODIUM CHLORIDE 1000 ML: 9 INJECTION, SOLUTION INTRAVENOUS at 23:31

## 2023-12-29 RX ADMIN — METOCLOPRAMIDE HYDROCHLORIDE 10 MG: 5 INJECTION INTRAMUSCULAR; INTRAVENOUS at 23:25

## 2023-12-29 RX ADMIN — SODIUM CHLORIDE 1000 ML: 9 INJECTION, SOLUTION INTRAVENOUS at 22:29

## 2023-12-29 RX ADMIN — ONDANSETRON 4 MG: 2 INJECTION INTRAMUSCULAR; INTRAVENOUS at 22:29

## 2023-12-29 ASSESSMENT — ACTIVITIES OF DAILY LIVING (ADL): ADLS_ACUITY_SCORE: 35

## 2023-12-30 VITALS
SYSTOLIC BLOOD PRESSURE: 116 MMHG | OXYGEN SATURATION: 96 % | RESPIRATION RATE: 22 BRPM | HEART RATE: 78 BPM | TEMPERATURE: 97.5 F | DIASTOLIC BLOOD PRESSURE: 61 MMHG

## 2023-12-30 LAB
FLUAV RNA SPEC QL NAA+PROBE: NEGATIVE
FLUBV RNA RESP QL NAA+PROBE: NEGATIVE
RSV RNA SPEC NAA+PROBE: NEGATIVE
SARS-COV-2 RNA RESP QL NAA+PROBE: NEGATIVE

## 2023-12-30 PROCEDURE — 250N000011 HC RX IP 250 OP 636: Performed by: EMERGENCY MEDICINE

## 2023-12-30 RX ORDER — ONDANSETRON 4 MG/1
4 TABLET, ORALLY DISINTEGRATING ORAL EVERY 8 HOURS PRN
Qty: 10 TABLET | Refills: 0 | Status: SHIPPED | OUTPATIENT
Start: 2023-12-30 | End: 2024-01-03

## 2023-12-30 RX ADMIN — IOPAMIDOL 100 ML: 755 INJECTION, SOLUTION INTRAVENOUS at 00:05

## 2023-12-30 ASSESSMENT — ACTIVITIES OF DAILY LIVING (ADL): ADLS_ACUITY_SCORE: 35

## 2023-12-30 NOTE — ED PROVIDER NOTES
History   Chief Complaint:  Nausea & Vomiting, Abdominal Pain, and Chest Pain     HPI   Radha James is a 58 year old female with past medical history of CAD, hypertension, and obesity who presents with diffuse abdominal discomfort, nausea and vomiting since this afternoon.  Patient notes she had some nausea yesterday evening which progressed into severe abdominal pain and vomiting this afternoon.  She notes some urinary frequency but no active dysuria.  Notes a history of kidney stones that required lithotripsy but ultimately  never required stent placement.  She has not had any abdominal surgeries.  Denies any lower extremity swelling.  Denies fevers.  Notes normal BMs.     Independent Historian:   None - Patient Only    Medications:    ondansetron (ZOFRAN ODT) 4 MG ODT tab  ibuprofen (ADVIL/MOTRIN) 200 MG tablet  medical cannabis (Patient's own supply)  OLANZapine (ZYPREXA) 10 MG tablet  OLANZapine (ZYPREXA) 20 MG tablet      Past Medical History:    Past Medical History:   Diagnosis Date    Depression      Past Surgical History:    Past Surgical History:   Procedure Laterality Date    [place] teeth extraction        Physical Exam   Patient Vitals for the past 24 hrs:   BP Temp Temp src Pulse Resp SpO2   12/30/23 0030 121/59 -- -- 84 -- 96 %   12/30/23 0015 -- -- -- -- -- 96 %   12/30/23 0005 (!) 162/86 -- -- 83 -- 100 %   12/29/23 2345 -- -- -- -- -- (!) 87 %   12/29/23 2335 -- -- -- -- -- 100 %   12/29/23 2300 (!) 164/90 -- -- 73 -- 100 %   12/29/23 1934 (!) 165/112 97.5  F (36.4  C) Oral 69 22 100 %      General: Alert, appears well-developed and well-nourished. Cooperative.     In moderate distress  HEENT:  Head:  Atraumatic  Ears:  External ears are normal  Mouth/Throat:  Oropharynx is without erythema or exudate and mucous membranes are dry.   Eyes:   Conjunctivae normal and EOM are normal. No scleral icterus.  CV:  Normal rate, regular rhythm, normal heart sounds and radial pulses are 2+ and  symmetric.  No murmur.  Resp:  Breath sounds are clear bilaterally    Non-labored, no retractions or accessory muscle use  GI:  Obese. Abdomen is soft, mild distention, diffuse tenderness. No rebound or guarding.  No CVA tenderness bilaterally  MS:  Normal range of motion. No edema.    Normal strength in all 4 extremities.     Back atraumatic.    No midline cervical, thoracic, or lumbar tenderness  Skin:  Warm and dry.  No rash or lesions noted.  Neuro:   Alert. Normal strength.  GCS: 15  Psych: Normal mood and affect.    Emergency Department Course     ECG results from 12/29/23   EKG 12-lead, tracing only     Value    Systolic Blood Pressure     Diastolic Blood Pressure     Ventricular Rate 64    Atrial Rate 64    MI Interval 170    QRS Duration 84        QTc 476    P Axis 62    R AXIS 60    T Axis 62    Interpretation ECG      Sinus rhythm  Normal ECG  No previous ECGs available  EKG read by me at 2323.       Imaging:  CT Chest PE Abdomen Pelvis w Contrast   Final Result   IMPRESSION:   1.  There is no pulmonary embolus, aortic aneurysm or dissection.   2.  Dependent probable atelectasis in the lower lobes bilaterally.   3.  Minimal interstitial edema at the lung bases.   4.  Colonic diverticula without acute diverticulitis. There is no bowel obstruction or inflammation.         Report per radiology    Laboratory:  Labs Ordered and Resulted from Time of ED Arrival to Time of ED Departure   COMPREHENSIVE METABOLIC PANEL - Abnormal       Result Value    Sodium 135      Potassium 3.7      Carbon Dioxide (CO2) 17 (*)     Anion Gap 20 (*)     Urea Nitrogen 10.5      Creatinine 0.70      GFR Estimate >90      Calcium 9.6      Chloride 98      Glucose 162 (*)     Alkaline Phosphatase 82      AST 25      ALT 28      Protein Total 7.9      Albumin 4.3      Bilirubin Total 0.9     CBC WITH PLATELETS AND DIFFERENTIAL - Abnormal    WBC Count 10.2      RBC Count 4.78      Hemoglobin 15.4      Hematocrit 44.1      MCV 92       MCH 32.2      MCHC 34.9      RDW 12.8      Platelet Count 268      % Neutrophils 87      % Lymphocytes 10      % Monocytes 3      % Eosinophils 0      % Basophils 0      % Immature Granulocytes 0      NRBCs per 100 WBC 0      Absolute Neutrophils 8.8 (*)     Absolute Lymphocytes 1.1      Absolute Monocytes 0.3      Absolute Eosinophils 0.0      Absolute Basophils 0.0      Absolute Immature Granulocytes 0.0      Absolute NRBCs 0.0     BLOOD GAS VENOUS - Abnormal    pH Venous 7.44 (*)     pCO2 Venous 30 (*)     pO2 Venous 25      Bicarbonate Venous 20 (*)     Base Excess/Deficit -2.7      FIO2 0     TROPONIN T, HIGH SENSITIVITY - Normal    Troponin T, High Sensitivity <6     TROPONIN T, HIGH SENSITIVITY - Normal    Troponin T, High Sensitivity <6     KETONE BETA-HYDROXYBUTYRATE QUANTITATIVE, RAPID - Normal    Ketone (Beta-Hydroxybutyrate) Quantitative 0.30     INFLUENZA A/B, RSV, & SARS-COV2 PCR - Normal    Influenza A PCR Negative      Influenza B PCR Negative      RSV PCR Negative      SARS CoV2 PCR Negative       Procedures     Emergency Department Course & Assessments:       Interventions:  Medications   HYDROmorphone (PF) (DILAUDID) injection 0.5 mg (0.5 mg Intravenous $Given 12/29/23 2331)   ondansetron (ZOFRAN ODT) ODT tab 4 mg (4 mg Oral $Given 12/29/23 1936)   sodium chloride 0.9% BOLUS 1,000 mL (0 mLs Intravenous Stopped 12/30/23 0109)   ondansetron (ZOFRAN) injection 4 mg (4 mg Intravenous $Given 12/29/23 2229)   metoclopramide (REGLAN) injection 10 mg (10 mg Intravenous $Given 12/29/23 2325)   diphenhydrAMINE (BENADRYL) injection 25 mg (25 mg Intravenous $Given 12/29/23 2324)   sodium chloride 0.9% BOLUS 1,000 mL (0 mLs Intravenous Stopped 12/30/23 0109)   sodium chloride (PF) 0.9% PF flush 100 mL (90 mLs Intravenous $Given 12/30/23 0005)   iopamidol (ISOVUE-370) solution 500 mL (100 mLs Intravenous $Given 12/30/23 0005)      Independent Interpretation (X-rays, CTs, rhythm  strip):  None    Consultations/Discussion of Management or Tests:        Social Determinants of Health affecting care:   None    Disposition:  The patient was discharged to home.     Impression & Plan    CMS Diagnoses: None    Medical Decision Making:  Patient is a 58-year-old female who presents with diffuse abdominal pain, nausea vomiting, chest pain and shortness of breath.  A broad workup was ultimately pursued.  Thankfully EKG shows no concerning ischemic changes.  Troponin undetectable and low suspicion for ACS.  Patient had normal electrolytes, renal function, and CBC.  Anion gap elevation although thankfully no evidence of acidosis.  Patient has no elevated ketones.  Low concern for diabetic ketoacidosis.  Influenza, RSV, COVID-negative.  Chest abdomen pelvis CT was obtained and thankfully no evidence of pulmonary embolism, aortic dissection, aortic aneurysm, pneumonia, bowel obstruction, or other sinister intra-abdominal abnormalities was identified.  Nonspecific interstitial edema at the lung bases.  Suspect presentation consistent with acute gastritis.  This may be viral in nature.  She felt fully improved after Reglan and Benadryl although Zofran initially did not work very well on initial arrival.  She was able to tolerate oral intake on my final recheck and with her abdominal pain fully resolved plan for discharge home with plans for close outpatient follow-up with her primary care provider.  If symptoms become more chronic in the near future she may require referral to gastroenterology in the outpatient setting.  After return precautions understood and all questions answered, discharged home in care of .    Diagnosis:    ICD-10-CM    1. Acute superficial gastritis without hemorrhage  K29.00       2. Nausea and vomiting, unspecified vomiting type  R11.2       3. Diffuse abdominal pain  R10.84       4. Chest pain, unspecified type  R07.9       5. SOB (shortness of breath)  R06.02           Discharge Medications:  New Prescriptions    ONDANSETRON (ZOFRAN ODT) 4 MG ODT TAB    Take 1 tablet (4 mg) by mouth every 8 hours as needed for nausea      12/29/2023   Anil Mauricio MD White, Scott, MD  12/30/23 0117

## 2023-12-30 NOTE — ED NOTES
Writer heard and observed patient vomiting multiple times in the lobby. Writer offered an additional dose of zofran but patient stated it didn't really help so she does not want one. Writer advised that if she changes her mind while waiting in the lobby she should let any of the staff know.

## 2023-12-30 NOTE — ED TRIAGE NOTES
Presents to ED with c/o nausea and vomiting that started today. Patient also states that since the vomiting began she began having generalized abdominal and chest pain as well. Hx MI and has cardiac stents

## 2023-12-31 ENCOUNTER — HOSPITAL ENCOUNTER (EMERGENCY)
Facility: CLINIC | Age: 58
Discharge: HOME | End: 2023-12-31
Attending: EMERGENCY MEDICINE | Admitting: EMERGENCY MEDICINE
Payer: COMMERCIAL

## 2023-12-31 VITALS
HEART RATE: 67 BPM | WEIGHT: 265 LBS | SYSTOLIC BLOOD PRESSURE: 178 MMHG | OXYGEN SATURATION: 100 % | DIASTOLIC BLOOD PRESSURE: 111 MMHG | TEMPERATURE: 98.1 F | RESPIRATION RATE: 18 BRPM

## 2023-12-31 DIAGNOSIS — R11.2 NAUSEA AND VOMITING, UNSPECIFIED VOMITING TYPE: ICD-10-CM

## 2023-12-31 DIAGNOSIS — K85.90 ACUTE PANCREATITIS, UNSPECIFIED COMPLICATION STATUS, UNSPECIFIED PANCREATITIS TYPE: ICD-10-CM

## 2023-12-31 LAB
ALBUMIN SERPL BCG-MCNC: 4.3 G/DL (ref 3.5–5.2)
ALP SERPL-CCNC: 72 U/L (ref 40–150)
ALT SERPL W P-5'-P-CCNC: 26 U/L (ref 0–50)
ANION GAP SERPL CALCULATED.3IONS-SCNC: 17 MMOL/L (ref 7–15)
AST SERPL W P-5'-P-CCNC: 41 U/L (ref 0–45)
BASOPHILS # BLD AUTO: 0 10E3/UL (ref 0–0.2)
BASOPHILS NFR BLD AUTO: 0 %
BILIRUB DIRECT SERPL-MCNC: <0.2 MG/DL (ref 0–0.3)
BILIRUB SERPL-MCNC: 0.7 MG/DL
BUN SERPL-MCNC: 15.8 MG/DL (ref 6–20)
CALCIUM SERPL-MCNC: 9.2 MG/DL (ref 8.6–10)
CHLORIDE SERPL-SCNC: 100 MMOL/L (ref 98–107)
CREAT SERPL-MCNC: 0.83 MG/DL (ref 0.51–0.95)
DEPRECATED HCO3 PLAS-SCNC: 18 MMOL/L (ref 22–29)
EGFRCR SERPLBLD CKD-EPI 2021: 82 ML/MIN/1.73M2
EOSINOPHIL # BLD AUTO: 0 10E3/UL (ref 0–0.7)
EOSINOPHIL NFR BLD AUTO: 0 %
ERYTHROCYTE [DISTWIDTH] IN BLOOD BY AUTOMATED COUNT: 13 % (ref 10–15)
GLUCOSE SERPL-MCNC: 155 MG/DL (ref 70–99)
HCT VFR BLD AUTO: 42.3 % (ref 35–47)
HGB BLD-MCNC: 14.6 G/DL (ref 11.7–15.7)
IMM GRANULOCYTES # BLD: 0 10E3/UL
IMM GRANULOCYTES NFR BLD: 0 %
LIPASE SERPL-CCNC: 154 U/L (ref 13–60)
LYMPHOCYTES # BLD AUTO: 1.2 10E3/UL (ref 0.8–5.3)
LYMPHOCYTES NFR BLD AUTO: 16 %
MCH RBC QN AUTO: 32.9 PG (ref 26.5–33)
MCHC RBC AUTO-ENTMCNC: 34.5 G/DL (ref 31.5–36.5)
MCV RBC AUTO: 95 FL (ref 78–100)
MONOCYTES # BLD AUTO: 0.4 10E3/UL (ref 0–1.3)
MONOCYTES NFR BLD AUTO: 5 %
NEUTROPHILS # BLD AUTO: 6 10E3/UL (ref 1.6–8.3)
NEUTROPHILS NFR BLD AUTO: 79 %
NRBC # BLD AUTO: 0 10E3/UL
NRBC BLD AUTO-RTO: 0 /100
PLATELET # BLD AUTO: 262 10E3/UL (ref 150–450)
POTASSIUM SERPL-SCNC: 3.8 MMOL/L (ref 3.4–5.3)
PROT SERPL-MCNC: 7.8 G/DL (ref 6.4–8.3)
RBC # BLD AUTO: 4.44 10E6/UL (ref 3.8–5.2)
SODIUM SERPL-SCNC: 135 MMOL/L (ref 135–145)
TRIGL SERPL-MCNC: 122 MG/DL
WBC # BLD AUTO: 7.7 10E3/UL (ref 4–11)

## 2023-12-31 PROCEDURE — 83690 ASSAY OF LIPASE: CPT | Performed by: EMERGENCY MEDICINE

## 2023-12-31 PROCEDURE — 85025 COMPLETE CBC W/AUTO DIFF WBC: CPT | Performed by: EMERGENCY MEDICINE

## 2023-12-31 PROCEDURE — 96375 TX/PRO/DX INJ NEW DRUG ADDON: CPT

## 2023-12-31 PROCEDURE — 250N000013 HC RX MED GY IP 250 OP 250 PS 637: Performed by: EMERGENCY MEDICINE

## 2023-12-31 PROCEDURE — 99284 EMERGENCY DEPT VISIT MOD MDM: CPT | Mod: 25

## 2023-12-31 PROCEDURE — 36415 COLL VENOUS BLD VENIPUNCTURE: CPT | Performed by: EMERGENCY MEDICINE

## 2023-12-31 PROCEDURE — 258N000003 HC RX IP 258 OP 636: Performed by: EMERGENCY MEDICINE

## 2023-12-31 PROCEDURE — 250N000011 HC RX IP 250 OP 636: Performed by: EMERGENCY MEDICINE

## 2023-12-31 PROCEDURE — 84478 ASSAY OF TRIGLYCERIDES: CPT | Performed by: EMERGENCY MEDICINE

## 2023-12-31 PROCEDURE — 80048 BASIC METABOLIC PNL TOTAL CA: CPT | Performed by: EMERGENCY MEDICINE

## 2023-12-31 PROCEDURE — 96361 HYDRATE IV INFUSION ADD-ON: CPT

## 2023-12-31 PROCEDURE — 93005 ELECTROCARDIOGRAM TRACING: CPT

## 2023-12-31 PROCEDURE — 82248 BILIRUBIN DIRECT: CPT | Performed by: EMERGENCY MEDICINE

## 2023-12-31 PROCEDURE — 96374 THER/PROPH/DIAG INJ IV PUSH: CPT

## 2023-12-31 PROCEDURE — 96376 TX/PRO/DX INJ SAME DRUG ADON: CPT

## 2023-12-31 RX ORDER — DIPHENHYDRAMINE HYDROCHLORIDE 50 MG/ML
25 INJECTION INTRAMUSCULAR; INTRAVENOUS ONCE
Status: COMPLETED | OUTPATIENT
Start: 2023-12-31 | End: 2023-12-31

## 2023-12-31 RX ORDER — HYDROCODONE BITARTRATE AND ACETAMINOPHEN 5; 325 MG/1; MG/1
1 TABLET ORAL ONCE
Status: COMPLETED | OUTPATIENT
Start: 2023-12-31 | End: 2023-12-31

## 2023-12-31 RX ORDER — HYDROMORPHONE HCL IN WATER/PF 6 MG/30 ML
0.2 PATIENT CONTROLLED ANALGESIA SYRINGE INTRAVENOUS ONCE
Status: COMPLETED | OUTPATIENT
Start: 2023-12-31 | End: 2023-12-31

## 2023-12-31 RX ORDER — DEXAMETHASONE SODIUM PHOSPHATE 10 MG/ML
10 INJECTION, SOLUTION INTRAMUSCULAR; INTRAVENOUS ONCE
Status: DISCONTINUED | OUTPATIENT
Start: 2023-12-31 | End: 2023-12-31

## 2023-12-31 RX ORDER — METOCLOPRAMIDE HYDROCHLORIDE 5 MG/ML
10 INJECTION INTRAMUSCULAR; INTRAVENOUS ONCE
Status: COMPLETED | OUTPATIENT
Start: 2023-12-31 | End: 2023-12-31

## 2023-12-31 RX ORDER — METOCLOPRAMIDE 5 MG/1
5 TABLET ORAL 3 TIMES DAILY PRN
Qty: 15 TABLET | Refills: 0 | Status: SHIPPED | OUTPATIENT
Start: 2023-12-31

## 2023-12-31 RX ORDER — METOCLOPRAMIDE HYDROCHLORIDE 5 MG/ML
5 INJECTION INTRAMUSCULAR; INTRAVENOUS ONCE
Status: COMPLETED | OUTPATIENT
Start: 2023-12-31 | End: 2023-12-31

## 2023-12-31 RX ORDER — HYDROCODONE BITARTRATE AND ACETAMINOPHEN 5; 325 MG/1; MG/1
1 TABLET ORAL EVERY 6 HOURS PRN
Qty: 10 TABLET | Refills: 0 | Status: ON HOLD | OUTPATIENT
Start: 2023-12-31 | End: 2024-01-05

## 2023-12-31 RX ORDER — OLANZAPINE 10 MG/1
5 INJECTION, POWDER, LYOPHILIZED, FOR SOLUTION INTRAMUSCULAR ONCE
Status: COMPLETED | OUTPATIENT
Start: 2023-12-31 | End: 2023-12-31

## 2023-12-31 RX ORDER — KETOROLAC TROMETHAMINE 15 MG/ML
15 INJECTION, SOLUTION INTRAMUSCULAR; INTRAVENOUS ONCE
Status: COMPLETED | OUTPATIENT
Start: 2023-12-31 | End: 2023-12-31

## 2023-12-31 RX ADMIN — HYDROMORPHONE HYDROCHLORIDE 0.2 MG: 0.2 INJECTION, SOLUTION INTRAMUSCULAR; INTRAVENOUS; SUBCUTANEOUS at 15:48

## 2023-12-31 RX ADMIN — METOCLOPRAMIDE HYDROCHLORIDE 5 MG: 5 INJECTION INTRAMUSCULAR; INTRAVENOUS at 16:16

## 2023-12-31 RX ADMIN — SODIUM CHLORIDE 1000 ML: 9 INJECTION, SOLUTION INTRAVENOUS at 10:30

## 2023-12-31 RX ADMIN — HYDROCODONE BITARTRATE AND ACETAMINOPHEN 1 TABLET: 5; 325 TABLET ORAL at 17:36

## 2023-12-31 RX ADMIN — OLANZAPINE 5 MG: 10 INJECTION, POWDER, FOR SOLUTION INTRAMUSCULAR at 12:50

## 2023-12-31 RX ADMIN — METOCLOPRAMIDE HYDROCHLORIDE 10 MG: 5 INJECTION INTRAMUSCULAR; INTRAVENOUS at 10:30

## 2023-12-31 RX ADMIN — DIPHENHYDRAMINE HYDROCHLORIDE 25 MG: 50 INJECTION, SOLUTION INTRAMUSCULAR; INTRAVENOUS at 10:31

## 2023-12-31 RX ADMIN — KETOROLAC TROMETHAMINE 15 MG: 15 INJECTION, SOLUTION INTRAMUSCULAR; INTRAVENOUS at 12:46

## 2023-12-31 ASSESSMENT — ACTIVITIES OF DAILY LIVING (ADL)
ADLS_ACUITY_SCORE: 35

## 2023-12-31 NOTE — ED TRIAGE NOTES
"Nausea and vomiting since Thursday evening. Complete ED workup 12/29; negative. Abdominal pain and emesis since this morning. Pt reports zofran taken ; pt reports zofran \"made it so much worse.\" ABC in tact. A/OX4    Pt admits to frequent THC use   Triage Assessment (Adult)       Row Name 12/31/23 0944          Triage Assessment    Airway WDL WDL        Respiratory WDL    Respiratory WDL WDL        Skin Circulation/Temperature WDL    Skin Circulation/Temperature WDL WDL        Cardiac WDL    Cardiac WDL WDL        Peripheral/Neurovascular WDL    Peripheral Neurovascular WDL WDL        Cognitive/Neuro/Behavioral WDL    Cognitive/Neuro/Behavioral WDL WDL                     "

## 2023-12-31 NOTE — ED NOTES
Passed PO challenge. Is able to keep water down. Complaining of nausea and stomach cramping. Is not noted to be dry heaving anymore.

## 2023-12-31 NOTE — ED PROVIDER NOTES
History     Chief Complaint:  Nausea & Vomiting       The history is provided by the patient.      Radha James is a 58 year old female with a history of hyperlipidemia, coronary artery disease, and NSTEMI s/p stent placement who presents with nausea, vomiting, and abdominal pain, beginning at 7 am today. She was seen at urgent care today and was referred here. Patient was seen 12/29/23 in the emergency department for the same symptoms, but was doing well yesterday following discharge. Reports she took Zofran shortly after symptoms began, and nausea seemed to increased dramatically afterwards. She later took Benadryl and Reglan, which seemed to help. She reports cold sweats today. Denies recorded fevers, blood in vomit, and diarrhea. She uses marijuana daily, using a pen 6-7 times a day. Denies marijuana use today. Denies history of abdominal surgery.    Independent Historian:   None - Patient Only    Review of External Notes:   Reviewed urgent care note from today, as well as ED visit 12/29/2023.    Patient has CT scan chest abdomen pelvis that was negative for PE, no evidence for gallstones, pancreatitis, bowel obstruction at that time.    Medications:    Medical cannabis  Zyprexa  Atenolol  Lipitor    Past Medical History:    Depression   Psychosis  Prediabetes  Hyperlipidemia   NSTEMI  Bipolar 1 disorder   Coronary artery disease   Tobacco use disorder   Kidney stones    Past Surgical History:    [place] teeth extraction   Coronary stent placement    Physical Exam   Patient Vitals for the past 24 hrs:   BP Temp Temp src Pulse Resp SpO2 Weight   12/31/23 0948 (!) 177/131 -- -- -- -- -- --   12/31/23 0946 -- 98.1  F (36.7  C) Temporal 64 18 99 % 120.2 kg (265 lb)        Physical Exam  General: Seated at the side of the bed, bent over, complaining of nausea.  HENT: mucous membranes moist  CV: regular rate, regular rhythm  Resp: normal effort, clear throughout, no crackles or wheezing  GI: abdomen soft,  diffuse abdominal tenderness no rebound or guarding.  MSK: no bony tenderness  Skin: appropriately warm and dry  Extremities: no edema, calves non-tender  Neuro: alert, clear speech, oriented  Psych: normal mood and affect    Emergency Department Course   ECG:  ECG results from 12/29/23   EKG 12-lead, tracing only     Value    Systolic Blood Pressure     Diastolic Blood Pressure     Ventricular Rate 64    Atrial Rate 64    VA Interval 170    QRS Duration 84        QTc 476    P Axis 62    R AXIS 60    T Axis 62    Interpretation ECG      Sinus rhythm  Normal ECG  No previous ECGs available       Laboratory:  Labs Ordered and Resulted from Time of ED Arrival to Time of ED Departure   BASIC METABOLIC PANEL - Abnormal       Result Value    Sodium 135      Potassium 3.8      Chloride 100      Carbon Dioxide (CO2) 18 (*)     Anion Gap 17 (*)     Urea Nitrogen 15.8      Creatinine 0.83      GFR Estimate 82      Calcium 9.2      Glucose 155 (*)    CBC WITH PLATELETS AND DIFFERENTIAL    WBC Count 7.7      RBC Count 4.44      Hemoglobin 14.6      Hematocrit 42.3      MCV 95      MCH 32.9      MCHC 34.5      RDW 13.0      Platelet Count 262      % Neutrophils 79      % Lymphocytes 16      % Monocytes 5      % Eosinophils 0      % Basophils 0      % Immature Granulocytes 0      NRBCs per 100 WBC 0      Absolute Neutrophils 6.0      Absolute Lymphocytes 1.2      Absolute Monocytes 0.4      Absolute Eosinophils 0.0      Absolute Basophils 0.0      Absolute Immature Granulocytes 0.0      Absolute NRBCs 0.0        Emergency Department Course & Assessments:       Interventions:  Medications   sodium chloride 0.9% BOLUS 1,000 mL (1,000 mLs Intravenous $New Bag 12/31/23 1030)   metoclopramide (REGLAN) injection 10 mg (10 mg Intravenous $Given 12/31/23 1030)   diphenhydrAMINE (BENADRYL) injection 25 mg (25 mg Intravenous $Given 12/31/23 1031)        Independent Interpretation (X-rays, CTs, rhythm  strip):  None    Assessments/Consultations/Discussion of Management or Tests:  ED Course as of 12/31/23 1744   Sun Dec 31, 2023   1226 I obtained the history and examined the patient as noted above.    1525 I rechecked the patient.  She states that she still feels very nauseated, has a lot of pain.       Social Determinants of Health affecting care:   None    Disposition:  The patient was discharged to home.     Impression & Plan    CMS Diagnoses: None    Medical Decision Making:  Radha James is a 58 year old female without other significant medical history or surgical history, presenting with recurrent diffuse abdominal pain, nausea and vomiting.  On exam, the patient is slightly hypertensive, but afebrile.  She has mild diffuse abdominal tenderness, but no signs of peritonitis.  She had extensive workup in the ED 2 days ago, that was unrevealing.  This included CT chest, abdomen, pelvis.  I did not repeat additional imaging today.  Labs today demonstrate mild anion gap acidosis, but normal electrolytes.  LFTs are normal, but she does have slightly bumped lipase.  Overall, presentation consistent with mild pancreatitis.  I did not repeat imaging, given that this was just performed 2 days ago.  This was negative for gallstones, the patient had no signs of inflamed pancreas at that time.  She denies alcohol use.  Triglycerides are pending.  She is not currently on any medications that would cause pancreatitis.  At this point, pain is manageable, and she is able to tolerate oral fluids.  We discussed clear liquids, and bowel rest for the next 24 hours.  Short course of narcotics and Reglan was provided.  Patient did not get much relief from Zofran.  Return to the ED for persistent symptoms, or inability to tolerate oral fluids.  Otherwise, I recommended outpatient follow-up to discuss additional workup for pancreatitis as needed.      Diagnosis:    ICD-10-CM    1. Nausea and vomiting, unspecified vomiting type   R11.2       2. Acute pancreatitis, unspecified complication status, unspecified pancreatitis type  K85.90            Discharge Medications:  Discharge Medication List as of 12/31/2023  5:31 PM        START taking these medications    Details   HYDROcodone-acetaminophen (NORCO) 5-325 MG tablet Take 1 tablet by mouth every 6 hours as needed for severe pain, Disp-10 tablet, R-0, Local Print      metoclopramide (REGLAN) 5 MG tablet Take 1 tablet (5 mg) by mouth 3 times daily as needed, Disp-15 tablet, R-0, Local Print              Scribe Disclosure:  I, Viji Calvin, am serving as a scribe at 12:19 PM on 12/31/2023 to document services personally performed by Jdoi Soriano MD based on my observations and the provider's statements to me.     12/31/2023   Jodi Soriano MD Pepper, Tracy Lynn, MD  12/31/23 1947

## 2024-01-02 ENCOUNTER — APPOINTMENT (OUTPATIENT)
Dept: CT IMAGING | Facility: CLINIC | Age: 59
DRG: 392 | End: 2024-01-02
Attending: EMERGENCY MEDICINE
Payer: COMMERCIAL

## 2024-01-02 ENCOUNTER — APPOINTMENT (OUTPATIENT)
Dept: ULTRASOUND IMAGING | Facility: CLINIC | Age: 59
DRG: 392 | End: 2024-01-02
Attending: EMERGENCY MEDICINE
Payer: COMMERCIAL

## 2024-01-02 ENCOUNTER — HOSPITAL ENCOUNTER (INPATIENT)
Facility: CLINIC | Age: 59
LOS: 1 days | Discharge: HOME | DRG: 392 | End: 2024-01-05
Attending: EMERGENCY MEDICINE | Admitting: HOSPITALIST
Payer: COMMERCIAL

## 2024-01-02 DIAGNOSIS — K29.70 GASTRITIS WITHOUT BLEEDING, UNSPECIFIED CHRONICITY, UNSPECIFIED GASTRITIS TYPE: ICD-10-CM

## 2024-01-02 DIAGNOSIS — R10.13 ABDOMINAL PAIN, EPIGASTRIC: Primary | ICD-10-CM

## 2024-01-02 DIAGNOSIS — K59.00 CONSTIPATION, UNSPECIFIED CONSTIPATION TYPE: ICD-10-CM

## 2024-01-02 DIAGNOSIS — R11.10 INTRACTABLE VOMITING: ICD-10-CM

## 2024-01-02 DIAGNOSIS — K82.8 BILIARY DYSKINESIA: ICD-10-CM

## 2024-01-02 DIAGNOSIS — K21.00 GASTROESOPHAGEAL REFLUX DISEASE WITH ESOPHAGITIS, UNSPECIFIED WHETHER HEMORRHAGE: ICD-10-CM

## 2024-01-02 LAB
ALBUMIN SERPL BCG-MCNC: 4.7 G/DL (ref 3.5–5.2)
ALP SERPL-CCNC: 79 U/L (ref 40–150)
ALT SERPL W P-5'-P-CCNC: 28 U/L (ref 0–50)
ANION GAP SERPL CALCULATED.3IONS-SCNC: 15 MMOL/L (ref 7–15)
AST SERPL W P-5'-P-CCNC: 32 U/L (ref 0–45)
ATRIAL RATE - MUSE: 64 BPM
ATRIAL RATE - MUSE: 64 BPM
BASOPHILS # BLD AUTO: 0 10E3/UL (ref 0–0.2)
BASOPHILS NFR BLD AUTO: 1 %
BILIRUB DIRECT SERPL-MCNC: 0.23 MG/DL (ref 0–0.3)
BILIRUB SERPL-MCNC: 1.1 MG/DL
BUN SERPL-MCNC: 9.9 MG/DL (ref 6–20)
CALCIUM SERPL-MCNC: 9.6 MG/DL (ref 8.6–10)
CHLORIDE SERPL-SCNC: 98 MMOL/L (ref 98–107)
CREAT SERPL-MCNC: 0.82 MG/DL (ref 0.51–0.95)
D DIMER PPP FEU-MCNC: 0.37 UG/ML FEU (ref 0–0.5)
DEPRECATED HCO3 PLAS-SCNC: 22 MMOL/L (ref 22–29)
DIASTOLIC BLOOD PRESSURE - MUSE: NORMAL MMHG
DIASTOLIC BLOOD PRESSURE - MUSE: NORMAL MMHG
EGFRCR SERPLBLD CKD-EPI 2021: 83 ML/MIN/1.73M2
EOSINOPHIL # BLD AUTO: 0 10E3/UL (ref 0–0.7)
EOSINOPHIL NFR BLD AUTO: 1 %
ERYTHROCYTE [DISTWIDTH] IN BLOOD BY AUTOMATED COUNT: 12.8 % (ref 10–15)
GLUCOSE SERPL-MCNC: 139 MG/DL (ref 70–99)
HCT VFR BLD AUTO: 44.6 % (ref 35–47)
HGB BLD-MCNC: 15.7 G/DL (ref 11.7–15.7)
IMM GRANULOCYTES # BLD: 0 10E3/UL
IMM GRANULOCYTES NFR BLD: 1 %
INTERPRETATION ECG - MUSE: NORMAL
INTERPRETATION ECG - MUSE: NORMAL
LIPASE SERPL-CCNC: 20 U/L (ref 13–60)
LYMPHOCYTES # BLD AUTO: 0.9 10E3/UL (ref 0.8–5.3)
LYMPHOCYTES NFR BLD AUTO: 11 %
MCH RBC QN AUTO: 32.9 PG (ref 26.5–33)
MCHC RBC AUTO-ENTMCNC: 35.2 G/DL (ref 31.5–36.5)
MCV RBC AUTO: 94 FL (ref 78–100)
MONOCYTES # BLD AUTO: 0.3 10E3/UL (ref 0–1.3)
MONOCYTES NFR BLD AUTO: 4 %
NEUTROPHILS # BLD AUTO: 6.8 10E3/UL (ref 1.6–8.3)
NEUTROPHILS NFR BLD AUTO: 82 %
NRBC # BLD AUTO: 0 10E3/UL
NRBC BLD AUTO-RTO: 0 /100
P AXIS - MUSE: 62 DEGREES
P AXIS - MUSE: 67 DEGREES
PLATELET # BLD AUTO: 285 10E3/UL (ref 150–450)
POTASSIUM SERPL-SCNC: 3.8 MMOL/L (ref 3.4–5.3)
PR INTERVAL - MUSE: 156 MS
PR INTERVAL - MUSE: 170 MS
PROT SERPL-MCNC: 8.3 G/DL (ref 6.4–8.3)
QRS DURATION - MUSE: 70 MS
QRS DURATION - MUSE: 84 MS
QT - MUSE: 454 MS
QT - MUSE: 462 MS
QTC - MUSE: 468 MS
QTC - MUSE: 476 MS
R AXIS - MUSE: 60 DEGREES
R AXIS - MUSE: 63 DEGREES
RBC # BLD AUTO: 4.77 10E6/UL (ref 3.8–5.2)
SODIUM SERPL-SCNC: 135 MMOL/L (ref 135–145)
SYSTOLIC BLOOD PRESSURE - MUSE: NORMAL MMHG
SYSTOLIC BLOOD PRESSURE - MUSE: NORMAL MMHG
T AXIS - MUSE: 62 DEGREES
T AXIS - MUSE: 62 DEGREES
TROPONIN T SERPL HS-MCNC: 7 NG/L
VENTRICULAR RATE- MUSE: 64 BPM
VENTRICULAR RATE- MUSE: 64 BPM
WBC # BLD AUTO: 8.1 10E3/UL (ref 4–11)

## 2024-01-02 PROCEDURE — 74177 CT ABD & PELVIS W/CONTRAST: CPT

## 2024-01-02 PROCEDURE — 96375 TX/PRO/DX INJ NEW DRUG ADDON: CPT

## 2024-01-02 PROCEDURE — 258N000003 HC RX IP 258 OP 636: Performed by: EMERGENCY MEDICINE

## 2024-01-02 PROCEDURE — 250N000011 HC RX IP 250 OP 636: Performed by: EMERGENCY MEDICINE

## 2024-01-02 PROCEDURE — 96361 HYDRATE IV INFUSION ADD-ON: CPT

## 2024-01-02 PROCEDURE — 76705 ECHO EXAM OF ABDOMEN: CPT

## 2024-01-02 PROCEDURE — C9113 INJ PANTOPRAZOLE SODIUM, VIA: HCPCS | Performed by: EMERGENCY MEDICINE

## 2024-01-02 PROCEDURE — 93005 ELECTROCARDIOGRAM TRACING: CPT

## 2024-01-02 PROCEDURE — 85025 COMPLETE CBC W/AUTO DIFF WBC: CPT | Performed by: EMERGENCY MEDICINE

## 2024-01-02 PROCEDURE — 82248 BILIRUBIN DIRECT: CPT | Performed by: EMERGENCY MEDICINE

## 2024-01-02 PROCEDURE — 83690 ASSAY OF LIPASE: CPT | Performed by: EMERGENCY MEDICINE

## 2024-01-02 PROCEDURE — 84484 ASSAY OF TROPONIN QUANT: CPT | Performed by: EMERGENCY MEDICINE

## 2024-01-02 PROCEDURE — 99222 1ST HOSP IP/OBS MODERATE 55: CPT | Performed by: INTERNAL MEDICINE

## 2024-01-02 PROCEDURE — G0378 HOSPITAL OBSERVATION PER HR: HCPCS

## 2024-01-02 PROCEDURE — 96374 THER/PROPH/DIAG INJ IV PUSH: CPT

## 2024-01-02 PROCEDURE — 85379 FIBRIN DEGRADATION QUANT: CPT | Performed by: EMERGENCY MEDICINE

## 2024-01-02 PROCEDURE — 250N000013 HC RX MED GY IP 250 OP 250 PS 637: Performed by: EMERGENCY MEDICINE

## 2024-01-02 PROCEDURE — 36415 COLL VENOUS BLD VENIPUNCTURE: CPT | Performed by: EMERGENCY MEDICINE

## 2024-01-02 PROCEDURE — 80048 BASIC METABOLIC PNL TOTAL CA: CPT | Performed by: EMERGENCY MEDICINE

## 2024-01-02 PROCEDURE — 99285 EMERGENCY DEPT VISIT HI MDM: CPT | Mod: 25

## 2024-01-02 RX ORDER — ONDANSETRON 2 MG/ML
4 INJECTION INTRAMUSCULAR; INTRAVENOUS ONCE
Status: COMPLETED | OUTPATIENT
Start: 2024-01-02 | End: 2024-01-02

## 2024-01-02 RX ORDER — OXYCODONE HYDROCHLORIDE 5 MG/1
5 TABLET ORAL ONCE
Status: COMPLETED | OUTPATIENT
Start: 2024-01-02 | End: 2024-01-02

## 2024-01-02 RX ORDER — IOPAMIDOL 755 MG/ML
500 INJECTION, SOLUTION INTRAVASCULAR ONCE
Status: COMPLETED | OUTPATIENT
Start: 2024-01-02 | End: 2024-01-02

## 2024-01-02 RX ORDER — DIPHENHYDRAMINE HYDROCHLORIDE 50 MG/ML
12.5 INJECTION INTRAMUSCULAR; INTRAVENOUS ONCE
Status: COMPLETED | OUTPATIENT
Start: 2024-01-02 | End: 2024-01-02

## 2024-01-02 RX ADMIN — DIPHENHYDRAMINE HYDROCHLORIDE 12.5 MG: 50 INJECTION, SOLUTION INTRAMUSCULAR; INTRAVENOUS at 19:47

## 2024-01-02 RX ADMIN — IOPAMIDOL 100 ML: 755 INJECTION, SOLUTION INTRAVENOUS at 20:41

## 2024-01-02 RX ADMIN — OXYCODONE HYDROCHLORIDE 5 MG: 5 TABLET ORAL at 19:47

## 2024-01-02 RX ADMIN — PROCHLORPERAZINE EDISYLATE 10 MG: 5 INJECTION INTRAMUSCULAR; INTRAVENOUS at 19:47

## 2024-01-02 RX ADMIN — PANTOPRAZOLE SODIUM 40 MG: 40 INJECTION, POWDER, FOR SOLUTION INTRAVENOUS at 19:47

## 2024-01-02 RX ADMIN — SODIUM CHLORIDE 1000 ML: 9 INJECTION, SOLUTION INTRAVENOUS at 19:51

## 2024-01-02 ASSESSMENT — ACTIVITIES OF DAILY LIVING (ADL)
ADLS_ACUITY_SCORE: 35
ADLS_ACUITY_SCORE: 35
ADLS_ACUITY_SCORE: 33

## 2024-01-02 NOTE — ED TRIAGE NOTES
Pt arrives with nausea and chest pain that started on Sunday. Pt was recently seen on Sunday for similar episode, pt left feeling better but symptoms have returned. ABCS intact and Aox4.      Triage Assessment (Adult)       Row Name 01/02/24 1426          Triage Assessment    Airway WDL WDL        Respiratory WDL    Respiratory WDL WDL        Cardiac WDL    Cardiac WDL WDL

## 2024-01-03 ENCOUNTER — APPOINTMENT (OUTPATIENT)
Dept: NUCLEAR MEDICINE | Facility: CLINIC | Age: 59
DRG: 392 | End: 2024-01-03
Attending: INTERNAL MEDICINE
Payer: COMMERCIAL

## 2024-01-03 LAB
ALBUMIN SERPL BCG-MCNC: 3.8 G/DL (ref 3.5–5.2)
ALP SERPL-CCNC: 59 U/L (ref 40–150)
ALT SERPL W P-5'-P-CCNC: 24 U/L (ref 0–50)
ANION GAP SERPL CALCULATED.3IONS-SCNC: 12 MMOL/L (ref 7–15)
AST SERPL W P-5'-P-CCNC: 27 U/L (ref 0–45)
BILIRUB SERPL-MCNC: 1 MG/DL
BUN SERPL-MCNC: 8.6 MG/DL (ref 6–20)
CALCIUM SERPL-MCNC: 8.4 MG/DL (ref 8.6–10)
CHLORIDE SERPL-SCNC: 105 MMOL/L (ref 98–107)
CREAT SERPL-MCNC: 0.77 MG/DL (ref 0.51–0.95)
DEPRECATED HCO3 PLAS-SCNC: 22 MMOL/L (ref 22–29)
EGFRCR SERPLBLD CKD-EPI 2021: 90 ML/MIN/1.73M2
ERYTHROCYTE [DISTWIDTH] IN BLOOD BY AUTOMATED COUNT: 13.4 % (ref 10–15)
GLUCOSE BLDC GLUCOMTR-MCNC: 111 MG/DL (ref 70–99)
GLUCOSE SERPL-MCNC: 95 MG/DL (ref 70–99)
HCT VFR BLD AUTO: 39.1 % (ref 35–47)
HGB BLD-MCNC: 13.5 G/DL (ref 11.7–15.7)
LIPASE SERPL-CCNC: 24 U/L (ref 13–60)
MCH RBC QN AUTO: 32.8 PG (ref 26.5–33)
MCHC RBC AUTO-ENTMCNC: 34.5 G/DL (ref 31.5–36.5)
MCV RBC AUTO: 95 FL (ref 78–100)
PLATELET # BLD AUTO: 227 10E3/UL (ref 150–450)
POTASSIUM SERPL-SCNC: 3.4 MMOL/L (ref 3.4–5.3)
PROT SERPL-MCNC: 6.6 G/DL (ref 6.4–8.3)
RBC # BLD AUTO: 4.11 10E6/UL (ref 3.8–5.2)
SODIUM SERPL-SCNC: 139 MMOL/L (ref 135–145)
UPPER GI ENDOSCOPY: NORMAL
WBC # BLD AUTO: 7.2 10E3/UL (ref 4–11)

## 2024-01-03 PROCEDURE — 250N000011 HC RX IP 250 OP 636: Performed by: INTERNAL MEDICINE

## 2024-01-03 PROCEDURE — 80053 COMPREHEN METABOLIC PANEL: CPT | Performed by: INTERNAL MEDICINE

## 2024-01-03 PROCEDURE — 343N000001 HC RX 343: Performed by: EMERGENCY MEDICINE

## 2024-01-03 PROCEDURE — G0378 HOSPITAL OBSERVATION PER HR: HCPCS

## 2024-01-03 PROCEDURE — 36415 COLL VENOUS BLD VENIPUNCTURE: CPT | Performed by: INTERNAL MEDICINE

## 2024-01-03 PROCEDURE — 82962 GLUCOSE BLOOD TEST: CPT

## 2024-01-03 PROCEDURE — 999N000099 HC STATISTIC MODERATE SEDATION < 10 MIN: Performed by: INTERNAL MEDICINE

## 2024-01-03 PROCEDURE — 43239 EGD BIOPSY SINGLE/MULTIPLE: CPT | Performed by: INTERNAL MEDICINE

## 2024-01-03 PROCEDURE — 83690 ASSAY OF LIPASE: CPT | Performed by: INTERNAL MEDICINE

## 2024-01-03 PROCEDURE — 78227 HEPATOBIL SYST IMAGE W/DRUG: CPT

## 2024-01-03 PROCEDURE — 250N000013 HC RX MED GY IP 250 OP 250 PS 637: Performed by: INTERNAL MEDICINE

## 2024-01-03 PROCEDURE — 258N000003 HC RX IP 258 OP 636: Performed by: INTERNAL MEDICINE

## 2024-01-03 PROCEDURE — A9537 TC99M MEBROFENIN: HCPCS | Performed by: EMERGENCY MEDICINE

## 2024-01-03 PROCEDURE — 96361 HYDRATE IV INFUSION ADD-ON: CPT

## 2024-01-03 PROCEDURE — 88305 TISSUE EXAM BY PATHOLOGIST: CPT | Mod: TC | Performed by: INTERNAL MEDICINE

## 2024-01-03 PROCEDURE — 250N000011 HC RX IP 250 OP 636: Performed by: EMERGENCY MEDICINE

## 2024-01-03 PROCEDURE — 250N000013 HC RX MED GY IP 250 OP 250 PS 637: Performed by: HOSPITALIST

## 2024-01-03 PROCEDURE — 0DB68ZX EXCISION OF STOMACH, VIA NATURAL OR ARTIFICIAL OPENING ENDOSCOPIC, DIAGNOSTIC: ICD-10-PCS | Performed by: INTERNAL MEDICINE

## 2024-01-03 PROCEDURE — 85018 HEMOGLOBIN: CPT | Performed by: INTERNAL MEDICINE

## 2024-01-03 PROCEDURE — 250N000009 HC RX 250: Performed by: INTERNAL MEDICINE

## 2024-01-03 PROCEDURE — 99232 SBSQ HOSP IP/OBS MODERATE 35: CPT | Performed by: HOSPITALIST

## 2024-01-03 RX ORDER — PROCHLORPERAZINE 25 MG
25 SUPPOSITORY, RECTAL RECTAL EVERY 12 HOURS PRN
Status: DISCONTINUED | OUTPATIENT
Start: 2024-01-03 | End: 2024-01-03

## 2024-01-03 RX ORDER — ATENOLOL 25 MG/1
25 TABLET ORAL DAILY
Status: DISCONTINUED | OUTPATIENT
Start: 2024-01-03 | End: 2024-01-05 | Stop reason: HOSPADM

## 2024-01-03 RX ORDER — ACETAMINOPHEN 325 MG/1
650 TABLET ORAL EVERY 4 HOURS PRN
Status: DISCONTINUED | OUTPATIENT
Start: 2024-01-03 | End: 2024-01-05 | Stop reason: HOSPADM

## 2024-01-03 RX ORDER — ATENOLOL 25 MG/1
25 TABLET ORAL DAILY
COMMUNITY

## 2024-01-03 RX ORDER — LIDOCAINE 40 MG/G
CREAM TOPICAL
Status: DISCONTINUED | OUTPATIENT
Start: 2024-01-03 | End: 2024-01-03 | Stop reason: HOSPADM

## 2024-01-03 RX ORDER — SODIUM CHLORIDE 9 MG/ML
INJECTION, SOLUTION INTRAVENOUS CONTINUOUS
Status: DISCONTINUED | OUTPATIENT
Start: 2024-01-03 | End: 2024-01-04

## 2024-01-03 RX ORDER — DIPHENHYDRAMINE HCL 25 MG
25 CAPSULE ORAL 3 TIMES DAILY PRN
COMMUNITY

## 2024-01-03 RX ORDER — FENTANYL CITRATE 50 UG/ML
50-100 INJECTION, SOLUTION INTRAMUSCULAR; INTRAVENOUS EVERY 5 MIN PRN
Status: DISCONTINUED | OUTPATIENT
Start: 2024-01-03 | End: 2024-01-03 | Stop reason: HOSPADM

## 2024-01-03 RX ORDER — OXYCODONE HYDROCHLORIDE 5 MG/1
5 TABLET ORAL EVERY 4 HOURS PRN
Status: DISCONTINUED | OUTPATIENT
Start: 2024-01-03 | End: 2024-01-04

## 2024-01-03 RX ORDER — NALOXONE HYDROCHLORIDE 0.4 MG/ML
0.2 INJECTION, SOLUTION INTRAMUSCULAR; INTRAVENOUS; SUBCUTANEOUS
Status: DISCONTINUED | OUTPATIENT
Start: 2024-01-03 | End: 2024-01-03 | Stop reason: HOSPADM

## 2024-01-03 RX ORDER — PROCHLORPERAZINE MALEATE 5 MG
5 TABLET ORAL EVERY 6 HOURS PRN
Status: DISCONTINUED | OUTPATIENT
Start: 2024-01-03 | End: 2024-01-03

## 2024-01-03 RX ORDER — NALOXONE HYDROCHLORIDE 0.4 MG/ML
0.4 INJECTION, SOLUTION INTRAMUSCULAR; INTRAVENOUS; SUBCUTANEOUS
Status: DISCONTINUED | OUTPATIENT
Start: 2024-01-03 | End: 2024-01-03 | Stop reason: HOSPADM

## 2024-01-03 RX ORDER — ATORVASTATIN CALCIUM 40 MG/1
40 TABLET, FILM COATED ORAL DAILY
COMMUNITY

## 2024-01-03 RX ORDER — ASPIRIN 81 MG/1
81 TABLET ORAL DAILY
COMMUNITY

## 2024-01-03 RX ORDER — SIMETHICONE 40MG/0.6ML
133 SUSPENSION, DROPS(FINAL DOSAGE FORM)(ML) ORAL
Status: DISCONTINUED | OUTPATIENT
Start: 2024-01-03 | End: 2024-01-03 | Stop reason: HOSPADM

## 2024-01-03 RX ORDER — PANTOPRAZOLE SODIUM 40 MG/1
40 TABLET, DELAYED RELEASE ORAL
Status: DISCONTINUED | OUTPATIENT
Start: 2024-01-03 | End: 2024-01-05 | Stop reason: HOSPADM

## 2024-01-03 RX ORDER — ATROPINE SULFATE 0.1 MG/ML
1 INJECTION INTRAVENOUS
Status: DISCONTINUED | OUTPATIENT
Start: 2024-01-03 | End: 2024-01-03 | Stop reason: HOSPADM

## 2024-01-03 RX ORDER — AMOXICILLIN 250 MG
1 CAPSULE ORAL 2 TIMES DAILY PRN
Status: DISCONTINUED | OUTPATIENT
Start: 2024-01-03 | End: 2024-01-05 | Stop reason: HOSPADM

## 2024-01-03 RX ORDER — KIT FOR THE PREPARATION OF TECHNETIUM TC 99M MEBROFENIN 45 MG/10ML
6 INJECTION, POWDER, LYOPHILIZED, FOR SOLUTION INTRAVENOUS ONCE
Status: COMPLETED | OUTPATIENT
Start: 2024-01-03 | End: 2024-01-03

## 2024-01-03 RX ORDER — METOCLOPRAMIDE HYDROCHLORIDE 5 MG/ML
5 INJECTION INTRAMUSCULAR; INTRAVENOUS EVERY 6 HOURS PRN
Status: DISCONTINUED | OUTPATIENT
Start: 2024-01-03 | End: 2024-01-05 | Stop reason: HOSPADM

## 2024-01-03 RX ORDER — ONDANSETRON 4 MG/1
4 TABLET, ORALLY DISINTEGRATING ORAL EVERY 6 HOURS PRN
Status: DISCONTINUED | OUTPATIENT
Start: 2024-01-03 | End: 2024-01-04

## 2024-01-03 RX ORDER — FLUMAZENIL 0.1 MG/ML
0.2 INJECTION, SOLUTION INTRAVENOUS
Status: DISCONTINUED | OUTPATIENT
Start: 2024-01-03 | End: 2024-01-03 | Stop reason: HOSPADM

## 2024-01-03 RX ORDER — EPINEPHRINE 1 MG/ML
0.1 INJECTION, SOLUTION INTRAMUSCULAR; SUBCUTANEOUS
Status: DISCONTINUED | OUTPATIENT
Start: 2024-01-03 | End: 2024-01-03 | Stop reason: HOSPADM

## 2024-01-03 RX ORDER — DIPHENHYDRAMINE HYDROCHLORIDE 50 MG/ML
25-50 INJECTION INTRAMUSCULAR; INTRAVENOUS
Status: DISCONTINUED | OUTPATIENT
Start: 2024-01-03 | End: 2024-01-03 | Stop reason: HOSPADM

## 2024-01-03 RX ORDER — ASPIRIN 81 MG/1
81 TABLET ORAL DAILY
Status: DISCONTINUED | OUTPATIENT
Start: 2024-01-04 | End: 2024-01-05 | Stop reason: HOSPADM

## 2024-01-03 RX ORDER — PANTOPRAZOLE SODIUM 40 MG/1
40 TABLET, DELAYED RELEASE ORAL
Status: DISCONTINUED | OUTPATIENT
Start: 2024-01-03 | End: 2024-01-03

## 2024-01-03 RX ORDER — FLUMAZENIL 0.1 MG/ML
0.2 INJECTION, SOLUTION INTRAVENOUS
Status: ACTIVE | OUTPATIENT
Start: 2024-01-03 | End: 2024-01-04

## 2024-01-03 RX ORDER — AMOXICILLIN 250 MG
2 CAPSULE ORAL 2 TIMES DAILY PRN
Status: DISCONTINUED | OUTPATIENT
Start: 2024-01-03 | End: 2024-01-05 | Stop reason: HOSPADM

## 2024-01-03 RX ORDER — ACETAMINOPHEN 650 MG/1
650 SUPPOSITORY RECTAL EVERY 4 HOURS PRN
Status: DISCONTINUED | OUTPATIENT
Start: 2024-01-03 | End: 2024-01-05 | Stop reason: HOSPADM

## 2024-01-03 RX ORDER — ONDANSETRON 2 MG/ML
4 INJECTION INTRAMUSCULAR; INTRAVENOUS EVERY 6 HOURS PRN
Status: DISCONTINUED | OUTPATIENT
Start: 2024-01-03 | End: 2024-01-04

## 2024-01-03 RX ORDER — ATORVASTATIN CALCIUM 40 MG/1
40 TABLET, FILM COATED ORAL DAILY
Status: DISCONTINUED | OUTPATIENT
Start: 2024-01-04 | End: 2024-01-05 | Stop reason: HOSPADM

## 2024-01-03 RX ADMIN — MEBROFENIN 7.2 MILLICURIE: 45 INJECTION, POWDER, LYOPHILIZED, FOR SOLUTION INTRAVENOUS at 11:21

## 2024-01-03 RX ADMIN — MIDAZOLAM 2 MG: 1 INJECTION INTRAMUSCULAR; INTRAVENOUS at 14:36

## 2024-01-03 RX ADMIN — TOPICAL ANESTHETIC 0.5 ML: 200 SPRAY DENTAL; PERIODONTAL at 14:37

## 2024-01-03 RX ADMIN — PANTOPRAZOLE SODIUM 40 MG: 40 TABLET, DELAYED RELEASE ORAL at 16:17

## 2024-01-03 RX ADMIN — SODIUM CHLORIDE: 9 INJECTION, SOLUTION INTRAVENOUS at 16:17

## 2024-01-03 RX ADMIN — PANTOPRAZOLE SODIUM 40 MG: 40 TABLET, DELAYED RELEASE ORAL at 09:14

## 2024-01-03 RX ADMIN — SODIUM CHLORIDE: 9 INJECTION, SOLUTION INTRAVENOUS at 03:00

## 2024-01-03 RX ADMIN — ATENOLOL 25 MG: 25 TABLET ORAL at 16:17

## 2024-01-03 RX ADMIN — SINCALIDE 2.4 MCG: 5 INJECTION, POWDER, LYOPHILIZED, FOR SOLUTION INTRAVENOUS at 12:28

## 2024-01-03 RX ADMIN — FENTANYL CITRATE 100 MCG: 50 INJECTION, SOLUTION INTRAMUSCULAR; INTRAVENOUS at 14:36

## 2024-01-03 ASSESSMENT — ACTIVITIES OF DAILY LIVING (ADL)
ADLS_ACUITY_SCORE: 22
ADLS_ACUITY_SCORE: 35
ADLS_ACUITY_SCORE: 22
ADLS_ACUITY_SCORE: 20

## 2024-01-03 NOTE — CONSULTS
GASTROENTEROLOGY CONSULTATION      Radha James  [address] [facility] [address]  58 year old female     Admission Date/Time: 1/2/2024  Primary Care Provider: Tanika, [facility]     We were asked to see the patient in consultation by Dr. Vincent for evaluation of epigastric pain, nausea.    CC: epigastric pain, nausea     HPI:  Radha James is a 58 year old female who presents with epigastric pain, nausea.  This is the patient's third time being seen in the ED since last Friday.  Lab work on arrival unremarkable including normal CBC, CMP, lipase.  CT abdomen/pelvis with IV contrast was normal.  Abdominal ultrasound showed heterogeneous hepatic echogenicity, likely fatty infiltration.    The patient states that over the past week or so she has struggled with persistent and intractable nausea.  This has been associated with dry heaving but little emesis.  She also has had epigastric pain that extends down to the mid abdomen and which she describes as a cramping sensation.  Symptoms are not exacerbated by oral intake, though she endorses that she has been eating little due to her symptoms.  Denies significant heartburn.  Up until this point denies any changes to her bowel habits, though her last bowel movement was last week.    She has not found Zofran to be helpful.  She has found Reglan and Benadryl to be helpful.    Endorses daily use of cannabis.  No tobacco use.  Rare alcohol intake.  She does use Aleve for pain on occasion, sometimes several days per week and other occasions will not use any for a week at a time.    Her mother had colon cancer.  She she reports a recent colonoscopy this year that was normal.  She has never had an upper endoscopy before.      PAST MEDICAL HISTORY:  Patient Active Problem List    Diagnosis Date Noted    Intractable vomiting 01/02/2024     Priority: Medium    Unusual change in behavior 02/22/2021     Priority: Medium    Psychosis, unspecified psychosis  type (H) 02/22/2021     Priority: Medium          ROS: A comprehensive ten point review of systems was negative aside from those in mentioned in the HPI.       MEDICATIONS:   Prior to Admission medications    Medication Sig Start Date End Date Taking? Authorizing Provider   aspirin 81 MG EC tablet Take 81 mg by mouth daily   Yes Unknown, Entered By History   atenolol (TENORMIN) 25 MG tablet Take 25 mg by mouth daily   Yes Unknown, Entered By History   atorvastatin (LIPITOR) 40 MG tablet Take 40 mg by mouth daily   Yes Unknown, Entered By History   diphenhydrAMINE (BENADRYL) 25 MG capsule Take 25 mg by mouth 3 times daily as needed for itching or allergies Takes with Reglan*   Yes Unknown, Entered By History   HYDROcodone-acetaminophen (NORCO) 5-325 MG tablet Take 1 tablet by mouth every 6 hours as needed for severe pain 12/31/23 1/3/24 Yes Jodi Soriano MD   medical cannabis (Patient's own supply) Take 1 Dose by mouth See Admin Instructions (The purpose of this order is to document that the patient reports taking medical cannabis.  This is not a prescription, and is not used to certify that the patient has a qualifying medical condition.)   Yes Reported, Patient   metoclopramide (REGLAN) 5 MG tablet Take 1 tablet (5 mg) by mouth 3 times daily as needed 12/31/23  Yes Jodi Soriano MD        ALLERGIES:   Allergies   Allergen Reactions    Zofran [Ondansetron] Nausea and Vomiting     Self reported        SOCIAL HISTORY:  Social History     Tobacco Use    Smoking status: Every Day     Types: Vaping Device    Smokeless tobacco: Never   Substance Use Topics    Alcohol use: Yes     Alcohol/week: 1.0 standard drink of alcohol     Types: 1 Shots of liquor per week     Comment: socially once a month    Drug use: Yes     Comment: Vaping - THC        FAMILY HISTORY:  Family History   Problem Relation Age of Onset    Colon Cancer Mother     Breast Cancer Paternal Grandmother         PHYSICAL EXAM:   BP (!) 141/76    "Pulse 76   Temp 97.5  F (36.4  C) (Oral)   Resp 18   Ht 1.651 m (5' 5\")   Wt 120.2 kg (265 lb)   SpO2 97%   BMI 44.10 kg/m       PHYSICAL EXAM:  General: alert, oriented, NAD  SKIN: no suspicious lesions, rashes, jaundice, or spider angiomas  EYES: No scleral icterus  RESPIRATORY: unlabored WOB  CARDIOVASCULAR: RRR  GASTROINTESTINAL: obese, +BS, soft, NT, ND, no masses/guarding/rebound  NEURO: alert and oriented  PSYCH: appropriate     LABS:  I reviewed the patient's new clinical lab test results.   Recent Labs   Lab Test 01/02/24 1633 12/31/23 1031 12/29/23 1955 02/22/21  1403   WBC 8.1 7.7 10.2 7.6   HGB 15.7 14.6 15.4 15.4   MCV 94 95 92 95    262 268 306   INR  --   --   --  1.02     Recent Labs   Lab Test 01/02/24 1633 12/31/23 1031 12/29/23 1955    135 135   POTASSIUM 3.8 3.8 3.7   CHLORIDE 98 100 98   CO2 22 18* 17*   BUN 9.9 15.8 10.5   ANIONGAP 15 17* 20*   WENDI 9.6 9.2 9.6     Recent Labs   Lab Test 01/02/24 1633 12/31/23 1031 12/29/23 1955 02/22/21  1403 02/22/21  1323   ALBUMIN 4.7 4.3 4.3   < >  --    BILITOTAL 1.1 0.7 0.9   < >  --    ALT 28 26 28   < >  --    AST 32 41 25   < >  --    ALKPHOS 79 72 82   < >  --    PROTEIN  --   --   --   --  10*   LIPASE 20 154*  --   --   --     < > = values in this interval not displayed.       IMAGING  I personally reviewed the patient's new imaging results.     CONSULTATION ASSESSMENT AND PLAN:    1.  Epigastric pain.   2.  Nausea.    This patient is a 58-year-old female who presents with 1 week of intractable nausea and epigastric pain.  Unremarkable imaging and lab work thus far.  Would recommend an upper endoscopy for further evaluation to evaluate for potential causes for symptoms such as significant esophagitis, gastritis, peptic ulcer disease.  There is also been a HIDA scan ordered for later this morning to rule out biliary dyskinesia.  Other considerations would include cannabis hyperemesis, gastroparesis, constipation at this " point may certainly be contributing.    Plan  --NPO.   --Agree with HIDA scan.   --As long as negative, EGD this afternoon.  --PPI.  --Antiemetics.    Discussed with Dr. Torrez, GI staff physician.    Total time spent:  60 minutes was spent providing patient care, including patient evaluation, reviewing documentation/test results, and . Thank you for asking us to participate in the care of this patient.      Malissa Walker, [facility] ([facility])  [phone]

## 2024-01-03 NOTE — ED PROVIDER NOTES
History     Chief Complaint:  Chest Pain    The history is provided by the patient.     Radha James is a 58 year old female with history of NSTEMI, prediabetes, and CAD presenting for evaluation of chest pain. Radha reports bilateral chest pain, upper abdominal pain and burning epigastric pain. She also notes persistent vomiting for the past five days, accompanied by chills and diaphoresis. She is reportedly unable to keep water or food down and states that her last bowel movement was five days ago. She notes use of Reglan and Norco with minimal relief. She notes a kidney stone removal in 2009 and denies other abdominal surgeries. She denies urinary symptoms. She denies regular alcohol use. She denies a history of GERD. She notes regular THC use for the past year and a half.    Independent Historian:   None - Patient Only    Review of External Notes:   I reviewed the 1/2/24 family medicine note.    Medications:    Reglan  Zyprexa  Zofran  Tenormin  Dulcolax  Cymbalta  Gavilyte    Past Medical History:    Depression  Psychosis  Adenomatous colon polyp  Bipolar 1 disorder  CAD  Dyslipidemia  NSTEMI  Prediabetes    Past Surgical History:    Rogers teeth extraction  Coronary stent placement    Physical Exam   Patient Vitals for the past 24 hrs:   BP Temp Temp src Pulse Resp SpO2 Weight   01/02/24 1427 (!) 174/111 97.6  F (36.4  C) Oral 70 20 98 % 120.2 kg (265 lb)     Physical Exam  Gen: alert  HEENT: no acute abnl  Neck: normal ROM  CV: RRR, no murmurs  Pulm: breath sounds equal, lungs clear  Abd: Soft. Diffuse abdominal tenderness  Back: no evidence of injury, no cva tenderness  MSK: no deformity, moves all extremities  Skin: no rash  Neuro: alert, appropriate conversation and interaction    Emergency Department Course   ECG  ECG taken at 1437  Sinus rhythm  Nonspecific ST abnormality  Abnormal ECG   Rate 64 bpm. OK interval 156 ms. QRS duration 70 ms. QT/QTc 454/468 ms. P-R-T axes 67 63 62.     Imaging:  CT  Abdomen Pelvis w Contrast   Final Result   IMPRESSION:    1.  No acute findings to explain the patient's abdominal symptoms.      US Abdomen Limited   Final Result   IMPRESSION:   1.  Heterogeneous hepatic echogenicity most likely representing diffuse fatty infiltration.              Laboratory:  Labs Ordered and Resulted from Time of ED Arrival to Time of ED Departure   BASIC METABOLIC PANEL - Abnormal       Result Value    Sodium 135      Potassium 3.8      Chloride 98      Carbon Dioxide (CO2) 22      Anion Gap 15      Urea Nitrogen 9.9      Creatinine 0.82      GFR Estimate 83      Calcium 9.6      Glucose 139 (*)    TROPONIN T, HIGH SENSITIVITY - Normal    Troponin T, High Sensitivity 7     D DIMER QUANTITATIVE - Normal    D-Dimer Quantitative 0.37     HEPATIC FUNCTION PANEL - Normal    Protein Total 8.3      Albumin 4.7      Bilirubin Total 1.1      Alkaline Phosphatase 79      AST 32      ALT 28      Bilirubin Direct 0.23     LIPASE - Normal    Lipase 20     CBC WITH PLATELETS AND DIFFERENTIAL    WBC Count 8.1      RBC Count 4.77      Hemoglobin 15.7      Hematocrit 44.6      MCV 94      MCH 32.9      MCHC 35.2      RDW 12.8      Platelet Count 285      % Neutrophils 82      % Lymphocytes 11      % Monocytes 4      % Eosinophils 1      % Basophils 1      % Immature Granulocytes 1      NRBCs per 100 WBC 0      Absolute Neutrophils 6.8      Absolute Lymphocytes 0.9      Absolute Monocytes 0.3      Absolute Eosinophils 0.0      Absolute Basophils 0.0      Absolute Immature Granulocytes 0.0      Absolute NRBCs 0.0       Emergency Department Course & Assessments:       Interventions:  Medications   sodium chloride 0.9% BOLUS 1,000 mL (has no administration in time range)   ondansetron (ZOFRAN) injection 4 mg (4 mg Intravenous Not Given 1/2/24 1947)   sodium chloride 0.9% BOLUS 1,000 mL (1,000 mLs Intravenous $New Bag 1/2/24 1951)   prochlorperazine (COMPAZINE) injection 10 mg (10 mg Intravenous $Given 1/2/24 1947)    diphenhydrAMINE (BENADRYL) injection 12.5 mg (12.5 mg Intravenous $Given 1/2/24 1947)   pantoprazole (PROTONIX) IV push injection 40 mg (40 mg Intravenous $Given 1/2/24 1947)   oxyCODONE (ROXICODONE) tablet 5 mg (5 mg Oral $Given 1/2/24 1947)   iopamidol (ISOVUE-370) solution 500 mL (100 mLs Intravenous $Given 1/2/24 2041)   sodium chloride (PF) 0.9% PF flush 100 mL (65 mLs Intravenous $Given 1/2/24 2041)     Assessments:  1919 I obtained history and examined the patient as noted above.  2232 I rechecked the patient.      Independent Interpretation (X-rays, CTs, rhythm strip):  None.    Consultations/Discussion of Management or Tests:   ED Course as of 01/02/24 2310 Tue Jan 02, 2024 2308 I spoke with Dr. Gutierrez of the hospitalist team regarding the patient, who accepted the patient for admission.     Social Determinants of Health affecting care:       Disposition:  The patient was admitted to the hospital under the care of Dr. Gutierrez.      Impression & Plan    Medical Decision Making:  Patient presents for intractable nausea vomiting and epigastric abdominal pain.  Laboratory studies overall reassuring.  Previously, there was minimally elevated lipase however recheck today shows normal lipase.  CT abdomen was negative for acute intra-abdominal obstructive or infectious process.  Right upper ultrasound showed hepatic steatosis however no evidence of other biliary pathology.  Patient was given medications as well as IV fluids.  She has failed management with Norco and Reglan at home.  She is allergic to Zofran.  I this is patient's third acute care visit for the symptoms and she has failed outpatient management.    Of note, regarding the chest pain component, EKG is nonischemic.  Troponin negative.  Prior CT chest PE negative.  D-dimer again negative today.  Feel this is likely referred abdominal symptoms rather than primary cardiac concern.  Did not feel that we needed to reimage her chest today.  She require  admission for IV fluids and symptom control.  At this time, I feel most likely diagnosis is cannabis hyperemesis.  I discussed with her side effects of marijuana use and encouraged her to abstain.    Diagnosis:    ICD-10-CM    1. Intractable vomiting  R11.10         Scribe Disclosure:  I, Andrea Hernandez, am serving as a scribe at 6:56 PM on 1/2/2024 to document services personally performed by Tisha Douglas MD based on my observations and the provider's statements to me.   1/2/2024   Tisha Douglas MD Trussell, Kristi Jo Schneider, MD  01/03/24 0141

## 2024-01-03 NOTE — PROGRESS NOTES
[facility]    Hospitalist Progress Note             Date of Admission:  1/2/2024                   Day of hospitalization: 0    Assessment and Plan:      58-year-old female who is a daily cannabis user who presents to the hospital today for concerns about epigastric pain and nausea.  This is the patient's third visit to the ER in the past 1 week.  Her previous evaluations in the ER included negative troponin enzymes and chest/abdomen/pelvis CT scan showing no acute findings     The patient has no history of peptic ulcer disease.  No history of gallbladder disease.  Intermittent use of Aleve.  Chronic daily THC user since August 2022     In the ER this evening, vital signs notable for systolic blood pressure near 175.  Heart rate 70.  No fever.  No hypoxia     Imaging this even included CT scan of the abdomen pelvis showing no acute findings.  Abdominal ultrasound was done showing fatty infiltration of the liver otherwise no acute findings.  No gallstones.     Epigastric pain  Esophagitis  Gastritis  - EGD shows esophagitis, and gastritis continue PPI  -Her HIDA scan shows possible dyskinesia discussed this with gastroenterology they recommend continuing PPI for 2 weeks if symptoms persist referral to general surgery for cholecystectomy  -Patient does not NSAIDs for lower back pain will need to educate patient about cessation of NSAIDs in the setting of gastritis and esophagitis     Morbid obesity   -complicates care  Chronic medical issues  History of depression/psychosis bipolar 1 disorder/  CAD- S/p ROSA to OM2 5/27/2021 (70% significant stenosis), resume aspirin tomorrow  Dyslipidemia-resume statin  Prediabetes  HTN- resume atenolol   ---------     # Code status: full   # Anticipated discharge date and Disposition:1-2 days  # DVT: scds  # IVF:none                        Hilda Vincent MD  Text Page (7am - 6pm, M-F)               Subjective   Chief Complaint:  Abdominal pain  Subjective:  "+abdominal pain, up to esophagus, sharp, no hematochezia, possible specks of blood in emesis.            Objective   BP (!) 155/84   Pulse 72   Temp 97.2  F (36.2  C) (Temporal)   Resp 16   Ht 1.651 m (5' 5\")   Wt 120.2 kg (265 lb)   SpO2 98%   BMI 44.10 kg/m       Physical Exam  General: Pt in NAD, normal appearance  HEENT: OP clear MMM, no JVD  Lungs: Clear to Auscultation Bilateral, normal breathing  without accessory muscle usage, no wheezing, rhonchi or crackles  Cardiac: +S1, S2, RRR, no MRG, no edema  Abdominal: normal bowel sounds, NT/ND  Skin: warm, dry, normal turgor, no rash  Psyche: A& O x3, appropriate affect           No intake or output data in the 24 hours ending 01/03/24 1546        Labs and Imaging Results:      Recent Labs   Lab 01/02/24  1633 12/31/23  1031   WBC 8.1 7.7   HGB 15.7 14.6    262        Recent Labs   Lab 01/02/24  1633 12/31/23  1031    135   CO2 22 18*   BUN 9.9 15.8      No results for input(s): \"INR\", \"PTT\" in the last 168 hours.   No results for input(s): \"CKMB\" in the last 168 hours.    Invalid input(s): \"TROPONINT\"     Recent Labs   Lab 01/02/24  1633 12/31/23  1031   ALBUMIN 4.7 4.3   AST 32 41   ALT 28 26   ALKPHOS 79 72        Micro:     Radio:  NM Hepatobiliary Scan with GB EF and/or Pharm   Final Result   IMPRESSION:       Findings suspicious for biliary dyskinesia.      CT Abdomen Pelvis w Contrast   Final Result   IMPRESSION:    1.  No acute findings to explain the patient's abdominal symptoms.      US Abdomen Limited   Final Result   IMPRESSION:   1.  Heterogeneous hepatic echogenicity most likely representing diffuse fatty infiltration.                    Medications:      Scheduled Meds:     pantoprazole  40 mg Oral BID AC     Continuous Infusions:     sodium chloride      sodium chloride 100 mL/hr at 01/03/24 0300     PRN Meds:  acetaminophen **OR** acetaminophen, ondansetron **OR** ondansetron, oxyCODONE, senna-docusate **OR** senna-docusate   "

## 2024-01-03 NOTE — ED NOTES
[facility]  ED Nurse Handoff Report    ED Chief complaint: Chest Pain  . ED Diagnosis:   Final diagnoses:   None       Allergies:   Allergies   Allergen Reactions    Zofran [Ondansetron] Nausea and Vomiting     Self reported       Code Status: Full Code    Activity level - Baseline/Home:  independent.  Activity Level - Current:   standby.   Lift room needed: No.   Bariatric: No   Needed: No   Isolation: No.   Infection: Not Applicable.     Respiratory status: Room air    Vital Signs (within 30 minutes):   Vitals:    01/02/24 1427   BP: (!) 174/111   Pulse: 70   Resp: 20   Temp: 97.6  F (36.4  C)   TempSrc: Oral   SpO2: 98%   Weight: 120.2 kg (265 lb)       Cardiac Rhythm:  ,      Pain level:    Patient confused: No.   Patient Falls Risk: nonskid shoes/slippers when out of bed and patient and family education.   Elimination Status: Has voided     Patient Report - Initial Complaint: Chest pain.   Focused Assessment: Pt arrives with nausea and chest pain that started on Sunday. Pt was recently seen on Sunday for similar episode, pt left feeling better but symptoms have returned. ABCS intact and Aox4.              Abnormal Results:   Labs Ordered and Resulted from Time of ED Arrival to Time of ED Departure   BASIC METABOLIC PANEL - Abnormal       Result Value    Sodium 135      Potassium 3.8      Chloride 98      Carbon Dioxide (CO2) 22      Anion Gap 15      Urea Nitrogen 9.9      Creatinine 0.82      GFR Estimate 83      Calcium 9.6      Glucose 139 (*)    TROPONIN T, HIGH SENSITIVITY - Normal    Troponin T, High Sensitivity 7     D DIMER QUANTITATIVE - Normal    D-Dimer Quantitative 0.37     HEPATIC FUNCTION PANEL - Normal    Protein Total 8.3      Albumin 4.7      Bilirubin Total 1.1      Alkaline Phosphatase 79      AST 32      ALT 28      Bilirubin Direct 0.23     LIPASE - Normal    Lipase 20     CBC WITH PLATELETS AND DIFFERENTIAL    WBC Count 8.1      RBC Count 4.77       Hemoglobin 15.7      Hematocrit 44.6      MCV 94      MCH 32.9      MCHC 35.2      RDW 12.8      Platelet Count 285      % Neutrophils 82      % Lymphocytes 11      % Monocytes 4      % Eosinophils 1      % Basophils 1      % Immature Granulocytes 1      NRBCs per 100 WBC 0      Absolute Neutrophils 6.8      Absolute Lymphocytes 0.9      Absolute Monocytes 0.3      Absolute Eosinophils 0.0      Absolute Basophils 0.0      Absolute Immature Granulocytes 0.0      Absolute NRBCs 0.0          CT Abdomen Pelvis w Contrast    (Results Pending)   US Abdomen Limited    (Results Pending)       Treatments provided: IV, labs, images  Family Comments: none  OBS brochure/video discussed/provided to patient:  Yes  ED Medications:   Medications   sodium chloride 0.9% BOLUS 1,000 mL (has no administration in time range)   sodium chloride 0.9% BOLUS 1,000 mL (1,000 mLs Intravenous $New Bag 1/2/24 1951)   ondansetron (ZOFRAN) injection 4 mg (4 mg Intravenous Not Given 1/2/24 1947)   prochlorperazine (COMPAZINE) injection 10 mg (10 mg Intravenous $Given 1/2/24 1947)   diphenhydrAMINE (BENADRYL) injection 12.5 mg (12.5 mg Intravenous $Given 1/2/24 1947)   pantoprazole (PROTONIX) IV push injection 40 mg (40 mg Intravenous $Given 1/2/24 1947)   oxyCODONE (ROXICODONE) tablet 5 mg (5 mg Oral $Given 1/2/24 1947)       Drips infusing:  No  For the majority of the shift this patient was Green.   Interventions performed were na.    Sepsis treatment initiated: No    Cares/treatment/interventions/medications to be completed following ED care: per orders    ED Nurse Name: Prince Krishnamurthy RN  8:30 PM        RECEIVING UNIT ED HANDOFF REVIEW    Above ED Nurse Handoff Report was reviewed: Yes  Reviewed by: Chela Alegria RN on January 3, 2024 at 7:42 PM

## 2024-01-03 NOTE — PHARMACY-ADMISSION MEDICATION HISTORY
Pharmacist Admission Medication History    Admission medication history is complete. The information provided in this note is only as accurate as the sources available at the time of the update.    Information Source(s): Patient via in-person    Pertinent Information: does not need medical cannabis while hospitalized per patient. Does NOT take Duloxetine.    Changes made to PTA medication list:  Added: asa, atenolol, atorvastatin, benadryl  Deleted: ibuprofen, olanzapine, zofran  Changed: None    Medication Affordability:  Not including over the counter (OTC) medications, was there a time in the past 3 months when you did not take your medications as prescribed because of cost?: No    Allergies reviewed with patient and updates made in EHR: yes (Zofran made her more nauseated).  Prior to Admission medications    Medication Sig Last Dose Taking? Auth Provider Long Term End Date   aspirin 81 MG EC tablet Take 81 mg by mouth daily 1/1/2024 Yes Unknown, Entered By History     atenolol (TENORMIN) 25 MG tablet Take 25 mg by mouth daily 1/1/2024 Yes Unknown, Entered By History Yes    atorvastatin (LIPITOR) 40 MG tablet Take 40 mg by mouth daily 1/1/2024 Yes Unknown, Entered By History Yes    diphenhydrAMINE (BENADRYL) 25 MG capsule Take 25 mg by mouth 3 times daily as needed for itching or allergies Takes with Reglan* prn Yes Unknown, Entered By History     HYDROcodone-acetaminophen (NORCO) 5-325 MG tablet Take 1 tablet by mouth every 6 hours as needed for severe pain prn Yes Jodi Soriano MD  1/3/24   medical cannabis (Patient's own supply) Take 1 Dose by mouth See Admin Instructions (The purpose of this order is to document that the patient reports taking medical cannabis.  This is not a prescription, and is not used to certify that the patient has a qualifying medical condition.) prn at vaping, gummies Yes Reported, Patient     metoclopramide (REGLAN) 5 MG tablet Take 1 tablet (5 mg) by mouth 3 times daily as  needed prn Yes Jodi Soriano MD

## 2024-01-03 NOTE — PRE-PROCEDURE
INPATIENT PRE-PROCEDURE NOTE    CHIEF COMPLAINT / REASON FOR PROCEDURE: nausea and vomiting    Admission History and Physical Reviewed, including past medical history, social history family history, ROS, and interval progress notes: on chart-<30 days old; updated within 7 days.    PRE-SEDATION ASSESSMENT:  Lung Exam: normal  Heart Exam: normal  Airway Exam: Normal  Previous reaction to anesthesia/sedation: NO  Sedation plan based on assessment:Moderate (conscious) sedation  ASA Classification: 2 - Mild systemic disease       IMPRESSION: nausea and vomiting    PLAN: JONATHAN Torrez MD

## 2024-01-03 NOTE — H&P
Hospitalist Admission   History and Physical    CC: Epigastric pain, nausea    HPI: 58-year-old female who is a daily cannabis user who presents to the hospital today for concerns about epigastric pain and nausea.  This is the patient's third visit to the ER in the past 1 week.  Her previous evaluations in the ER included negative troponin enzymes and chest/abdomen/pelvis CT scan showing no acute findings    The patient has no history of peptic ulcer disease.  No history of gallbladder disease.  Intermittent use of Aleve.  Chronic daily THC user since August 2022    In the ER this evening, vital signs notable for systolic blood pressure near 175.  Heart rate 70.  No fever.  No hypoxia    Imaging this even included CT scan of the abdomen pelvis showing no acute findings.  Abdominal ultrasound was done showing fatty infiltration of the liver otherwise no acute findings.  No gallstones.    I spoke with the ER provider who is requesting observation admission for this patient    PMH:  Past Medical History:   Diagnosis Date    Depression         Medications:  Current Facility-Administered Medications   Medication    sodium chloride 0.9% BOLUS 1,000 mL     Current Outpatient Medications   Medication    HYDROcodone-acetaminophen (NORCO) 5-325 MG tablet    ibuprofen (ADVIL/MOTRIN) 200 MG tablet    medical cannabis (Patient's own supply)    metoclopramide (REGLAN) 5 MG tablet    OLANZapine (ZYPREXA) 10 MG tablet    OLANZapine (ZYPREXA) 20 MG tablet    ondansetron (ZOFRAN ODT) 4 MG ODT tab        Allergies:     Allergies   Allergen Reactions    Zofran [Ondansetron] Nausea and Vomiting     Self reported        Family History:  noncontributory    Social History: Daily cannabis use.  Rare alcohol use.  Presents with significant other    Review of Systems: Comprehensive greater than 10 point review systems otherwise negative besides that detailed above    Physical exam:  BP (!) 174/111   Pulse 70   Temp 97.6  F (36.4  C) (Oral)    Resp 20   Wt 120.2 kg (265 lb)   SpO2 98%    PSYCH: pleasant, oriented, No acute distress.  HEART:  Normal S1, S2 with no edema.  LUNGS:  Clear to auscultation, normal Respiratory effort.  ABDOMEN: Tenderness palpation in the epigastrium on exam.  No rebound.  No guarding.  No organomegaly  SKIN:  Dry to touch, No rash.     Pertinent laboratory and imaging data reviewed above in HPI         Impression:  58-year-old female who is a daily cannabis user who presents to the hospital today for concerns about epigastric pain and nausea.  This is the patient's third visit to the ER in the past 1 week.  Her previous evaluations in the ER included negative troponin enzymes and chest/abdomen/pelvis CT scan showing no acute findings    In the ER this evening, vital signs notable for systolic blood pressure near 175.  Heart rate 70.  No fever.  No hypoxia    Labs this evening including CBC, BMP, LFTs, lipase and troponin are all unremarkable.    Imaging this even included CT scan of the abdomen pelvis showing no acute findings.  Abdominal ultrasound was done showing fatty infiltration of the liver otherwise no acute findings.  No gallstones.    1.  Epigastric pain with intractable nausea: Differential diagnosis includes gastritis/peptic ulcer disease, gallbladder dyskinesia/dysfunction, and effects of chronic daily cannabis use.    2.  Obesity    3.  Chronic daily cannabis use        Plan:    1.  [place] to observation    2.  N.p.o. after midnight    3.  Empiric PPI    4.  IV fluid hydration    5.  GI consultation for consideration of endoscopy to rule out gastritis/peptic ulcer disease as a cause of her symptoms    6.  Will order a HIDA scan to rule out gallbladder dyskinesia/dysfunction    7.  If above workup negative, would recommend cutting down on cannabis use      CODE STATUS: Full code

## 2024-01-03 NOTE — PLAN OF CARE
"[facility]    ED Boarding Nurse Handoff Addendum Report:    Date/time: 1/3/2024, 5:19 AM    Activity Level: standby, not oob during shift    Fall Risk: Yes:  nonskid shoes/slippers when out of bed, arm band in place, and activity supervised    Active Infusions:  mL/hr    Current Meds Due: see MAR    Current care needs: IV fluids, antiemetics, monitor labs/imaging, obs    Oxygen requirements (liters/min and/or FiO2): N/A    Respiratory status: Room air    Vital signs (within last 30 minutes):    Vitals:    01/02/24 1427 01/03/24 0214 01/03/24 0220 01/03/24 0452   BP: (!) 174/111 (!) 178/91  139/68   BP Location:  Left arm  Left arm   Pulse: 70 70  82   Resp: 20 18  20   Temp: 97.6  F (36.4  C) 98  F (36.7  C)  98.1  F (36.7  C)   TempSrc: Oral Oral  Oral   SpO2: 98%      Weight: 120.2 kg (265 lb)      Height:   1.651 m (5' 5\")        Focused assessment within last 30 minutes:    A&Ox4, currently denies pain/chest pain,N/V, LS clear on RA, NPO, SBA.     ED Boarding Nurse name: Kianna Cam RN   "

## 2024-01-04 PROCEDURE — 250N000013 HC RX MED GY IP 250 OP 250 PS 637: Performed by: INTERNAL MEDICINE

## 2024-01-04 PROCEDURE — 250N000011 HC RX IP 250 OP 636: Performed by: INTERNAL MEDICINE

## 2024-01-04 PROCEDURE — 96376 TX/PRO/DX INJ SAME DRUG ADON: CPT

## 2024-01-04 PROCEDURE — 250N000011 HC RX IP 250 OP 636: Performed by: HOSPITALIST

## 2024-01-04 PROCEDURE — 250N000011 HC RX IP 250 OP 636: Mod: JZ | Performed by: HOSPITALIST

## 2024-01-04 PROCEDURE — 88342 IMHCHEM/IMCYTCHM 1ST ANTB: CPT | Mod: 26 | Performed by: PATHOLOGY

## 2024-01-04 PROCEDURE — 250N000013 HC RX MED GY IP 250 OP 250 PS 637: Performed by: HOSPITALIST

## 2024-01-04 PROCEDURE — 120N000001 HC R&B MED SURG/OB

## 2024-01-04 PROCEDURE — 96375 TX/PRO/DX INJ NEW DRUG ADDON: CPT

## 2024-01-04 PROCEDURE — 88305 TISSUE EXAM BY PATHOLOGIST: CPT | Mod: 26 | Performed by: PATHOLOGY

## 2024-01-04 PROCEDURE — 99232 SBSQ HOSP IP/OBS MODERATE 35: CPT | Performed by: HOSPITALIST

## 2024-01-04 PROCEDURE — 258N000003 HC RX IP 258 OP 636: Performed by: INTERNAL MEDICINE

## 2024-01-04 PROCEDURE — G0378 HOSPITAL OBSERVATION PER HR: HCPCS

## 2024-01-04 RX ORDER — HYDROMORPHONE HYDROCHLORIDE 4 MG/1
4 TABLET ORAL
Status: DISCONTINUED | OUTPATIENT
Start: 2024-01-04 | End: 2024-01-05 | Stop reason: HOSPADM

## 2024-01-04 RX ORDER — NALOXONE HYDROCHLORIDE 0.4 MG/ML
0.2 INJECTION, SOLUTION INTRAMUSCULAR; INTRAVENOUS; SUBCUTANEOUS
Status: DISCONTINUED | OUTPATIENT
Start: 2024-01-04 | End: 2024-01-05 | Stop reason: HOSPADM

## 2024-01-04 RX ORDER — NALOXONE HYDROCHLORIDE 0.4 MG/ML
0.4 INJECTION, SOLUTION INTRAMUSCULAR; INTRAVENOUS; SUBCUTANEOUS
Status: DISCONTINUED | OUTPATIENT
Start: 2024-01-04 | End: 2024-01-05 | Stop reason: HOSPADM

## 2024-01-04 RX ORDER — DIPHENHYDRAMINE HCL 25 MG
25 CAPSULE ORAL EVERY 6 HOURS PRN
Status: DISCONTINUED | OUTPATIENT
Start: 2024-01-04 | End: 2024-01-05 | Stop reason: HOSPADM

## 2024-01-04 RX ORDER — DIPHENHYDRAMINE HYDROCHLORIDE 50 MG/ML
25 INJECTION INTRAMUSCULAR; INTRAVENOUS EVERY 6 HOURS PRN
Status: DISCONTINUED | OUTPATIENT
Start: 2024-01-04 | End: 2024-01-05 | Stop reason: HOSPADM

## 2024-01-04 RX ORDER — ENOXAPARIN SODIUM 100 MG/ML
40 INJECTION SUBCUTANEOUS EVERY 12 HOURS
Status: DISCONTINUED | OUTPATIENT
Start: 2024-01-04 | End: 2024-01-05 | Stop reason: HOSPADM

## 2024-01-04 RX ORDER — HYDROMORPHONE HYDROCHLORIDE 1 MG/ML
0.5 INJECTION, SOLUTION INTRAMUSCULAR; INTRAVENOUS; SUBCUTANEOUS EVERY 6 HOURS PRN
Status: DISCONTINUED | OUTPATIENT
Start: 2024-01-04 | End: 2024-01-05 | Stop reason: HOSPADM

## 2024-01-04 RX ORDER — PROCHLORPERAZINE MALEATE 5 MG
5 TABLET ORAL EVERY 6 HOURS PRN
Status: DISCONTINUED | OUTPATIENT
Start: 2024-01-04 | End: 2024-01-05 | Stop reason: HOSPADM

## 2024-01-04 RX ORDER — HYDROMORPHONE HCL IN WATER/PF 6 MG/30 ML
0.2 PATIENT CONTROLLED ANALGESIA SYRINGE INTRAVENOUS ONCE
Status: COMPLETED | OUTPATIENT
Start: 2024-01-04 | End: 2024-01-04

## 2024-01-04 RX ORDER — CAPSAICIN 0.025 %
CREAM (GRAM) TOPICAL 3 TIMES DAILY PRN
Status: DISCONTINUED | OUTPATIENT
Start: 2024-01-04 | End: 2024-01-05 | Stop reason: HOSPADM

## 2024-01-04 RX ORDER — HYDROMORPHONE HYDROCHLORIDE 1 MG/ML
0.5 INJECTION, SOLUTION INTRAMUSCULAR; INTRAVENOUS; SUBCUTANEOUS
Status: DISCONTINUED | OUTPATIENT
Start: 2024-01-04 | End: 2024-01-04

## 2024-01-04 RX ORDER — METOCLOPRAMIDE 5 MG/1
10 TABLET ORAL 3 TIMES DAILY PRN
Status: DISCONTINUED | OUTPATIENT
Start: 2024-01-05 | End: 2024-01-05 | Stop reason: HOSPADM

## 2024-01-04 RX ORDER — SODIUM CHLORIDE AND POTASSIUM CHLORIDE 150; 900 MG/100ML; MG/100ML
INJECTION, SOLUTION INTRAVENOUS CONTINUOUS
Status: DISCONTINUED | OUTPATIENT
Start: 2024-01-04 | End: 2024-01-05 | Stop reason: HOSPADM

## 2024-01-04 RX ORDER — SUCRALFATE ORAL 1 G/10ML
1 SUSPENSION ORAL
Status: DISCONTINUED | OUTPATIENT
Start: 2024-01-04 | End: 2024-01-05 | Stop reason: HOSPADM

## 2024-01-04 RX ORDER — PROCHLORPERAZINE 25 MG
25 SUPPOSITORY, RECTAL RECTAL EVERY 12 HOURS PRN
Status: DISCONTINUED | OUTPATIENT
Start: 2024-01-04 | End: 2024-01-05 | Stop reason: HOSPADM

## 2024-01-04 RX ADMIN — PANTOPRAZOLE SODIUM 40 MG: 40 TABLET, DELAYED RELEASE ORAL at 07:06

## 2024-01-04 RX ADMIN — SUCRALFATE 1 G: 1 SUSPENSION ORAL at 10:01

## 2024-01-04 RX ADMIN — HYDROMORPHONE HYDROCHLORIDE 0.2 MG: 0.2 INJECTION, SOLUTION INTRAMUSCULAR; INTRAVENOUS; SUBCUTANEOUS at 07:59

## 2024-01-04 RX ADMIN — ATORVASTATIN CALCIUM 40 MG: 40 TABLET, FILM COATED ORAL at 10:02

## 2024-01-04 RX ADMIN — METOCLOPRAMIDE HYDROCHLORIDE 5 MG: 5 INJECTION INTRAMUSCULAR; INTRAVENOUS at 05:33

## 2024-01-04 RX ADMIN — ASPIRIN 81 MG: 81 TABLET, COATED ORAL at 10:02

## 2024-01-04 RX ADMIN — ONDANSETRON 4 MG: 2 INJECTION INTRAMUSCULAR; INTRAVENOUS at 07:06

## 2024-01-04 RX ADMIN — SODIUM CHLORIDE: 9 INJECTION, SOLUTION INTRAVENOUS at 01:00

## 2024-01-04 RX ADMIN — METOCLOPRAMIDE HYDROCHLORIDE 5 MG: 5 INJECTION INTRAMUSCULAR; INTRAVENOUS at 11:18

## 2024-01-04 RX ADMIN — HYDROMORPHONE HYDROCHLORIDE 0.5 MG: 1 INJECTION, SOLUTION INTRAMUSCULAR; INTRAVENOUS; SUBCUTANEOUS at 11:12

## 2024-01-04 RX ADMIN — SUCRALFATE 1 G: 1 SUSPENSION ORAL at 21:37

## 2024-01-04 RX ADMIN — POTASSIUM CHLORIDE AND SODIUM CHLORIDE: 900; 150 INJECTION, SOLUTION INTRAVENOUS at 11:16

## 2024-01-04 RX ADMIN — POTASSIUM CHLORIDE AND SODIUM CHLORIDE: 900; 150 INJECTION, SOLUTION INTRAVENOUS at 21:37

## 2024-01-04 RX ADMIN — SUCRALFATE 1 G: 1 SUSPENSION ORAL at 16:18

## 2024-01-04 RX ADMIN — PROCHLORPERAZINE MALEATE 5 MG: 5 TABLET ORAL at 20:22

## 2024-01-04 RX ADMIN — ATENOLOL 25 MG: 25 TABLET ORAL at 10:02

## 2024-01-04 RX ADMIN — PANTOPRAZOLE SODIUM 40 MG: 40 TABLET, DELAYED RELEASE ORAL at 16:18

## 2024-01-04 RX ADMIN — OXYCODONE HYDROCHLORIDE 5 MG: 5 TABLET ORAL at 05:07

## 2024-01-04 RX ADMIN — OXYCODONE HYDROCHLORIDE 5 MG: 5 TABLET ORAL at 10:05

## 2024-01-04 RX ADMIN — DIPHENHYDRAMINE HYDROCHLORIDE 25 MG: 50 INJECTION, SOLUTION INTRAMUSCULAR; INTRAVENOUS at 11:14

## 2024-01-04 RX ADMIN — ENOXAPARIN SODIUM 40 MG: 40 INJECTION SUBCUTANEOUS at 16:18

## 2024-01-04 RX ADMIN — SUCRALFATE 1 G: 1 SUSPENSION ORAL at 11:15

## 2024-01-04 RX ADMIN — PROCHLORPERAZINE EDISYLATE 5 MG: 5 INJECTION INTRAMUSCULAR; INTRAVENOUS at 08:54

## 2024-01-04 ASSESSMENT — ACTIVITIES OF DAILY LIVING (ADL)
ADLS_ACUITY_SCORE: 20

## 2024-01-04 NOTE — PLAN OF CARE
PRIMARY DIAGNOSIS: Hyperemesis , Epigastric pain and Nausea, Esophagitis , Gastritis.  OUTPATIENT/OBSERVATION GOALS TO BE MET BEFORE DISCHARGE:  ADLs back to baseline: Yes    Activity and level of assistance: Ambulating independently.    Pain status: Denies pain    Return to near baseline physical activity: Yes     Discharge Planner Nurse   Safe discharge environment identified: Yes  Barriers to discharge: Yes       Entered by: Chela Alegria RN 01/03/2024 10:32 PM     Please review provider order for any additional goals.   Nurse to notify provider when observation goals have been met and patient is ready for discharge.

## 2024-01-04 NOTE — PROGRESS NOTES
PRIMARY DIAGNOSIS: GASTRITIS/EPIGASTRIC PAIN    OUTPATIENT/OBSERVATION GOALS TO BE MET BEFORE DISCHARGE  1. Orthostatic performed: N/A    2. Tolerating PO fluid and/or antibiotics (if applicable): Yes    3. Nausea/Vomiting/Diarrhea symptoms improved: Yes    4. Pain status: Pain free.    5. Return to near baseline physical activity: Yes    Discharge Planner Nurse   Safe discharge environment identified: No  Barriers to discharge: Yes       Entered by: Elham Palm RN 01/03/2024 7:22 PM   Pt A&O x 4. VSS. Up independently. On room air. BS+. LS clear. Denies pain, nausea or SOB.  Please review provider order for any additional goals.   Nurse to notify provider when observation goals have been met and patient is ready for discharge.

## 2024-01-04 NOTE — UTILIZATION REVIEW
Admission Status; Secondary Review Determination       Under the authority of the Utilization Management Committee, the utilization review process indicated a secondary review on the above patient. The review outcome is based on review of the medical records, discussions with staff, and applying clinical experience noted on the date of the review.     (x) Inpatient Status Appropriate - This patient's medical care is consistent with medical management for inpatient care and reasonable inpatient medical practice.     RATIONALE FOR DETERMINATION     Patient requires inpatient admission versus short stay observation or outpatient treatment for the following reasons:  58-year-old female with past medical history of coronary artery disease, hyperlipidemia, bipolar disease, daily cannabis use who presents with 1 week of intractable nausea and epigastric pain and found to have esophagitis and gastritis on EGD with a positive HIDA scan.  The patient is undergoing symptomatic treatments including antiemetics, IV fluids, and twice daily PPI.  Has received 2 doses of IV opiates since admission, 2 dosages of IV reglan, and 1 of zofran. Continues on IVF. Continues to have nausea and vomiting today. Given continued severe symptoms that preclude adequate oral intake necessitating IV symptomatic treatment not capable of being delivered in lower level of care despite observation care to date, would advance this patient to inpatient status per MCG guidelines.     The expected length of stay at the time of admission was more than 2 nights because of the severity of illness, intensity of service provided, and risk for adverse outcome. Inpatient admission is appropriate.         This document was produced using voice recognition software       The information on this document is developed by the utilization review team in order for the business office to ensure compliance. This only denotes the appropriateness of proper admission  status and does not reflect the quality of care rendered.   The definitions of Inpatient Status and Observation Status used in making the determination above are those provided in the CMS Coverage Manual, Chapter 1 and Chapter 6, section 70.4.   Sincerely,   Beni Franco DO  Physician Advisor  [facility].

## 2024-01-04 NOTE — PROGRESS NOTES
"[facility]  Hospitalist Progress Note             Date of Admission:  1/2/2024                   Assessment and Plan:      58-year-old female with history of morbid obesity, HTN, HLP, daily cannabis use admitted on 1/2/2024 with abdominal pain, n/v.  CT scan negative for acute findings  US shows fatty liver  S/p EGD showing esophagitis and gastritis  HIDA shows possible biliary dyskinesia    Epigastric pain  Esophagitis  Gastritis    - EGD shows esophagitis, and gastritis     - cont oral PPI BID    - needs to stop NSAID use    - had another episode this am of pain/n/v    - will try and transition her to oral meds to prepare for discharge    - feeling better this afternoon, advance diet    - cont IVF until we are sure she is tolerating PO    Possible biliary dyskinesia    - HIDA results shows possible biliary dyskinesia    - outpatient follow-up    HTN    - cont atenolol    HLP    - cont lipitor     Morbid obesity     - complicates care    History of depression/psychosis bipolar 1 disorder    - not on medications    CAD    - s/p ROSA to OM2 5/27/2021 (70% significant stenosis), resume aspirin tomorrow     in room and updated                    Randal Ashby MD  Text Page (7am - 6pm, M-F)               Subjective   Patient this morning had severe abdominal pain with nausea and vomiting.  Medications were ordered for her symptoms.  She is now feeling much better.  She has tolerated pudding and water.  She is about to order some food.  I offered to discharge her home.  She is worried that her symptoms will return.  I indicated we could try to control her symptoms with oral medications and keep her overnight and hopefully be able to discharge her in the morning.  No other new symptoms.     Objective   BP (!) 152/75 (BP Location: Left arm)   Pulse 68   Temp 98  F (36.7  C) (Oral)   Resp 18   Ht 1.651 m (5' 5\")   Wt 121.5 kg (267 lb 14.4 oz)   SpO2 95%   BMI 44.58 kg/m       Physical " "Exam  General: Pt in NAD, normal appearance  HEENT: OP clear MMM, no JVD     No intake or output data in the 24 hours ending 01/03/24 1546        Labs and Imaging Results:      Recent Labs   Lab 01/03/24  1540 01/02/24  1633   WBC 7.2 8.1   HGB 13.5 15.7    285        Recent Labs   Lab 01/03/24  1540 01/02/24  1633    135   CO2 22 22   BUN 8.6 9.9      No results for input(s): \"INR\", \"PTT\" in the last 168 hours.   No results for input(s): \"CKMB\" in the last 168 hours.    Invalid input(s): \"TROPONINT\"     Recent Labs   Lab 01/03/24  1540 01/02/24  1633   ALBUMIN 3.8 4.7   AST 27 32   ALT 24 28   ALKPHOS 59 79        Micro:     Radio:  NM Hepatobiliary Scan with GB EF and/or Pharm   Final Result   IMPRESSION:       Findings suspicious for biliary dyskinesia.      CT Abdomen Pelvis w Contrast   Final Result   IMPRESSION:    1.  No acute findings to explain the patient's abdominal symptoms.      US Abdomen Limited   Final Result   IMPRESSION:   1.  Heterogeneous hepatic echogenicity most likely representing diffuse fatty infiltration.                    Medications:      Scheduled Meds:     aspirin  81 mg Oral Daily    atenolol  25 mg Oral Daily    atorvastatin  40 mg Oral Daily    pantoprazole  40 mg Oral BID AC    sucralfate  1 g Oral 4x Daily AC & HS     Continuous Infusions:     0.9% sodium chloride + KCl 20 mEq/L 100 mL/hr at 01/04/24 1116     PRN Meds:  acetaminophen **OR** acetaminophen, capsaicin, diphenhydrAMINE, diphenhydrAMINE, HYDROmorphone, HYDROmorphone, metoclopramide, [START ON 1/5/2024] metoclopramide, naloxone **OR** naloxone **OR** naloxone **OR** naloxone, prochlorperazine **OR** prochlorperazine **OR** prochlorperazine, senna-docusate **OR** senna-docusate            "

## 2024-01-04 NOTE — PLAN OF CARE
ROOM # 204-1    Living Situation : Home with    Facility name:N/A  : Jose (  ) [phone]    Activity level at baseline: Independent   Activity level on admit: Independent     Who will be transporting you at discharge: Jose ()    Patient registered to observation; given Patient Bill of Rights; given the opportunity to ask questions about observation status and their plan of care.  Patient has been oriented to the observation room, bathroom and call light is in place.    Discussed discharge goals and expectations with patient/family.

## 2024-01-04 NOTE — PROVIDER NOTIFICATION
Paged Provider regarding patient requesting metoclopramide (Reglan) she takes at home ordered instead of Zofran.  Obtained metoclopramide orders.

## 2024-01-04 NOTE — PROGRESS NOTES
PRIMARY DIAGNOSIS: GASTRITIS/EPIGASTRIC PAIN    OUTPATIENT/OBSERVATION GOALS TO BE MET BEFORE DISCHARGE  1. Orthostatic performed: N/A    2. Tolerating PO fluid and/or antibiotics (if applicable): Yes    3. Nausea/Vomiting/Diarrhea symptoms improved: Yes    4. Pain status: Pain free.    5. Return to near baseline physical activity: Yes    Discharge Planner Nurse   Safe discharge environment identified: No  Barriers to discharge: Yes       Entered by: Elham Palm RN 01/03/2024 7:21 PM     Please review provider order for any additional goals.   Nurse to notify provider when observation goals have been met and patient is ready for discharge.

## 2024-01-04 NOTE — PLAN OF CARE
PRIMARY DIAGNOSIS: Epigastric pain / Nausea   OUTPATIENT/OBSERVATION GOALS TO BE MET BEFORE DISCHARGE:  1. Pain Status: Improved but still requiring IV narcotics.    2. Return to near baseline physical activity: Yes, but SBA due to medications     3. Cleared for discharge by consultants (if involved): No    Discharge Planner Nurse   Safe discharge environment identified: Yes  Barriers to discharge: No      A&Ox4. Feeling much better this afternoon. Tolerating regular diet. Goal to stick to orals for discharge planning. NS + KCI 20 mEq/L running at 100 mL/hr.   at bedside.    Authored by Resident/PA/NP

## 2024-01-04 NOTE — PLAN OF CARE
PRIMARY DIAGNOSIS: Epigastric pain / Nausea   OUTPATIENT/OBSERVATION GOALS TO BE MET BEFORE DISCHARGE:  1. Pain Status: Improved but still requiring IV narcotics.    2. Return to near baseline physical activity: Yes, but SBA due to medications     3. Cleared for discharge by consultants (if involved): No    Discharge Planner Nurse   Safe discharge environment identified: Yes  Barriers to discharge: No      A&Ox4. Giving Protonix and Carafate. Also given Zofran, Reglan and Compazine for nausea. IV dilaudid for epigastric pain. Feeling much better this afternoon. Took a nap and ordering lunch.  at bedside.

## 2024-01-04 NOTE — PLAN OF CARE
PRIMARY DIAGNOSIS: Epigastric pain / Nausea   OUTPATIENT/OBSERVATION GOALS TO BE MET BEFORE DISCHARGE:  1. Pain Status: Improved but still requiring IV narcotics.    2. Return to near baseline physical activity: Yes, but SBA due to medications     3. Cleared for discharge by consultants (if involved): No    Discharge Planner Nurse   Safe discharge environment identified: Yes  Barriers to discharge: No      A&Ox4. Giving PPI and Carafate. Also given Zofran, Reglan and Compazine for nausea. IV dilaudid for 10/10 pain. Regular diet, no appetite for food this morning.  at bedside.

## 2024-01-04 NOTE — PLAN OF CARE
"PRIMARY DIAGNOSIS: Hyperemesis , Epigastric pain and Nausea, Esophagitis , Gastritis.  OUTPATIENT/OBSERVATION GOALS TO BE MET BEFORE DISCHARGE:  ADLs back to baseline: Yes    Activity and level of assistance: Ambulating independently.    Pain status: Denies pain    Return to near baseline physical activity: Yes     Discharge Planner Nurse   Safe discharge environment identified: Yes  Barriers to discharge: Yes  A & O X 4. Pleasant. [place] sounds clear bilaterally to auscultation. Denies chills, shortness of breath, lightheadedness, nausea, vomit, or diarrhea. Bowel sound present all quads and active.  Independent in the room . Peripheral infusing NS at 100 mL. On regular diet. Positive HIDA scan on 1/3.  Patient on scheduled Protonix 2 times daily. Prn metoclopramide , and compazine available for nausea. Pain is managed with Tylenol, and Oxycodone.  C/O pain rating at 5 out of 10, prn Oxycodone given X 1. C/O nausea, prn metoclopramide administered once . Afebrile.  BP (!) 159/84 (BP Location: Left arm)   Pulse 73   Temp 98.3  F (36.8  C) (Oral)   Resp 16   Ht 1.651 m (5' 5\")   Wt 121.5 kg (267 lb 14.4 oz)   SpO2 95%   BMI 44.58 kg/m          Entered by: Chela Alegria RN 01/04/2024 06:17     Please review provider order for any additional goals.   Nurse to notify provider when observation goals have been met and patient is ready for discharge.  "

## 2024-01-04 NOTE — PROGRESS NOTES
PRIMARY DIAGNOSIS: GASTRITIS/EPIGASTRIC PAIN    OUTPATIENT/OBSERVATION GOALS TO BE MET BEFORE DISCHARGE  1. Orthostatic performed: N/A    2. Tolerating PO fluid and/or antibiotics (if applicable): Yes    3. Nausea/Vomiting/Diarrhea symptoms improved: Yes    4. Pain status: Pain free.    5. Return to near baseline physical activity: Yes    Discharge Planner Nurse   Safe discharge environment identified: No  Barriers to discharge: Yes       Entered by: Elham Palm RN 01/03/2024 7:24 PM   Pt A&O x 4. VSS. Up independently. On room air. BS+. LS clear. Denies pain, nausea or SOB. Tolerating clear liquid diet. PIV infusing NS @ 100 mL/h.   Please review provider order for any additional goals.   Nurse to notify provider when observation goals have been met and patient is ready for discharge.

## 2024-01-05 VITALS
OXYGEN SATURATION: 96 % | TEMPERATURE: 97.6 F | BODY MASS INDEX: 27.81 KG/M2 | DIASTOLIC BLOOD PRESSURE: 84 MMHG | WEIGHT: 166.89 LBS | HEIGHT: 65 IN | SYSTOLIC BLOOD PRESSURE: 149 MMHG | RESPIRATION RATE: 18 BRPM | HEART RATE: 73 BPM

## 2024-01-05 LAB
PATH REPORT.ADDENDUM SPEC: NORMAL
PATH REPORT.COMMENTS IMP SPEC: NORMAL
PATH REPORT.COMMENTS IMP SPEC: NORMAL
PATH REPORT.FINAL DX SPEC: NORMAL
PATH REPORT.GROSS SPEC: NORMAL
PATH REPORT.MICROSCOPIC SPEC OTHER STN: NORMAL
PATH REPORT.RELEVANT HX SPEC: NORMAL
PHOTO IMAGE: NORMAL

## 2024-01-05 PROCEDURE — 250N000013 HC RX MED GY IP 250 OP 250 PS 637: Performed by: HOSPITALIST

## 2024-01-05 PROCEDURE — 250N000013 HC RX MED GY IP 250 OP 250 PS 637: Performed by: INTERNAL MEDICINE

## 2024-01-05 PROCEDURE — 99239 HOSP IP/OBS DSCHRG MGMT >30: CPT | Performed by: HOSPITALIST

## 2024-01-05 PROCEDURE — 250N000011 HC RX IP 250 OP 636: Performed by: HOSPITALIST

## 2024-01-05 RX ORDER — AMOXICILLIN 250 MG
1 CAPSULE ORAL 2 TIMES DAILY PRN
Qty: 20 TABLET | Refills: 0 | Status: SHIPPED | OUTPATIENT
Start: 2024-01-05 | End: 2024-01-19

## 2024-01-05 RX ORDER — ACETAMINOPHEN 325 MG/1
650 TABLET ORAL EVERY 4 HOURS PRN
COMMUNITY
Start: 2024-01-05

## 2024-01-05 RX ORDER — PANTOPRAZOLE SODIUM 40 MG/1
40 TABLET, DELAYED RELEASE ORAL
Qty: 60 TABLET | Refills: 1 | Status: SHIPPED | OUTPATIENT
Start: 2024-01-05

## 2024-01-05 RX ORDER — PROCHLORPERAZINE MALEATE 5 MG
5 TABLET ORAL EVERY 6 HOURS PRN
Qty: 20 TABLET | Refills: 0 | Status: SHIPPED | OUTPATIENT
Start: 2024-01-05

## 2024-01-05 RX ORDER — HYDROMORPHONE HYDROCHLORIDE 4 MG/1
4 TABLET ORAL EVERY 6 HOURS PRN
Qty: 10 TABLET | Refills: 0 | Status: SHIPPED | OUTPATIENT
Start: 2024-01-05

## 2024-01-05 RX ORDER — SUCRALFATE ORAL 1 G/10ML
1 SUSPENSION ORAL
Qty: 414 ML | Refills: 0 | Status: SHIPPED | OUTPATIENT
Start: 2024-01-05

## 2024-01-05 RX ORDER — PROCHLORPERAZINE 25 MG
25 SUPPOSITORY, RECTAL RECTAL EVERY 12 HOURS PRN
Qty: 20 SUPPOSITORY | Refills: 0 | Status: SHIPPED | OUTPATIENT
Start: 2024-01-05

## 2024-01-05 RX ADMIN — ASPIRIN 81 MG: 81 TABLET, COATED ORAL at 07:54

## 2024-01-05 RX ADMIN — ATORVASTATIN CALCIUM 40 MG: 40 TABLET, FILM COATED ORAL at 07:54

## 2024-01-05 RX ADMIN — ENOXAPARIN SODIUM 40 MG: 40 INJECTION SUBCUTANEOUS at 04:24

## 2024-01-05 RX ADMIN — ATENOLOL 25 MG: 25 TABLET ORAL at 07:54

## 2024-01-05 RX ADMIN — PANTOPRAZOLE SODIUM 40 MG: 40 TABLET, DELAYED RELEASE ORAL at 07:54

## 2024-01-05 RX ADMIN — SUCRALFATE 1 G: 1 SUSPENSION ORAL at 07:54

## 2024-01-05 RX ADMIN — PROCHLORPERAZINE MALEATE 5 MG: 5 TABLET ORAL at 07:54

## 2024-01-05 RX ADMIN — ACETAMINOPHEN 650 MG: 325 TABLET, FILM COATED ORAL at 04:24

## 2024-01-05 ASSESSMENT — ACTIVITIES OF DAILY LIVING (ADL)
ADLS_ACUITY_SCORE: 20

## 2024-01-05 NOTE — PLAN OF CARE
"[id number]    Inpatient Progress Note:    A & O X 4. Pleasant. [place] sounds clear bilaterally to auscultation. Denies chills, shortness of breath, lightheadedness,  vomit, or diarrhea. C/O nausea, prn compazine administered once. Bowel sound present all quads and active.  Independent in the room . Peripheral infusing 0.9% Sodium Chloride + kcl 20 mEq at 100 mL. On regular diet.  Admitting DX: Epigastric pain, Nausea, Esophagitis, and Gastritis. Patient on scheduled Protonix 2 times daily, Sucralfate, Lovenox. Prn metoclopramide , and compazine available for nausea. Pain is managed with Tylenol, and Dilaudid.  Prn Benadryl available for itching . Afebrile. GI following.  /76 (BP Location: Left arm)   Pulse 64   Temp 98.2  F (36.8  C) (Oral)   Resp 18   Ht 1.651 m (5' 5\")   Wt 121.5 kg (267 lb 14.4 oz)   SpO2 96%   BMI 44.58 kg/m     Will continue to monitor provide supportive cares.   Chela Alegria RN       "

## 2024-01-05 NOTE — DISCHARGE SUMMARY
"[facility]  Hospitalist Discharge Summary      Date of Admission:  1/2/2024  Date of Discharge:  1/5/2024  Discharging Provider: Randal Ashby MD  Discharge Service: Hospitalist Service    Discharge Diagnoses   Epigastric pain  Nausea and vomiting  Gastritis, esophagitis  Possible biliary dyskinesia    Clinically Significant Risk Factors     # Overweight: Estimated body mass index is 27.77 kg/m  as calculated from the following:    Height as of this encounter: 1.651 m (5' 5\").    Weight as of this encounter: 75.7 kg (166 lb 14.2 oz).       Follow-ups Needed After Discharge   Follow-up Appointments     Follow-up and recommended labs and tests       Follow up with primary care provider, [facility],   within 7 days for hospital follow- up.  No follow up labs or test are   needed.  Follow-up with surgery to discuss HIDA scan results and possible need for   cholecystectomy (gallbladder removal)          Unresulted Labs Ordered in the Past 30 Days of this Admission       No orders found from 12/3/2023 to 1/3/2024.        These results will be followed up by NA    Discharge Disposition   Discharged to home  Condition at discharge: Stable    Hospital Course   58-year-old female who is a daily cannabis user who presents to the hospital today for concerns about epigastric pain and nausea.  This is the patient's third visit to the ER in the past 1 week.  Her previous evaluations in the ER included negative troponin enzymes and chest/abdomen/pelvis CT scan showing no acute findings     The patient has no history of peptic ulcer disease.  No history of gallbladder disease.  Intermittent use of Aleve.  Chronic daily THC user since August 2022     In the ER this evening, vital signs notable for systolic blood pressure near 175.  Heart rate 70.  No fever.  No hypoxia     Imaging this even included CT scan of the abdomen pelvis showing no acute findings.  Abdominal ultrasound was done " showing fatty infiltration of the liver otherwise no acute findings.  No gallstones.    Epigastric pain  Esophagitis  Gastritis    - EGD shows esophagitis, and gastritis     - cont oral PPI BID    - needs to stop NSAID use    - had another episode this am of pain/n/v    - will try and transition her to oral meds to prepare for discharge    - feeling better this afternoon, advance diet    - cont IVF until we are sure she is tolerating PO    Possible biliary dyskinesia    - HIDA results shows possible biliary dyskinesia    - outpatient follow-up     HTN    - cont atenolol     HLP    - cont lipitor     Morbid obesity     - complicates care     History of depression/psychosis bipolar 1 disorder    - not on medications     CAD    - s/p ROSA to OM2 5/27/2021 (70% significant stenosis), resume aspirin tomorrow    Patient tolerated clear liquids yesterday afternoon and then ate a dinner.  She is eating breakfast today.  No further nausea vomiting or abdominal pain.  She will discharge home.  I will send her with Protonix twice a day, Carafate, oral and rectal Compazine.  I will send her home with a small supply of oral Dilaudid.  We discussed the plan of treating her gastritis and esophagitis for at least 2 weeks with the above medications.  After that time if she still having issues with abdominal pain, she can talk to surgery about her possible biliary dyskinesia seen on her HIDA scan.  I did put in a surgical consult so she can meet with them as an outpatient.  We also discussed the possibility of her daily marijuana use as a source of her symptoms.  Patient will discharge home.    Consultations This Hospital Stay   GASTROENTEROLOGY IP CONSULT    Code Status   Full Code    Time Spent on this Encounter   I, Randal Ashby MD, personally saw the patient today and spent greater than 30 minutes discharging this patient.       Randal Ashby MD  [facility] OBSERVATION DEPT  [address] [place]  [address]  Phone: [phone]  ______________________________________________________________________    Physical Exam   Vital Signs: Temp: 98.2  F (36.8  C) Temp src: Oral BP: 134/73 Pulse: 68   Resp: 16 SpO2: 96 % O2 Device: None (Room air)    Weight: 166 lbs 14.21 oz  Constitutional: awake, alert, cooperative, no apparent distress, and appears stated age  Eyes: Lids and lashes normal, pupils equal, round and reactive to light, extra ocular muscles intact, sclera clear, conjunctiva normal  ENT: Normocephalic, without obvious abnormality, atraumatic, sinuses nontender on palpation, external ears without lesions, oral pharynx with moist mucous membranes, tonsils without erythema or exudates, gums normal and good dentition.       Primary Care Physician   [facility]    Discharge Orders      Adult General Surg Referral      Reason for your hospital stay    Abdominal pain, nausea and vomiting  Esophagitis, gastritis  Possible biliary dyskinesia     Follow-up and recommended labs and tests     Follow up with primary care provider, [facility], within 7 days for hospital follow- up.  No follow up labs or test are needed.  Follow-up with surgery to discuss HIDA scan results and possible need for cholecystectomy (gallbladder removal)     Activity    Your activity upon discharge: activity as tolerated     Discharge Instructions    Avoid NSAIDs (non-steroidal anti-inflammatory medications). These include: Motrin, ibuprofen, Advil, Aleve, naprosyn.     Diet    Follow this diet upon discharge: Advance to a regular diet as tolerated (low fat)       Significant Results and Procedures   Most Recent 3 CBC's:  Recent Labs   Lab Test 01/03/24  1540 01/02/24  1633 12/31/23  1031   WBC 7.2 8.1 7.7   HGB 13.5 15.7 14.6   MCV 95 94 95    285 262     Most Recent 3 BMP's:  Recent Labs   Lab Test 01/03/24  1705 01/03/24  1540 01/02/24  1633 12/31/23  1031   NA  --  139 135 135   POTASSIUM   --  3.4 3.8 3.8   CHLORIDE  --  105 98 100   CO2  --  22 22 18*   BUN  --  8.6 9.9 15.8   CR  --  0.77 0.82 0.83   ANIONGAP  --  12 15 17*   WENDI  --  8.4* 9.6 9.2   * 95 139* 155*     Most Recent 2 LFT's:  Recent Labs   Lab Test 01/03/24  1540 01/02/24  1633   AST 27 32   ALT 24 28   ALKPHOS 59 79   BILITOTAL 1.0 1.1   ,   Results for orders placed or performed during the hospital encounter of 01/02/24   CT Abdomen Pelvis w Contrast    Narrative    EXAM: CT ABDOMEN PELVIS W CONTRAST  LOCATION: [facility]  DATE: 1/2/2024    INDICATION: abdominal pain and vomiting  COMPARISON: Right upper quadrant ultrasound 01/02/2024  TECHNIQUE: CT scan of the abdomen and pelvis was performed following injection of IV contrast. Multiplanar reformats were obtained. Dose reduction techniques were used.  CONTRAST: 100mL Isovue 370    FINDINGS:   LOWER CHEST: Normal.    HEPATOBILIARY: Normal.    PANCREAS: Normal.    SPLEEN: Normal.    ADRENAL GLANDS: Normal.    KIDNEYS/BLADDER: 1.5 x 0.6 cm left renal cyst. No specific follow-up necessary for this. Bilateral kidneys are otherwise within normal limits.    BOWEL: No obstruction or inflammatory change. Moderate, left colonic diverticulosis.    LYMPH NODES: Normal.    VASCULATURE: Mild calcified atherosclerotic changes. No abdominal aortic aneurysm. Retroverted left renal vein.    PELVIC ORGANS: Normal.    MUSCULOSKELETAL: Hypertrophic changes, visualized spine. Degenerative changes, lower lumbar facet joints. 2.2 x 1.3 cm peripherally enhancing fluid collection within the umbilicus (image #124 series 3).      Impression    IMPRESSION:   1.  No acute findings to explain the patient's abdominal symptoms.   US Abdomen Limited    Narrative    EXAM: US ABDOMEN LIMITED  LOCATION: [facility]  DATE: 1/2/2024    INDICATION: abdominal pain and vomiting  COMPARISON: CT abdomen and pelvis 01/02/2024  TECHNIQUE: Limited abdominal  ultrasound.    FINDINGS:    GALLBLADDER: Normal. No gallstones, wall thickening, or pericholecystic fluid. Negative sonographic Rota's sign.    BILE DUCTS: No biliary dilatation. The common duct measures 2 mm.    LIVER: Heterogeneous increased hepatic echogenicity. No focal mass.    RIGHT KIDNEY: 10.1 cm in length. Adequate cortical thickness and echogenicity. No hydronephrosis.    PANCREAS: Head, neck and proximal pancreatic body are within normal limits. Remainder the pancreas not seen due to overlying bowel gas.    No ascites.      Impression    IMPRESSION:  1.  Heterogeneous hepatic echogenicity most likely representing diffuse fatty infiltration.       NM Hepatobiliary Scan with GB EF and/or Pharm    Narrative    EXAM: NM HEPATOBILIARY SCAN WITH GB EF AND/OR PHARM  LOCATION: [facility]  DATE: 1/3/2024    INDICATION: epigastric pain, nausea  COMPARISON: CT abdomen and pelvis, abdominal ultrasound 01/02/2024  TECHNIQUE: 7.2 mCi of technetium-99m mebrofenin, IV. Anterior planar imaging of the abdomen. 2.4 mcg of cholecystokinin analog, IV. Gallbladder imaging for 30-60 minutes.    FINDINGS: Normal radionuclide activity in liver, gallbladder, bile ducts, and small bowel. No evidence of cystic or common duct obstruction or intrinsic liver disease. Gallbladder ejection fraction measures 7% (Normal range = 35% or greater).      Impression    IMPRESSION:     Findings suspicious for biliary dyskinesia.       Discharge Medications   Current Discharge Medication List        START taking these medications    Details   acetaminophen (TYLENOL) 325 MG tablet Take 2 tablets (650 mg) by mouth every 4 hours as needed for mild pain or other (and adjunct with moderate or severe pain or per patient request)    Associated Diagnoses: Abdominal pain, epigastric      HYDROmorphone (DILAUDID) 4 MG tablet Take 1 tablet (4 mg) by mouth every 6 hours as needed for severe pain  Qty: 10 tablet, Refills: 0     Associated Diagnoses: Abdominal pain, epigastric      pantoprazole (PROTONIX) 40 MG EC tablet Take 1 tablet (40 mg) by mouth 2 times daily (before meals)  Qty: 60 tablet, Refills: 1    Associated Diagnoses: Gastritis without bleeding, unspecified chronicity, unspecified gastritis type; Gastroesophageal reflux disease with esophagitis, unspecified whether hemorrhage      prochlorperazine (COMPAZINE) 25 MG suppository Place 1 suppository (25 mg) rectally every 12 hours as needed for nausea or vomiting  Qty: 20 suppository, Refills: 0    Associated Diagnoses: Intractable vomiting      prochlorperazine (COMPAZINE) 5 MG tablet Take 1 tablet (5 mg) by mouth every 6 hours as needed for nausea or vomiting  Qty: 20 tablet, Refills: 0    Associated Diagnoses: Intractable vomiting      senna-docusate (SENOKOT-S/PERICOLACE) 8.6-50 MG tablet Take 1 tablet by mouth 2 times daily as needed for constipation  Qty: 20 tablet, Refills: 0    Associated Diagnoses: Constipation, unspecified constipation type      sucralfate (CARAFATE) 1 GM/10ML suspension Take 10 mLs (1 g) by mouth 4 times daily (before meals and nightly)  Qty: 414 mL, Refills: 0    Associated Diagnoses: Gastritis without bleeding, unspecified chronicity, unspecified gastritis type; Gastroesophageal reflux disease with esophagitis, unspecified whether hemorrhage           CONTINUE these medications which have NOT CHANGED    Details   aspirin 81 MG EC tablet Take 81 mg by mouth daily      atenolol (TENORMIN) 25 MG tablet Take 25 mg by mouth daily      atorvastatin (LIPITOR) 40 MG tablet Take 40 mg by mouth daily      diphenhydrAMINE (BENADRYL) 25 MG capsule Take 25 mg by mouth 3 times daily as needed for itching or allergies Takes with Reglan*      medical cannabis (Patient's own supply) Take 1 Dose by mouth See Admin Instructions (The purpose of this order is to document that the patient reports taking medical cannabis.  This is not a prescription, and is not used to  certify that the patient has a qualifying medical condition.)      metoclopramide (REGLAN) 5 MG tablet Take 1 tablet (5 mg) by mouth 3 times daily as needed  Qty: 15 tablet, Refills: 0           STOP taking these medications       HYDROcodone-acetaminophen (NORCO) 5-325 MG tablet Comments:   Reason for Stopping:             Allergies   Allergies   Allergen Reactions    Zofran [Ondansetron] Nausea and Vomiting     Self reported

## 2024-01-08 ENCOUNTER — PATIENT OUTREACH (OUTPATIENT)
Dept: CARE COORDINATION | Facility: CLINIC | Age: 59
End: 2024-01-08
Payer: COMMERCIAL

## 2024-01-08 NOTE — PROGRESS NOTES
"  [facility]: [facility]: Post-Discharge Note  SITUATION                                                      Admission:    Admission Date: 01/02/24   Reason for Admission: Abdominal pain, nausea and vomiting  Esophagitis, gastritis  Possible biliary dyskinesia  Discharge:   Discharge Date: 01/05/24  Discharge Diagnosis: Abdominal pain, nausea and vomiting  Esophagitis, gastritis  Possible biliary dyskinesia    BACKGROUND                                                      Per hospital discharge summary and inpatient provider notes:    58-year-old female who is a daily cannabis user who presents to the hospital today for concerns about epigastric pain and nausea.  This is the patient's third visit to the ER in the past 1 week.  Her previous evaluations in the ER included negative troponin enzymes and chest/abdomen/pelvis CT scan showing no acute findings     The patient has no history of peptic ulcer disease.  No history of gallbladder disease.  Intermittent use of Aleve.  Chronic daily THC user since August 2022     In the ER this evening, vital signs notable for systolic blood pressure near 175.  Heart rate 70.  No fever.  No hypoxia     Imaging this even included CT scan of the abdomen pelvis showing no acute findings.  Abdominal ultrasound was done showing fatty infiltration of the liver otherwise no acute findings.  No gallstones.    ASSESSMENT           Discharge Assessment  How are you doing now that you are home?: \"I'm doing good, I've went through my instructions and I'm doing well\"  How are your symptoms? (Red Flag symptoms escalate to triage hotline per guidelines): Improved  Do you feel your condition is stable enough to be safe at home until your provider visit?: Yes  Does the patient have their discharge instructions? : Yes  Does the patient have questions regarding their discharge instructions? : No  Were you started on any new medications or were there changes to any of " your previous medications? : Yes  Does the patient have all of their medications?: Yes  Do you have questions regarding any of your medications? : No  Do you have all of your needed medical supplies or equipment (DME)?  (i.e. oxygen tank, CPAP, cane, etc.): Yes  Discharge follow-up appointment scheduled within 14 calendar days? : Yes  Discharge Follow Up Appointment Date: 01/09/24  Discharge Follow Up Appointment Scheduled with?: Specialty Care Provider                  PLAN                                                      Outpatient Plan:  Follow-up Appointments     Follow-up and recommended labs and tests       Follow up with primary care provider, [facility],   within 7 days for hospital follow- up.  No follow up labs or test are   needed.  Follow-up with surgery to discuss HIDA scan results and possible need for   cholecystectomy (gallbladder removal)      Future Appointments   Date Time Provider Department Center   1/9/2024  2:00 PM Thomas Trevino MD [facility]         For any urgent concerns, please contact our 24 hour nurse triage line: [phone] ([phone])         Lili Pascal  Community Health Worker  Connected Care [facility]  [phone]

## 2024-01-09 ENCOUNTER — OFFICE VISIT (OUTPATIENT)
Dept: SURGERY | Facility: CLINIC | Age: 59
End: 2024-01-09
Payer: COMMERCIAL

## 2024-01-09 VITALS
SYSTOLIC BLOOD PRESSURE: 138 MMHG | DIASTOLIC BLOOD PRESSURE: 90 MMHG | BODY MASS INDEX: 27.66 KG/M2 | HEIGHT: 65 IN | WEIGHT: 166 LBS | HEART RATE: 69 BPM | OXYGEN SATURATION: 96 %

## 2024-01-09 DIAGNOSIS — K82.8 BILIARY DYSKINESIA: Primary | ICD-10-CM

## 2024-01-09 PROCEDURE — 99204 OFFICE O/P NEW MOD 45 MIN: CPT | Performed by: SURGERY

## 2024-01-09 RX ORDER — INDOCYANINE GREEN AND WATER 25 MG
2.5 KIT INJECTION ONCE
Status: CANCELLED | OUTPATIENT
Start: 2024-01-09 | End: 2024-01-09

## 2024-01-09 NOTE — PROGRESS NOTES
Chief complaint:  Abdominal pain and nausea.    HPI:  This patient is a 58 year old female who presents with episodic upper abdominal pain and nausea over the last week or so.  The patient was seen in the emergency room 3 times and was eventually admitted to the hospital.  She underwent an EGD, which revealed gastritis and esophagitis.  She eventually underwent a HIDA scan, which revealed a gallbladder ejection fraction of 7%.  The patient is feeling a bit better, but continues to have pain and nausea.  She has been on twice daily proton pump inhibitors for about a week.    Past Medical History:   has a past medical history of Depression and Old myocardial infarction (2021).    Past Surgical History:  Past Surgical History:   Procedure Laterality Date    ESOPHAGOSCOPY, GASTROSCOPY, DUODENOSCOPY (EGD), COMBINED N/A 1/3/2024    Procedure: Esophagoscopy, gastroscopy, duodenoscopy (EGD), combined with biopsies using biopsy forceps;  Surgeon: Isaac Torrez MD;  Location: [facility]    STENT      [place] teeth extraction          Social History:  Social History     Socioeconomic History    Marital status:      Spouse name: Not on file    Number of children: Not on file    Years of education: Not on file    Highest education level: Not on file   Occupational History    Not on file   Tobacco Use    Smoking status: Former     Passive exposure: Never    Smokeless tobacco: Never   Vaping Use    Vaping Use: Every day   Substance and Sexual Activity    Alcohol use: Yes     Alcohol/week: 1.0 standard drink of alcohol     Types: 1 Shots of liquor per week     Comment: socially once a month    Drug use: Yes     Comment: Vaping - THC    Sexual activity: Yes     Partners: Male   Other Topics Concern    Not on file   Social History Narrative    Not on file     Social Determinants of Health     Financial Resource Strain: Not on file   Food Insecurity: Not on file   Transportation Needs: Not on file   Physical Activity: Not on file    Stress: Not on file   Social Connections: Not on file   Interpersonal Safety: Not on file   Housing Stability: Not on file        Family History:  Family History   Problem Relation Age of Onset    Colon Cancer Mother     Breast Cancer Paternal Grandmother        Review of Systems:  The 10 point Review of Systems is negative other than noted in the HPI and above.    Physical Exam:  General - This is a well developed, well nourished female in no apparent distress.  HEENT - Normocephalic, atraumatic.  No scleral icterus.  Neck - supple without masses  Lungs - clear to ascultation.    Heart - Regular rate & rhythm without murmur  Abdomen:   soft, non-distended with tenderness noted in the right upper quadrant . no masses palpated. normal bowel sounds.  Extremities - warm without edema  Neurologic - nonfocal    Relevant labs:  Normal liver function tests.  Lipase 155 on hospital admission.    Imaging:  Ultrasound shows no evidence of gallstones or gallbladder wall thickening.  CT scan showed no acute findings.  HIDA scan revealed a gallbladder ejection fraction of 7%, consistent with biliary dyskinesia.    Assessment and Plan:  This is a patient with upper abdominal symptoms, severe nausea, and a significantly decreased gallbladder ejection fraction on HIDA.  I have recommended Laparoscopic cholecystectomy.  We discussed the procedure, along with its risks and complications, in detail.  We discussed the possibility that some or all of the symptoms may not be due to her gallbladder.  She agrees to proceed.  Will work on getting this scheduled for her in the near future.    A total of 45 minutes was spent today in chart review, patient examination and discussion, and documentation.    Thomas Trevino MD  [facility]    Please route or send letter to:  Primary Care Provider (PCP)

## 2024-01-09 NOTE — LETTER
January 10, 2024          Randal Ashby MD  [address] [place] [address]      RE:   Radha James 1965      Dear Colleague,    Thank you for referring your patient, Radha James, to [facility] at [place] location. Please see a copy of my visit note below.    Radha to follow up with Primary Care provider regarding elevated blood pressure.      Chief complaint:  Abdominal pain and nausea.    HPI:  This patient is a 58 year old female who presents with episodic upper abdominal pain and nausea over the last week or so.  The patient was seen in the emergency room 3 times and was eventually admitted to the hospital.  She underwent an EGD, which revealed gastritis and esophagitis.  She eventually underwent a HIDA scan, which revealed a gallbladder ejection fraction of 7%.  The patient is feeling a bit better, but continues to have pain and nausea.  She has been on twice daily proton pump inhibitors for about a week.    Past Medical History:   has a past medical history of Depression and Old myocardial infarction (2021).    Review of Systems:  The 10 point Review of Systems is negative other than noted in the HPI and above.    Physical Exam:  General - This is a well developed, well nourished female in no apparent distress.  HEENT - Normocephalic, atraumatic.  No scleral icterus.  Neck - supple without masses  Lungs - clear to ascultation.      Heart - Regular rate & rhythm without murmur  Abdomen:   soft, non-distended with tenderness noted in the right upper quadrant . no masses palpated. normal bowel sounds.  Extremities - warm without edema  Neurologic - nonfocal    Relevant labs:  Normal liver function tests.  Lipase 155 on hospital admission.    Imaging:  Ultrasound shows no evidence of gallstones or gallbladder wall thickening.  CT scan showed no acute findings.  HIDA scan revealed a gallbladder ejection fraction of 7%, consistent with biliary dyskinesia.    Assessment and  Plan:  This is a patient with upper abdominal symptoms, severe nausea, and a significantly decreased gallbladder ejection fraction on HIDA.  I have recommended Laparoscopic cholecystectomy.  We discussed the procedure, along with its risks and complications, in detail.  We discussed the possibility that some or all of the symptoms may not be due to her gallbladder.  She agrees to proceed.  Will work on getting this scheduled for her in the near future.    A total of 45 minutes was spent today in chart review, patient examination and discussion, and documentation.    Again, thank you for allowing me to participate in the care of your patient.      Sincerely,      MD SUMEET Rich/radha      D: 1/10/2024  T: 8:26 am

## 2024-01-10 ENCOUNTER — TELEPHONE (OUTPATIENT)
Dept: SURGERY | Facility: CLINIC | Age: 59
End: 2024-01-10
Payer: COMMERCIAL

## 2024-01-10 NOTE — TELEPHONE ENCOUNTER
Type of surgery: LAPAROSCOPIC CHOLECYSTECTOMY   Location of surgery: [facility]  Date and time of surgery: 1-29-24, 10:15 AM  Surgeon: DR TREJO   Pre-Op Appt Date: PATIENT TO SCHEDULE   Post-Op Appt Date: NA    Packet sent out: Yes  Pre-cert/Authorization completed:  Not Applicable  Date: 1-10-24      LAPAROSCOPIC CHOLECYSTECTOMY GENERAL PT INST TO HAVE H&P 60 MINS REQ PA ASSIST [facility]   (75 mins average)

## 2024-01-19 RX ORDER — AMLODIPINE BESYLATE 5 MG/1
5 TABLET ORAL DAILY
COMMUNITY
Start: 2024-01-11 | End: 2025-01-10

## 2024-01-29 ENCOUNTER — ANESTHESIA (OUTPATIENT)
Dept: SURGERY | Facility: CLINIC | Age: 59
End: 2024-01-29
Payer: COMMERCIAL

## 2024-01-29 ENCOUNTER — HOSPITAL ENCOUNTER (OUTPATIENT)
Facility: CLINIC | Age: 59
Discharge: HOME | End: 2024-01-29
Attending: SURGERY | Admitting: SURGERY
Payer: COMMERCIAL

## 2024-01-29 ENCOUNTER — ANESTHESIA EVENT (OUTPATIENT)
Dept: SURGERY | Facility: CLINIC | Age: 59
End: 2024-01-29
Payer: COMMERCIAL

## 2024-01-29 ENCOUNTER — APPOINTMENT (OUTPATIENT)
Dept: SURGERY | Facility: PHYSICIAN GROUP | Age: 59
End: 2024-01-29
Payer: COMMERCIAL

## 2024-01-29 VITALS
OXYGEN SATURATION: 97 % | RESPIRATION RATE: 14 BRPM | TEMPERATURE: 97.6 F | WEIGHT: 263.3 LBS | HEIGHT: 65 IN | DIASTOLIC BLOOD PRESSURE: 80 MMHG | HEART RATE: 70 BPM | SYSTOLIC BLOOD PRESSURE: 143 MMHG | BODY MASS INDEX: 43.87 KG/M2

## 2024-01-29 DIAGNOSIS — K82.8 BILIARY DYSKINESIA: ICD-10-CM

## 2024-01-29 PROCEDURE — 258N000003 HC RX IP 258 OP 636: Performed by: NURSE ANESTHETIST, CERTIFIED REGISTERED

## 2024-01-29 PROCEDURE — 250N000009 HC RX 250: Performed by: SURGERY

## 2024-01-29 PROCEDURE — 999N000141 HC STATISTIC PRE-PROCEDURE NURSING ASSESSMENT: Performed by: SURGERY

## 2024-01-29 PROCEDURE — 250N000011 HC RX IP 250 OP 636: Performed by: NURSE ANESTHETIST, CERTIFIED REGISTERED

## 2024-01-29 PROCEDURE — 250N000025 HC SEVOFLURANE, PER MIN: Performed by: SURGERY

## 2024-01-29 PROCEDURE — 88304 TISSUE EXAM BY PATHOLOGIST: CPT | Mod: TC | Performed by: SURGERY

## 2024-01-29 PROCEDURE — 370N000017 HC ANESTHESIA TECHNICAL FEE, PER MIN: Performed by: SURGERY

## 2024-01-29 PROCEDURE — 47562 LAPAROSCOPIC CHOLECYSTECTOMY: CPT | Performed by: SURGERY

## 2024-01-29 PROCEDURE — 258N000001 HC RX 258: Performed by: SURGERY

## 2024-01-29 PROCEDURE — 710N000009 HC RECOVERY PHASE 1, LEVEL 1, PER MIN: Performed by: SURGERY

## 2024-01-29 PROCEDURE — 250N000011 HC RX IP 250 OP 636: Performed by: SURGERY

## 2024-01-29 PROCEDURE — 258N000003 HC RX IP 258 OP 636: Performed by: ANESTHESIOLOGY

## 2024-01-29 PROCEDURE — 360N000076 HC SURGERY LEVEL 3, PER MIN: Performed by: SURGERY

## 2024-01-29 PROCEDURE — 250N000009 HC RX 250: Performed by: ANESTHESIOLOGY

## 2024-01-29 PROCEDURE — 250N000013 HC RX MED GY IP 250 OP 250 PS 637: Performed by: ANESTHESIOLOGY

## 2024-01-29 PROCEDURE — 272N000001 HC OR GENERAL SUPPLY STERILE: Performed by: SURGERY

## 2024-01-29 PROCEDURE — 47562 LAPAROSCOPIC CHOLECYSTECTOMY: CPT | Mod: AS | Performed by: PHYSICIAN ASSISTANT

## 2024-01-29 PROCEDURE — 710N000012 HC RECOVERY PHASE 2, PER MINUTE: Performed by: SURGERY

## 2024-01-29 PROCEDURE — 250N000009 HC RX 250: Performed by: NURSE ANESTHETIST, CERTIFIED REGISTERED

## 2024-01-29 RX ORDER — LIDOCAINE HYDROCHLORIDE 20 MG/ML
INJECTION, SOLUTION INFILTRATION; PERINEURAL PRN
Status: DISCONTINUED | OUTPATIENT
Start: 2024-01-29 | End: 2024-01-29

## 2024-01-29 RX ORDER — CEFAZOLIN SODIUM/WATER 2 G/20 ML
2 SYRINGE (ML) INTRAVENOUS
Status: COMPLETED | OUTPATIENT
Start: 2024-01-29 | End: 2024-01-29

## 2024-01-29 RX ORDER — FENTANYL CITRATE 50 UG/ML
INJECTION, SOLUTION INTRAMUSCULAR; INTRAVENOUS PRN
Status: DISCONTINUED | OUTPATIENT
Start: 2024-01-29 | End: 2024-01-29

## 2024-01-29 RX ORDER — OXYCODONE HYDROCHLORIDE 5 MG/1
5 TABLET ORAL
Status: DISCONTINUED | OUTPATIENT
Start: 2024-01-29 | End: 2024-01-29 | Stop reason: HOSPADM

## 2024-01-29 RX ORDER — SCOLOPAMINE TRANSDERMAL SYSTEM 1 MG/1
1 PATCH, EXTENDED RELEASE TRANSDERMAL ONCE
Status: DISCONTINUED | OUTPATIENT
Start: 2024-01-29 | End: 2024-01-29 | Stop reason: HOSPADM

## 2024-01-29 RX ORDER — INDOCYANINE GREEN AND WATER 25 MG
2.5 KIT INJECTION ONCE
Status: COMPLETED | OUTPATIENT
Start: 2024-01-29 | End: 2024-01-29

## 2024-01-29 RX ORDER — KETOROLAC TROMETHAMINE 30 MG/ML
INJECTION, SOLUTION INTRAMUSCULAR; INTRAVENOUS PRN
Status: DISCONTINUED | OUTPATIENT
Start: 2024-01-29 | End: 2024-01-29

## 2024-01-29 RX ORDER — ONDANSETRON 2 MG/ML
4 INJECTION INTRAMUSCULAR; INTRAVENOUS EVERY 30 MIN PRN
Status: DISCONTINUED | OUTPATIENT
Start: 2024-01-29 | End: 2024-01-29 | Stop reason: HOSPADM

## 2024-01-29 RX ORDER — ONDANSETRON 4 MG/1
4 TABLET, ORALLY DISINTEGRATING ORAL EVERY 30 MIN PRN
Status: DISCONTINUED | OUTPATIENT
Start: 2024-01-29 | End: 2024-01-29 | Stop reason: HOSPADM

## 2024-01-29 RX ORDER — LIDOCAINE 40 MG/G
CREAM TOPICAL
Status: DISCONTINUED | OUTPATIENT
Start: 2024-01-29 | End: 2024-01-29 | Stop reason: HOSPADM

## 2024-01-29 RX ORDER — ASPIRIN 81 MG/1
81 TABLET, CHEWABLE ORAL ONCE
Status: COMPLETED | OUTPATIENT
Start: 2024-01-29 | End: 2024-01-29

## 2024-01-29 RX ORDER — DEXAMETHASONE SODIUM PHOSPHATE 4 MG/ML
INJECTION, SOLUTION INTRA-ARTICULAR; INTRALESIONAL; INTRAMUSCULAR; INTRAVENOUS; SOFT TISSUE PRN
Status: DISCONTINUED | OUTPATIENT
Start: 2024-01-29 | End: 2024-01-29

## 2024-01-29 RX ORDER — OXYCODONE HYDROCHLORIDE 5 MG/1
10 TABLET ORAL
Status: DISCONTINUED | OUTPATIENT
Start: 2024-01-29 | End: 2024-01-29 | Stop reason: HOSPADM

## 2024-01-29 RX ORDER — LABETALOL HYDROCHLORIDE 5 MG/ML
10 INJECTION, SOLUTION INTRAVENOUS EVERY 5 MIN PRN
Status: DISCONTINUED | OUTPATIENT
Start: 2024-01-29 | End: 2024-01-29 | Stop reason: HOSPADM

## 2024-01-29 RX ORDER — BUPIVACAINE HYDROCHLORIDE AND EPINEPHRINE 5; 5 MG/ML; UG/ML
INJECTION, SOLUTION PERINEURAL PRN
Status: DISCONTINUED | OUTPATIENT
Start: 2024-01-29 | End: 2024-01-29 | Stop reason: HOSPADM

## 2024-01-29 RX ORDER — CEFAZOLIN SODIUM/WATER 2 G/20 ML
2 SYRINGE (ML) INTRAVENOUS SEE ADMIN INSTRUCTIONS
Status: DISCONTINUED | OUTPATIENT
Start: 2024-01-29 | End: 2024-01-29 | Stop reason: HOSPADM

## 2024-01-29 RX ORDER — HYDROMORPHONE HCL IN WATER/PF 6 MG/30 ML
0.4 PATIENT CONTROLLED ANALGESIA SYRINGE INTRAVENOUS EVERY 5 MIN PRN
Status: DISCONTINUED | OUTPATIENT
Start: 2024-01-29 | End: 2024-01-29 | Stop reason: HOSPADM

## 2024-01-29 RX ORDER — ATENOLOL 25 MG/1
25 TABLET ORAL ONCE
Status: COMPLETED | OUTPATIENT
Start: 2024-01-29 | End: 2024-01-29

## 2024-01-29 RX ORDER — HALOPERIDOL 5 MG/ML
1 INJECTION INTRAMUSCULAR
Status: DISCONTINUED | OUTPATIENT
Start: 2024-01-29 | End: 2024-01-29 | Stop reason: HOSPADM

## 2024-01-29 RX ORDER — SODIUM CHLORIDE, SODIUM LACTATE, POTASSIUM CHLORIDE, CALCIUM CHLORIDE 600; 310; 30; 20 MG/100ML; MG/100ML; MG/100ML; MG/100ML
INJECTION, SOLUTION INTRAVENOUS CONTINUOUS
Status: DISCONTINUED | OUTPATIENT
Start: 2024-01-29 | End: 2024-01-29 | Stop reason: HOSPADM

## 2024-01-29 RX ORDER — FENTANYL CITRATE 50 UG/ML
50 INJECTION, SOLUTION INTRAMUSCULAR; INTRAVENOUS EVERY 5 MIN PRN
Status: DISCONTINUED | OUTPATIENT
Start: 2024-01-29 | End: 2024-01-29 | Stop reason: HOSPADM

## 2024-01-29 RX ORDER — METOCLOPRAMIDE HYDROCHLORIDE 5 MG/ML
10 INJECTION INTRAMUSCULAR; INTRAVENOUS ONCE
Status: DISCONTINUED | OUTPATIENT
Start: 2024-01-29 | End: 2024-01-29 | Stop reason: HOSPADM

## 2024-01-29 RX ORDER — ACETAMINOPHEN 325 MG/1
975 TABLET ORAL ONCE
Status: COMPLETED | OUTPATIENT
Start: 2024-01-29 | End: 2024-01-29

## 2024-01-29 RX ORDER — HYDROMORPHONE HCL IN WATER/PF 6 MG/30 ML
0.2 PATIENT CONTROLLED ANALGESIA SYRINGE INTRAVENOUS EVERY 5 MIN PRN
Status: DISCONTINUED | OUTPATIENT
Start: 2024-01-29 | End: 2024-01-29 | Stop reason: HOSPADM

## 2024-01-29 RX ORDER — OXYCODONE HYDROCHLORIDE 5 MG/1
5-10 TABLET ORAL EVERY 4 HOURS PRN
Qty: 20 TABLET | Refills: 0 | Status: SHIPPED | OUTPATIENT
Start: 2024-01-29

## 2024-01-29 RX ORDER — PROPOFOL 10 MG/ML
INJECTION, EMULSION INTRAVENOUS PRN
Status: DISCONTINUED | OUTPATIENT
Start: 2024-01-29 | End: 2024-01-29

## 2024-01-29 RX ORDER — KETOROLAC TROMETHAMINE 15 MG/ML
15 INJECTION, SOLUTION INTRAMUSCULAR; INTRAVENOUS
Status: DISCONTINUED | OUTPATIENT
Start: 2024-01-29 | End: 2024-01-29 | Stop reason: HOSPADM

## 2024-01-29 RX ORDER — FENTANYL CITRATE 50 UG/ML
25 INJECTION, SOLUTION INTRAMUSCULAR; INTRAVENOUS EVERY 5 MIN PRN
Status: DISCONTINUED | OUTPATIENT
Start: 2024-01-29 | End: 2024-01-29 | Stop reason: HOSPADM

## 2024-01-29 RX ADMIN — SCOPALAMINE 1 PATCH: 1 PATCH, EXTENDED RELEASE TRANSDERMAL at 09:44

## 2024-01-29 RX ADMIN — SUGAMMADEX 200 MG: 100 INJECTION, SOLUTION INTRAVENOUS at 11:16

## 2024-01-29 RX ADMIN — PROPOFOL 200 MG: 10 INJECTION, EMULSION INTRAVENOUS at 10:33

## 2024-01-29 RX ADMIN — ACETAMINOPHEN 975 MG: 325 TABLET, FILM COATED ORAL at 11:50

## 2024-01-29 RX ADMIN — FENTANYL CITRATE 50 MCG: 50 INJECTION INTRAMUSCULAR; INTRAVENOUS at 10:39

## 2024-01-29 RX ADMIN — FENTANYL CITRATE 100 MCG: 50 INJECTION INTRAMUSCULAR; INTRAVENOUS at 10:31

## 2024-01-29 RX ADMIN — MIDAZOLAM 1 MG: 1 INJECTION INTRAMUSCULAR; INTRAVENOUS at 10:25

## 2024-01-29 RX ADMIN — Medication 2 G: at 10:21

## 2024-01-29 RX ADMIN — KETOROLAC TROMETHAMINE 30 MG: 30 INJECTION, SOLUTION INTRAMUSCULAR at 11:17

## 2024-01-29 RX ADMIN — SODIUM CHLORIDE, POTASSIUM CHLORIDE, SODIUM LACTATE AND CALCIUM CHLORIDE: 600; 310; 30; 20 INJECTION, SOLUTION INTRAVENOUS at 10:00

## 2024-01-29 RX ADMIN — LIDOCAINE HYDROCHLORIDE 50 MG: 20 INJECTION, SOLUTION INFILTRATION; PERINEURAL at 10:31

## 2024-01-29 RX ADMIN — INDOCYANINE GREEN AND WATER 2.5 MG: KIT at 10:00

## 2024-01-29 RX ADMIN — ASPIRIN 81 MG CHEWABLE TABLET 81 MG: 81 TABLET CHEWABLE at 09:49

## 2024-01-29 RX ADMIN — PROPOFOL 50 MCG/KG/MIN: 10 INJECTION, EMULSION INTRAVENOUS at 10:38

## 2024-01-29 RX ADMIN — ATENOLOL 25 MG: 25 TABLET ORAL at 09:46

## 2024-01-29 RX ADMIN — MIDAZOLAM 1 MG: 1 INJECTION INTRAMUSCULAR; INTRAVENOUS at 10:26

## 2024-01-29 RX ADMIN — PHENYLEPHRINE HYDROCHLORIDE 50 MCG: 10 INJECTION INTRAVENOUS at 10:51

## 2024-01-29 RX ADMIN — ROCURONIUM BROMIDE 60 MG: 50 INJECTION, SOLUTION INTRAVENOUS at 10:33

## 2024-01-29 RX ADMIN — DEXAMETHASONE SODIUM PHOSPHATE 8 MG: 4 INJECTION, SOLUTION INTRA-ARTICULAR; INTRALESIONAL; INTRAMUSCULAR; INTRAVENOUS; SOFT TISSUE at 10:34

## 2024-01-29 RX ADMIN — PROPOFOL 50 MCG/KG/MIN: 10 INJECTION, EMULSION INTRAVENOUS at 11:07

## 2024-01-29 RX ADMIN — PHENYLEPHRINE HYDROCHLORIDE 100 MCG: 10 INJECTION INTRAVENOUS at 10:44

## 2024-01-29 RX ADMIN — FENTANYL CITRATE 50 MCG: 50 INJECTION INTRAMUSCULAR; INTRAVENOUS at 10:42

## 2024-01-29 ASSESSMENT — ACTIVITIES OF DAILY LIVING (ADL)
ADLS_ACUITY_SCORE: 19
ADLS_ACUITY_SCORE: 19

## 2024-01-29 NOTE — OP NOTE
General Surgery Operative Note    Pre-operative diagnosis:  Biliary dyskinesia [K82.8]   Post-operative diagnosis: same   Procedure: Laparoscopic Cholecystectomy   Surgeon: Thomas Trevino MD   Assistant(s): Patsy Hartmann PA-C - the physician assistant was medically necessary to assist in prepping, positioning, camera operation, retraction/exposure and closure of the port site.     Anesthesia: General    Estimated blood loss: 5 cc's   Drains placed: None   Complications:  None   Findings:  Gallbladder with adhesions suggesting previous inflammation.  Very narrow caliber cystic duct.     INDICATIONS FOR OPERATION: This is a patient with upper abdominal pain and a significantly decreased gallbladder ejection fraction.  Laparoscopic cholecystectomy was recommended.  The procedure along with its risks and complications was discussed in detail and the patient agreed to proceed.    DETAILS OF THE OPERATION: After informed consent the patient was taken to the operating room where she underwent satisfactory induction of general anesthesia.  The patient was then sterilely prepped and draped.  A supraumbilical skin incision was made using a skin knife.  The dissection was carried bluntly down to the fascia.  The fascia was opened using electrocautery and the Espino trocar was then inserted.  Pneumoperitoneum was achieved using CO2 insufflation, and under direct visualization  three  5 mm upper abdominal ports were placed.  The gallbladder was visualized and was grasped.  Adhesions to the gallbladder were taken down using electrocautery.  The dome of the gallbladder was pulled up over the liver and the cystic duct was exposed.  ICG fluorescence imaging was used to aid in identification of the ductal anatomy.  The cystic duct was skeletonized, triple clipped and divided.  The cystic artery was likewise triple clipped and divided, along with any posterior branches.  The gallbladder was then dissected away from the liver  using electrocautery.  The gallbladder was then placed in an Endo Catch bag and removed through the supraumbilical incision.  The gallbladder fossa was irrigated out.  There was excellent hemostasis and the clips were in good position.  The trocar sites were now infiltrated with half percent Marcaine with epinephrine.  The trochars were removed under direct visualization.  The supraumbilical fascia was then closed using 0 Vicryl suture.  Skin incisions were closed using 4-0 subcuticular Vicryl followed by Steri-Strips.    The patient was transferred to the recovery room in satisfactory condition.  Sponge and needle counts were correct at the close of the case.     Specimens:   ID Type Source Tests Collected by Time Destination   1 : gallbladder Tissue Gallbladder SURGICAL PATHOLOGY EXAM Thomas Trevino MD 1/29/2024 11:11 AM            Thomas Trevino MD

## 2024-01-29 NOTE — ANESTHESIA CARE TRANSFER NOTE
Patient: Radha James    Procedure: Procedure(s):  LAPAROSCOPIC CHOLECYSTECTOMY       Diagnosis: Biliary dyskinesia [K82.8]  Diagnosis Additional Information: No value filed.    Anesthesia Type:   General     Note:    Oropharynx: oropharynx clear of all foreign objects and spontaneously breathing  Level of Consciousness: drowsy  Oxygen Supplementation: face mask  Level of Supplemental Oxygen (L/min / FiO2): 8  Independent Airway: airway patency satisfactory and stable  Dentition: dentition unchanged  Vital Signs Stable: post-procedure vital signs reviewed and stable  Report to RN Given: handoff report given  Patient transferred to: PACU    Handoff Report: Identifed the Patient, Identified the Reponsible Provider, Reviewed the pertinent medical history, Discussed the surgical course, Reviewed Intra-OP anesthesia mangement and issues during anesthesia, Set expectations for post-procedure period and Allowed opportunity for questions and acknowledgement of understanding      Vitals:  Vitals Value Taken Time   /77 01/29/24 1131   Temp     Pulse 83 01/29/24 1133   Resp 9 01/29/24 1133   SpO2 100 % 01/29/24 1133   Vitals shown include unfiled device data.    Electronically Signed By: LIDIA Sofia CRNA  January 29, 2024  11:34 AM

## 2024-01-29 NOTE — DISCHARGE INSTRUCTIONS
Maximum ibuprofen (Advil) dose from all sources should not exceed 2.5 grams (2,400 mg) per day. You received Toradol, an IV form of Ibuprofen (Motrin) at 11:20 AM.  Do not take any Ibuprofen products until 5:20 PM or after.     Maximum acetaminophen (Tylenol) dose from all sources should not exceed 4 grams (4000 mg) per day.  You last had 975 mg at 12pm; do not take Tylenol products again until after 6pm if needed.       HOME CARE FOLLOWING LAPAROSCOPIC CHOLECYSTECTOMY  FLORENCIA Adams, JAIDA Casanova C. Pratt, J. Shaheen    INCISIONAL CARE:  Replace the bandage over your incisions DAILY until all drainage stops, or if more comfortable to have in place.  If present, leave the steri-strips (white paper tapes) in place for 14 days after surgery.  If Dermabond (a type of skin glue) is present, leave in place until it wears/flakes off (2-3 weeks).     BATHING:  OK to shower 48 hours after surgery.  Avoid baths for 1 week after surgery.  You may wash your hair at any time.  Gently pat your incision dry after bathing.  Do not apply lotions, creams, or ointments to incisions.    ACTIVITY:  Light Activity -- you may immediately be up and about as tolerated.  Walking is encouraged, increase as tolerated.  Driving/Light Work-- when comfortable and off narcotic pain medications.  Strenuous Work/Activity -- limit lifting to 20 pounds for 2 weeks.  Progressively increase with time.  Active Sports (running, biking, etc.) -- cautiously resume after 2 weeks.    DISCOMFORT:  Local anesthetic placed at surgery should provide relief for 4-8 hours.  Begin taking pain pills before discomfort is severe.  Take the pain medication with some food, when possible, to minimize side effects.  Intermittent use of ice packs may help during the first 1-3 weeks after surgery.  Expect gradual improvement.    Over-the-counter anti-inflammatory medications (i.e. Ibuprofen/Advil/Motrin or Naprosyn/Aleve) may be used per package  instructions in addition to or while tapering off the narcotic pain medications to decrease swelling and sensitivity.  DO NOT TAKE these Anti-inflammatory medications if your primary physician has advised against doing so, or if you have acid reflux, ulcer, or bleeding disorder, or take blood-thinner medications.  Call your primary physician or the surgery office if you have medication questions.    After laparoscopic cholecystectomy, you may have shoulder or upper back discomfort due to the gas used during surgery.  This is temporary and should resolve within 2-3 days.  Frequent short walks may help with this.  You may have decreased energy level for 1-2 weeks after surgery related to your recovery.    DIET:  Start with liquids and gradually increase diet as tolerated.  Drink plenty of fluids.  While taking pain medications, consider use of a stool softener, increase your fiber in your diet, or add a fiber supplement (like Metamucil, Citrucel) to help prevent constipation - a possible side effect of pain medications.  It is not uncommon to experience some bowel changes (loose stools or constipation) after surgery.  Your body has to adapt to you no longer having a gall bladder.  To help minimize this side effect, avoid fatty foods for 1-2 weeks after surgery.  You may then slowly increase the amount of fatty foods in your diet.      NAUSEA:  If nauseated from the anesthetic/pain meds; rest in bed, get up cautiously with assistance, and drink clear liquids (juice, tea, broth).    FOLLOW-UP AFTER SURGERY:  -Our office will contact you approximately 2-3 weeks after surgery to check on your progress and answer any questions you may have.  If you are doing well, you will not need to return for an office appointment.  If any concerns are identified over the phone, we will help you make an appointment to see a provider.    -If you have not received a phone call, have any questions or concerns, or would like to be seen,  please call us at [phone].  We are located at: [address] [facility], [address]; [place] [address]    -CONTACT US IF THE FOLLOWING DEVELOPS:   1. A fever that is above 101     2. Increased redness, warmth, drainage, bleeding, or swelling.   3. Pain that is not relieved by rest/ice and your prescription.   4.  Increasing pain after 48 hours.   5. Drainage that is thick, cloudy, yellow, green or white.   6. Any other questions or concerns.      FREQUENTLY ASKED QUESTIONS:    Q:  How should my incision look?    A:  Normally your incision will appear slightly swollen with light redness directly along the incision itself as it heals.  It may feel like a bump or ridge as the healing/scarring happens, and over time (3-4 months) this bump or ridge feeling should slowly go away.  In general, clear or pink watery drainage can be normal at first as your incision heals, but should decrease over time.    Q:  How do I know if my incision is infected?  A:  Look at your incision for signs of infection, like redness around the incision spreading to surrounding skin, or drainage of cloudy or foul-smelling drainage.  If you feel warm, check your temperature to see if you are running a fever.    **If any of these things occur, please notify the nurse at our office.  We may need you to come into the office for an incision check.      Q:  How do I take care of my incision?  A:  If you have a dressing in place - Starting the day after surgery, replace the dressing 1-2 times a day until there is no further drainage from the incision.  At that time, a dressing is no longer needed.  Try to minimize tape on the skin if irritation is occurring at the tape sites.  If you have significant irritation from tape on the skin, please call the office to discuss other method of dressing your incision.    Small pieces of tape called  steri-strips  may be present directly overlying your incision; these may be removed 10 days after surgery unless  otherwise specified by your surgeon.  If these tapes start to loosen at the ends, you may trim them back until they fall off or are removed.    A:  If you had  Dermabond  tissue glue used as a dressing (this causes your incision to look shiny with a clear covering over it) - This type of dressing wears off with time and does not require more dressings over the top unless it is draining around the glue as it wears off.  Do not apply ointments or lotions over the incisions until the glue has completely worn off.    Q:  There is a piece of tape or a sticky  lead  still on my skin.  Can I remove this?  A:  Sometimes the sticky  leads  used for monitoring during surgery or for evaluation in the emergency department are not all removed while you are in the hospital.  These sometimes have a tab or metal dot on them.  You can easily remove these on your own, like taking off a band-aid.  If there is a gel substance under the  lead , simply wipe/clean it off with a washcloth or paper towel.      Q:  What can I do to minimize constipation (very hard stools, or lack of stools)?  A:  Stay well hydrated.  Increase your dietary fiber intake or take a fiber supplement -with plenty of water.  Walk around frequently.  You may consider an over-the-counter stool-softener.  Your Pharmacist can assist you with choosing one that is stocked at your pharmacy.  Constipation is also one of the most common side effects of pain medication.  If you are using pain medication, be pro-active and try to PREVENT problems with constipation by taking the steps above BEFORE constipation becomes a problem.    Q:  What do I do if I need more pain medications?  A:  Call the office to receive refills.  Be aware that certain pain meds cannot be called into a pharmacy and actually require a paper prescription.  A change may be made in your pain med as you progress thru your recovery period or if you have side effects to certain meds.    --Pain meds are NOT  refilled after 5pm on weekdays, and NOT AT ALL on the weekends, so please look ahead to prevent problems.      Q:  Why am I having a hard time sleeping now that I am at home?  A:  Many medications you receive while you are in the hospital can impact your sleep for a number of days after your surgery/hospitalization.  Decreased level of activity and naps during the day may also make sleeping at night difficult.  Try to minimize day-time naps, and get up frequently during the day to walk around your home during your recovery time.  Sleep aides may be of some help, but are not recommended for long-term use.      Q:  I am having some back discomfort.  What should I do?  A:  This may be related to certain positioning that was required for your surgery, extended periods of time in bed, or other changes in your overall activity level.  You may try ice, heat, acetaminophen, or ibuprofen to treat this temporarily.  Note that many pain medications have acetaminophen in them and would state this on the prescription bottle.  Be sure not to exceed the maximum of 4000mg per day of acetaminophen.     **If the pain you are having does not resolve, is severe, or is a flare of back pain you have had on other occasions prior to surgery, please contact your primary physician for further recommendations or for an appointment to be examined at their office.    Q:  Why am I having headaches?  A:  Headaches can be caused by many things:  caffeine withdrawal, use of pain meds, dehydration, high blood pressure, lack of sleep, over-activity/exhaustion, flare-up of usual migraine headaches.  If you feel this is related to muscle tension (a band-like feeling around the head, or a pressure at the low-back of the head) you may try ice or heat to this area.  You may need to drink more fluids (try electrolyte drink like Gatorade), rest, or take your usual migraine medications.   **If your headaches do not resolve, worsen, are accompanied by other  symptoms, or if your blood pressure is high, please call your primary physician for recommendation and/or examination.    Q:  I am unable to urinate.  What do I do?  A:  A small percentage of people can have difficulty urinating initially after surgery.  This includes being able to urinate only a very small amount at a time and feeling discomfort or pressure in the very low abdomen.  This is called  urinary retention , and is actually an urgent situation.  Proceed to your nearest Emergency department for evaluation (not an Urgent Care Center).  Sometimes the bladder does not work correctly after certain medications you receive during surgery, or related to certain procedures.  You may need to have a catheter placed until your bladder recovers.  When planning to go to an Emergency department, it may help to call the ER to let them know you are coming in for this problem after a surgery.  This may help you get in quicker to be evaluated.  **If you have symptoms of a urinary tract infection, please contact your primary physician for the proper evaluation and treatment.          If you have other questions, please call the office Monday thru Friday between 8am and 4:30pm to discuss with the nurse or physician assistant.  #[phone]    There is a surgeon ON CALL on weekday evenings and over the weekend in case of urgent need only, and may be contacted at the same number.    If you are having an emergency, call 911 or proceed to your nearest emergency department.

## 2024-01-29 NOTE — ANESTHESIA PROCEDURE NOTES
Airway       Patient location during procedure: OR       Procedure Start/Stop Times: 1/29/2024 10:35 AM  Staff -        CRNA: Ozzie Mcclure APRN CRNA       Performed By: CRNA  Consent for Airway        Urgency: elective  Indications and Patient Condition       Indications for airway management: yajaira-procedural       Induction type:intravenous       Mask difficulty assessment: 1 - vent by mask    Final Airway Details       Final airway type: endotracheal airway       Successful airway: ETT - single and Oral  Endotracheal Airway Details        ETT size (mm): 7.0       Cuffed: yes       Cuff volume (mL): 6       Successful intubation technique: video laryngoscopy       VL Blade Size: Glidescope 3       Grade View of Cords: 1       Adjucts: stylet       Position: Right       Measured from: gums/teeth       Secured at (cm): 21       Bite block used: Soft    Post intubation assessment        Placement verified by: capnometry, equal breath sounds and chest rise        Number of attempts at approach: 1       Number of other approaches attempted: 0       Secured with: tape       Ease of procedure: easy       Dentition: Intact and Unchanged    Medication(s) Administered   Medication Administration Time: 1/29/2024 10:35 AM

## 2024-01-29 NOTE — ANESTHESIA PREPROCEDURE EVALUATION
Anesthesia Pre-Procedure Evaluation    Patient: Radha James   MRN: [id number] : 1965        Procedure : Procedure(s):  CHOLECYSTECTOMY, LAPAROSCOPIC          Past Medical History:   Diagnosis Date    Depression     Hypertension     Old myocardial infarction     Stented coronary artery       Past Surgical History:   Procedure Laterality Date    ESOPHAGOSCOPY, GASTROSCOPY, DUODENOSCOPY (EGD), COMBINED N/A 1/3/2024    Procedure: Esophagoscopy, gastroscopy, duodenoscopy (EGD), combined with biopsies using biopsy forceps;  Surgeon: Isaac Torrez MD;  Location: [facility]    STENT      [place] teeth extraction        Allergies   Allergen Reactions    Zofran [Ondansetron] Nausea and Vomiting     Self reported      Social History     Tobacco Use    Smoking status: Former     Passive exposure: Never    Smokeless tobacco: Never   Substance Use Topics    Alcohol use: Yes     Alcohol/week: 1.0 standard drink of alcohol     Types: 1 Shots of liquor per week     Comment: socially once a month      Wt Readings from Last 1 Encounters:   24 119.4 kg (263 lb 4.8 oz)        Anesthesia Evaluation            ROS/MED HX  ENT/Pulmonary:  - neg pulmonary ROS     Neurologic:  - neg neurologic ROS     Cardiovascular:     (+)  hypertension- -   -  - stent-.  Drug Eluting Stent.                                    METS/Exercise Tolerance: >4 METS    Hematologic:  - neg hematologic  ROS     Musculoskeletal:  - neg musculoskeletal ROS     GI/Hepatic:     (+)          cholecystitis/cholelithiasis,          Renal/Genitourinary:  - neg Renal ROS     Endo:     (+)               Obesity,       Psychiatric/Substance Use:     (+) psychiatric history depression       Infectious Disease:  - neg infectious disease ROS     Malignancy:  - neg malignancy ROS     Other:  - neg other ROS          Physical Exam    Airway        Mallampati: II   TM distance: > 3 FB   Neck ROM: full   Mouth opening: > 3 cm    Respiratory Devices and  "Support         Dental       (+) Completely normal teeth      Cardiovascular   cardiovascular exam normal       Rhythm and rate: regular and normal     Pulmonary   pulmonary exam normal        breath sounds clear to auscultation           OUTSIDE LABS:  CBC:   Lab Results   Component Value Date    WBC 7.2 01/03/2024    WBC 8.1 01/02/2024    HGB 13.5 01/03/2024    HGB 15.7 01/02/2024    HCT 39.1 01/03/2024    HCT 44.6 01/02/2024     01/03/2024     01/02/2024     BMP:   Lab Results   Component Value Date     01/03/2024     01/02/2024    POTASSIUM 3.4 01/03/2024    POTASSIUM 3.8 01/02/2024    CHLORIDE 105 01/03/2024    CHLORIDE 98 01/02/2024    CO2 22 01/03/2024    CO2 22 01/02/2024    BUN 8.6 01/03/2024    BUN 9.9 01/02/2024    CR 0.77 01/03/2024    CR 0.82 01/02/2024     (H) 01/03/2024    GLC 95 01/03/2024     COAGS:   Lab Results   Component Value Date    INR 1.02 02/22/2021     POC: No results found for: \"BGM\", \"HCG\", \"HCGS\"  HEPATIC:   Lab Results   Component Value Date    ALBUMIN 3.8 01/03/2024    PROTTOTAL 6.6 01/03/2024    ALT 24 01/03/2024    AST 27 01/03/2024    ALKPHOS 59 01/03/2024    BILITOTAL 1.0 01/03/2024     OTHER:   Lab Results   Component Value Date    WENDI 8.4 (L) 01/03/2024    LIPASE 24 01/03/2024    TSH 1.36 02/22/2021       Anesthesia Plan    ASA Status:  3    NPO Status:  NPO Appropriate    Anesthesia Type: General.     - Airway: ETT   Induction: Intravenous.   Maintenance: Balanced.   Techniques and Equipment:     - Airway: Video-Laryngoscope       Consents    Anesthesia Plan(s) and associated risks, benefits, and realistic alternatives discussed. Questions answered and patient/representative(s) expressed understanding.     - Discussed: Risks, Benefits and Alternatives for the PROCEDURE were discussed     - Discussed with:  Patient       Use of blood products discussed: Yes.     - Discussed with: Patient.     - Consented: consented to blood products            " "Reason for refusal: other.     Postoperative Care    Pain management: IV analgesics, Oral pain medications.   PONV prophylaxis: Ondansetron (or other 5HT-3), Dexamethasone or Solumedrol, Scopolamine patch, Background Propofol Infusion     Comments:    Other Comments: NSTEM s/p ROSA to OM2 (2021). On aspirin 81 mg and atenolol, will take in pre-op as well as scolp patch.    Plan: BMI 43, GETA with VL, keep MAP > 70 mmHg to maintain coronary perfusion pressure. Background propofol infusion.           Jojo Magana MD    I have reviewed the pertinent notes and labs in the chart from the past 30 days and (re)examined the patient.  Any updates or changes from those notes are reflected in this note.     # Hyponatremia: Lowest Na = 135 mmol/L in last 30 days, will monitor as appropriate   # Hypocalcemia: Lowest Ca = 8.4 mg/dL in last 30 days, will monitor and replace as appropriate       # Drug Induced Platelet Defect: home medication list includes an antiplatelet medication  # Severe Obesity: Estimated body mass index is 43.82 kg/m  as calculated from the following:    Height as of this encounter: 1.651 m (5' 5\").    Weight as of this encounter: 119.4 kg (263 lb 4.8 oz).      "

## 2024-01-30 LAB
PATH REPORT.COMMENTS IMP SPEC: NORMAL
PATH REPORT.COMMENTS IMP SPEC: NORMAL
PATH REPORT.FINAL DX SPEC: NORMAL
PATH REPORT.GROSS SPEC: NORMAL
PATH REPORT.MICROSCOPIC SPEC OTHER STN: NORMAL
PATH REPORT.RELEVANT HX SPEC: NORMAL
PHOTO IMAGE: NORMAL

## 2024-01-30 PROCEDURE — 88304 TISSUE EXAM BY PATHOLOGIST: CPT | Mod: 26 | Performed by: PATHOLOGY

## 2024-02-19 ENCOUNTER — TELEPHONE (OUTPATIENT)
Dept: SURGERY | Facility: CLINIC | Age: 59
End: 2024-02-19
Payer: COMMERCIAL

## 2024-02-19 NOTE — CONFIDENTIAL NOTE
Attempted to call patient for post op check.  No answer.  Message was left for patient to call back if they had any questions of concerns.     Patsy Hartmann PA-C

## 2024-05-11 ENCOUNTER — HEALTH MAINTENANCE LETTER (OUTPATIENT)
Age: 59
End: 2024-05-11

## 2024-07-20 ENCOUNTER — HEALTH MAINTENANCE LETTER (OUTPATIENT)
Age: 59
End: 2024-07-20

## 2025-02-20 ENCOUNTER — HOSPITAL ENCOUNTER (OUTPATIENT)
Facility: CLINIC | Age: 60
Setting detail: OBSERVATION
DRG: 885 | End: 2025-02-20
Attending: EMERGENCY MEDICINE | Admitting: EMERGENCY MEDICINE
Payer: COMMERCIAL

## 2025-02-20 ENCOUNTER — TELEPHONE (OUTPATIENT)
Dept: BEHAVIORAL HEALTH | Facility: CLINIC | Age: 60
End: 2025-02-20

## 2025-02-20 VITALS — OXYGEN SATURATION: 98 %

## 2025-02-20 DIAGNOSIS — F30.9 MANIA (H): ICD-10-CM

## 2025-02-20 PROBLEM — F31.10 BIPOLAR I DISORDER, MOST RECENT EPISODE (OR CURRENT) MANIC (H): Status: ACTIVE | Noted: 2025-02-20

## 2025-02-20 LAB
ANION GAP SERPL CALCULATED.3IONS-SCNC: 19 MMOL/L (ref 7–15)
ATRIAL RATE - MUSE: 106 BPM
BASOPHILS # BLD AUTO: 0.1 10E3/UL (ref 0–0.2)
BASOPHILS NFR BLD AUTO: 0 %
BUN SERPL-MCNC: 16.2 MG/DL (ref 6–20)
CALCIUM SERPL-MCNC: 10.4 MG/DL (ref 8.8–10.4)
CHLORIDE SERPL-SCNC: 97 MMOL/L (ref 98–107)
CREAT SERPL-MCNC: 0.91 MG/DL (ref 0.51–0.95)
DIASTOLIC BLOOD PRESSURE - MUSE: NORMAL MMHG
EGFRCR SERPLBLD CKD-EPI 2021: 73 ML/MIN/1.73M2
EOSINOPHIL # BLD AUTO: 0 10E3/UL (ref 0–0.7)
EOSINOPHIL NFR BLD AUTO: 0 %
ERYTHROCYTE [DISTWIDTH] IN BLOOD BY AUTOMATED COUNT: 13.7 % (ref 10–15)
ETHANOL SERPL-MCNC: <0.01 G/DL
GLUCOSE SERPL-MCNC: 184 MG/DL (ref 70–99)
HCO3 SERPL-SCNC: 19 MMOL/L (ref 22–29)
HCT VFR BLD AUTO: 45.4 % (ref 35–47)
HGB BLD-MCNC: 15.8 G/DL (ref 11.7–15.7)
IMM GRANULOCYTES # BLD: 0.1 10E3/UL
IMM GRANULOCYTES NFR BLD: 0 %
INR PPP: 1.06 (ref 0.85–1.15)
INTERPRETATION ECG - MUSE: NORMAL
LYMPHOCYTES # BLD AUTO: 1.9 10E3/UL (ref 0.8–5.3)
LYMPHOCYTES NFR BLD AUTO: 16 %
MCH RBC QN AUTO: 32.1 PG (ref 26.5–33)
MCHC RBC AUTO-ENTMCNC: 34.8 G/DL (ref 31.5–36.5)
MCV RBC AUTO: 92 FL (ref 78–100)
MONOCYTES # BLD AUTO: 0.8 10E3/UL (ref 0–1.3)
MONOCYTES NFR BLD AUTO: 7 %
NEUTROPHILS # BLD AUTO: 8.7 10E3/UL (ref 1.6–8.3)
NEUTROPHILS NFR BLD AUTO: 75 %
NRBC # BLD AUTO: 0 10E3/UL
NRBC BLD AUTO-RTO: 0 /100
P AXIS - MUSE: 71 DEGREES
PLATELET # BLD AUTO: 346 10E3/UL (ref 150–450)
POTASSIUM SERPL-SCNC: 3.8 MMOL/L (ref 3.4–5.3)
PR INTERVAL - MUSE: 174 MS
QRS DURATION - MUSE: 76 MS
QT - MUSE: 352 MS
QTC - MUSE: 467 MS
R AXIS - MUSE: 54 DEGREES
RBC # BLD AUTO: 4.92 10E6/UL (ref 3.8–5.2)
SODIUM SERPL-SCNC: 135 MMOL/L (ref 135–145)
SYSTOLIC BLOOD PRESSURE - MUSE: NORMAL MMHG
T AXIS - MUSE: 67 DEGREES
VENTRICULAR RATE- MUSE: 106 BPM
WBC # BLD AUTO: 11.5 10E3/UL (ref 4–11)

## 2025-02-20 PROCEDURE — 82374 ASSAY BLOOD CARBON DIOXIDE: CPT

## 2025-02-20 PROCEDURE — 80048 BASIC METABOLIC PNL TOTAL CA: CPT

## 2025-02-20 PROCEDURE — 85004 AUTOMATED DIFF WBC COUNT: CPT

## 2025-02-20 PROCEDURE — 250N000013 HC RX MED GY IP 250 OP 250 PS 637: Performed by: EMERGENCY MEDICINE

## 2025-02-20 PROCEDURE — 250N000013 HC RX MED GY IP 250 OP 250 PS 637: Performed by: PSYCHIATRY & NEUROLOGY

## 2025-02-20 PROCEDURE — 99205 OFFICE O/P NEW HI 60 MIN: CPT | Performed by: PSYCHIATRY & NEUROLOGY

## 2025-02-20 PROCEDURE — 93005 ELECTROCARDIOGRAM TRACING: CPT | Performed by: EMERGENCY MEDICINE

## 2025-02-20 PROCEDURE — G0378 HOSPITAL OBSERVATION PER HR: HCPCS

## 2025-02-20 PROCEDURE — 82310 ASSAY OF CALCIUM: CPT

## 2025-02-20 PROCEDURE — 85610 PROTHROMBIN TIME: CPT

## 2025-02-20 PROCEDURE — 82077 ASSAY SPEC XCP UR&BREATH IA: CPT

## 2025-02-20 PROCEDURE — 36415 COLL VENOUS BLD VENIPUNCTURE: CPT

## 2025-02-20 PROCEDURE — 99285 EMERGENCY DEPT VISIT HI MDM: CPT | Performed by: EMERGENCY MEDICINE

## 2025-02-20 PROCEDURE — 93005 ELECTROCARDIOGRAM TRACING: CPT | Mod: 76

## 2025-02-20 RX ORDER — LORAZEPAM 2 MG/ML
2 INJECTION INTRAMUSCULAR
Status: DISCONTINUED | OUTPATIENT
Start: 2025-02-20 | End: 2025-02-22 | Stop reason: HOSPADM

## 2025-02-20 RX ORDER — OLANZAPINE 10 MG/1
10 TABLET, ORALLY DISINTEGRATING ORAL ONCE
Status: COMPLETED | OUTPATIENT
Start: 2025-02-20 | End: 2025-02-20

## 2025-02-20 RX ORDER — LORAZEPAM 2 MG/1
2 TABLET ORAL ONCE
Status: COMPLETED | OUTPATIENT
Start: 2025-02-20 | End: 2025-02-20

## 2025-02-20 RX ORDER — HALOPERIDOL 5 MG/ML
5 INJECTION INTRAMUSCULAR
Status: DISCONTINUED | OUTPATIENT
Start: 2025-02-20 | End: 2025-02-22 | Stop reason: HOSPADM

## 2025-02-20 RX ORDER — OLANZAPINE 10 MG/1
10 TABLET, ORALLY DISINTEGRATING ORAL 2 TIMES DAILY
Status: DISCONTINUED | OUTPATIENT
Start: 2025-02-20 | End: 2025-02-22 | Stop reason: HOSPADM

## 2025-02-20 RX ADMIN — OLANZAPINE 10 MG: 10 TABLET, ORALLY DISINTEGRATING ORAL at 17:11

## 2025-02-20 RX ADMIN — OLANZAPINE 10 MG: 10 TABLET, ORALLY DISINTEGRATING ORAL at 15:47

## 2025-02-20 RX ADMIN — OLANZAPINE 10 MG: 10 TABLET, ORALLY DISINTEGRATING ORAL at 20:10

## 2025-02-20 RX ADMIN — LORAZEPAM 2 MG: 2 TABLET ORAL at 20:59

## 2025-02-20 ASSESSMENT — COLUMBIA-SUICIDE SEVERITY RATING SCALE - C-SSRS
6. HAVE YOU EVER DONE ANYTHING, STARTED TO DO ANYTHING, OR PREPARED TO DO ANYTHING TO END YOUR LIFE?: NO
1. IN THE PAST MONTH, HAVE YOU WISHED YOU WERE DEAD OR WISHED YOU COULD GO TO SLEEP AND NOT WAKE UP?: NO
2. HAVE YOU ACTUALLY HAD ANY THOUGHTS OF KILLING YOURSELF IN THE PAST MONTH?: NO

## 2025-02-20 ASSESSMENT — ACTIVITIES OF DAILY LIVING (ADL)
ADLS_ACUITY_SCORE: 54

## 2025-02-20 NOTE — CONSULTS
"[facility]  Department of Psychiatry  Consultation note    Radha James MRN: [id number]   Age: 59 year old YOB: 1965     History     Chief Complaint   Patient presents with    Altered Mental Status    Mental Health Problem     Pt in manic episode     HPI  Radha James is a 59 year old female with history of bipolar disorder here with her . Patient is disorganized, tangential, paranoid, hyperverbal, cannot give a history. Patient felt that I was \"scary Maico\", talked about a variety of unrelated topics. She is amenable to hospitalization. Her  reports that she has had a manic episode about 4 years ago which was very similar to this and she was hospitalized at that time. Since then she has had depressive episodes but no manic episodes until the past 2 weeks or so. She has not been sleeping much over the past week and has been deteriorating daily. She has not been on any psychiatric medications in the past 8 months or so. Prior to that was on antidepressants. Unclear when she was last on a mood stabilizer. She saw her therapist today who referred her to the ED. Chart review shows she was on olanzapine in 2021.  reports that she vapes THC, no other known substance use. UDS pending.     Past Medical History  Past Medical History:   Diagnosis Date    Depression     Hypertension     Old myocardial infarction 2021    Stented coronary artery      Past Surgical History:   Procedure Laterality Date    ESOPHAGOSCOPY, GASTROSCOPY, DUODENOSCOPY (EGD), COMBINED N/A 1/3/2024    Procedure: Esophagoscopy, gastroscopy, duodenoscopy (EGD), combined with biopsies using biopsy forceps;  Surgeon: Isaac Torrez MD;  Location:  GI    LAPAROSCOPIC CHOLECYSTECTOMY N/A 1/29/2024    Procedure: LAPAROSCOPIC CHOLECYSTECTOMY;  Surgeon: Thomas Trevino MD;  Location: [facility] OR    STENT      [place] teeth extraction       acetaminophen (TYLENOL) 325 MG " tablet  amLODIPine (NORVASC) 5 MG tablet  aspirin 81 MG EC tablet  atenolol (TENORMIN) 25 MG tablet  atorvastatin (LIPITOR) 40 MG tablet  diphenhydrAMINE (BENADRYL) 25 MG capsule  HYDROmorphone (DILAUDID) 4 MG tablet  medical cannabis (Patient's own supply)  metoclopramide (REGLAN) 5 MG tablet  oxyCODONE (ROXICODONE) 5 MG tablet  pantoprazole (PROTONIX) 40 MG EC tablet  prochlorperazine (COMPAZINE) 25 MG suppository  prochlorperazine (COMPAZINE) 5 MG tablet  sucralfate (CARAFATE) 1 GM/10ML suspension      Allergies   Allergen Reactions    Zofran [Ondansetron] Nausea and Vomiting     Self reported     Family History  Family History   Problem Relation Age of Onset    Colon Cancer Mother     Breast Cancer Paternal Grandmother      Social History   Social History     Tobacco Use    Smoking status: Former     Passive exposure: Never    Smokeless tobacco: Never   Vaping Use    Vaping status: Former   Substance Use Topics    Alcohol use: Yes     Alcohol/week: 1.0 standard drink of alcohol     Types: 1 Shots of liquor per week     Comment: socially once a month    Drug use: Yes     Comment: Vaping - THC          Review of Systems  A medically appropriate review of systems was performed with pertinent positives and negatives noted in the HPI, and all other systems negative.    Physical Examination        Physical Exam  General: Appears stated age.   Neuro: Alert and fully oriented. Extremities appear to demonstrate normal strength on visual inspection.   Integumentary/Skin: no rash visualized, normal color    Psychiatric Examination   Appearance: awake, alert and adequately groomed  Attitude:  cooperative  Eye Contact:  good  Mood:  good  Affect:   elevated and labile  Speech:   rapid, loud, hyperverbal  Psychomotor Behavior:  no evidence of tardive dyskinesia, dystonia, or tics  Thought Process:  tangental  Associations:  loosening of associations present  Thought Content:   not expressing SI/HI, not responding to internal  stimuli, + paranoia  Insight:  limited  Judgement:  limited  Oriented to:   unable to assess  Attention Span and Concentration:  poor  Recent and Remote Memory:   unable to assess  Language: able to name/identify objects without impairment  Fund of Knowledge: intact with awareness of current and past events    ED Course        Labs Ordered and Resulted from Time of ED Arrival to Time of ED Departure   CBC WITH PLATELETS AND DIFFERENTIAL - Abnormal       Result Value    WBC Count 11.5 (*)     RBC Count 4.92      Hemoglobin 15.8 (*)     Hematocrit 45.4      MCV 92      MCH 32.1      MCHC 34.8      RDW 13.7      Platelet Count 346      % Neutrophils 75      % Lymphocytes 16      % Monocytes 7      % Eosinophils 0      % Basophils 0      % Immature Granulocytes 0      NRBCs per 100 WBC 0      Absolute Neutrophils 8.7 (*)     Absolute Lymphocytes 1.9      Absolute Monocytes 0.8      Absolute Eosinophils 0.0      Absolute Basophils 0.1      Absolute Immature Granulocytes 0.1      Absolute NRBCs 0.0     INR - Normal    INR 1.06     ETHYL ALCOHOL LEVEL - Normal    Alcohol ethyl <0.01     BASIC METABOLIC PANEL   ROUTINE UA WITH MICROSCOPIC REFLEX TO CULTURE       Assessments & Plan (with Medical Decision Making)   Patient with bipolar disorder presenting with an acute manic episode. She is tangential, disorganized and cannot currently function in society without her 's constant assistance. She is in need of inpatient care and is, so far, agreeable. If she changes her mind she is likely holdable. Will need to treat the acute lauren then she need a mood stabilizer. Recommend olanzapine ODT for more rapid symptom relief while she is  in the ED, not necessarily the best long term mediation for her.     I have reviewed the assessment completed by the [facility].     Preliminary diagnosis:  Bipolar I disorder current episode manic, severe    Treatment Plan:  -Patient needs inpatient stabilization, is on the list for an inpatient  bed  -Recommend olanzapine 10 mg one time dose now then schedule 10 mg BID while she is in the ED    --  Diane Jacome MD   [facility] EMERGENCY DEPARTMENT

## 2025-02-20 NOTE — ED PROVIDER NOTES
"       [facility] EMERGENCY DEPARTMENT ([facility])    2/20/25      ED PROVIDER NOTE     History   No chief complaint on file.    HPI  Radha James is a 59 year old female with a past medical history of HTN, CAD s/p ROSA, manic episode on feb 2021, who presents to the emergency department for mental health problem. At interview patient mentions to not trust the interviewer due to \"using glasses\", and she does not trust any one with glasses. Patient unable to talk coherently, she mentions that she is here for a fall but does not know how it happen. When asked about who brought her here she mentions that probably some of the \"guards around here or Jose\". When asked who is Jose, she mentions is her spouse, she becomes teary and talk about how it hurt in her heart. She is unable to tell where she is, she mentions is [place] but thinks she is at [place]. Unable to recall usual medications or chronic conditions. Patient is hyperverbal, unable to be still, tangential, mostly talking about movies and actors. Seems at times talking to herself or people not present.     Collateral: :   Matt mentions that she had a similar episode back 4 years ago where she ended hospitalized here. He mentions that currently seems worse, she was unable to sleep for probably a week, with small naps of 10 min and yesterday, she took some cold medication that only made her sleep for 1 hour. She is energetic, hyperverbal, unable to eat, sleep or work, since Tuesday last week. She became suspiscious of the people around her, and started to have VH and AH. He mentions she was on antidepressants until 8 months ago. Where she was taken off of any psychotropic. He mentions that besides the manic episode in 2021, on may of the same year she was with a depressive episode and cardiac infarct, [place] [facility] [place] also [place].  reports she vapes cannabis 3-4/week. But seems like she stopped 1 week ago by herself, as " he found the vape cartage on the trash. He mentions she had a depressive episode in 2006 but did not follow with a psychiatrist since the one treating her retired. She also has a brother with Bipolar disorder.     Past Medical History  Past Medical History:   Diagnosis Date    Depression     Hypertension     Old myocardial infarction 2021    Stented coronary artery      Past Surgical History:   Procedure Laterality Date    ESOPHAGOSCOPY, GASTROSCOPY, DUODENOSCOPY (EGD), COMBINED N/A 1/3/2024    Procedure: Esophagoscopy, gastroscopy, duodenoscopy (EGD), combined with biopsies using biopsy forceps;  Surgeon: Isaac Torrez MD;  Location: [facility] GI    LAPAROSCOPIC CHOLECYSTECTOMY N/A 1/29/2024    Procedure: LAPAROSCOPIC CHOLECYSTECTOMY;  Surgeon: Thomas Trevino MD;  Location: [facility] OR    STENT      [place] teeth extraction       acetaminophen (TYLENOL) 325 MG tablet  amLODIPine (NORVASC) 5 MG tablet  aspirin 81 MG EC tablet  atenolol (TENORMIN) 25 MG tablet  atorvastatin (LIPITOR) 40 MG tablet  diphenhydrAMINE (BENADRYL) 25 MG capsule  HYDROmorphone (DILAUDID) 4 MG tablet  medical cannabis (Patient's own supply)  metoclopramide (REGLAN) 5 MG tablet  oxyCODONE (ROXICODONE) 5 MG tablet  pantoprazole (PROTONIX) 40 MG EC tablet  prochlorperazine (COMPAZINE) 25 MG suppository  prochlorperazine (COMPAZINE) 5 MG tablet  sucralfate (CARAFATE) 1 GM/10ML suspension      Allergies   Allergen Reactions    Zofran [Ondansetron] Nausea and Vomiting     Self reported     Family History  Family History   Problem Relation Age of Onset    Colon Cancer Mother     Breast Cancer Paternal Grandmother      Social History   Social History     Tobacco Use    Smoking status: Former     Passive exposure: Never    Smokeless tobacco: Never   Vaping Use    Vaping status: Former   Substance Use Topics    Alcohol use: Yes     Alcohol/week: 1.0 standard drink of alcohol     Types: 1 Shots of liquor per week     Comment: socially once a month    Drug use:  "Yes     Comment: Vaping - THC      A medically appropriate review of systems was performed with pertinent positives and negatives noted in the HPI, and all other systems negative.    Physical Exam      Physical Exam  Neurological:      Mental Status: She is alert.   Psychiatric:         Attention and Perception: She is inattentive. She perceives auditory and visual hallucinations.         Mood and Affect: Mood is elated. Affect is labile and inappropriate.         Speech: Speech is rapid and pressured and tangential.         Behavior: Behavior is agitated and hyperactive.         Thought Content: Thought content is delusional.         Cognition and Memory: Memory is impaired.         Judgment: Judgment is impulsive and inappropriate.       ***    ED Course, Procedures, & Data      Procedures       {ED Course Selections (Optional):829656}  {ED Sepsis CMS Documentation (Optional):399356::\" \"}       No results found for any visits on 02/20/25.  Medications - No data to display  Labs Ordered and Resulted from Time of ED Arrival to Time of ED Departure - No data to display  No orders to display          {Critical Care Performed?:517255}    Assessment & Plan    58yo female with past history of previous manic episode 4 years ago. Currently with manic and psychotic symptoms. Possible new episode. Unable to take care of herself or others. Brought by  after psychotherapist recommend it in today's appointment. Possible Bipolar disorder with psychotic features. V schizoaffective but low in the differential    PLAN:  DEC assessment.      I have reviewed the nursing notes. I have reviewed the findings, diagnosis, plan and need for follow up with the patient.    New Prescriptions    No medications on file       Final diagnoses:   None   Regine Damico MD  [facility] Psychiatry Resident    ATTESTATION    ***  [facility] EMERGENCY DEPARTMENT  2/20/2025  " bee Cano MD  [facility] Psychiatry Resident    ATTESTATION    ***  [facility] EMERGENCY DEPARTMENT  2/20/2025

## 2025-02-20 NOTE — TELEPHONE ENCOUNTER
S: [facility] ISRAEL Justice  Hien  calling at 2:55 PM  about a 59 year old/Female presenting with Phuong, VH, AH and Paranoia.       B: Pt arrived via   . Presenting problem, stressors: Pt was brought in after going to urgent Therapy appt. For acute manic episode. She presents with hyper-verbal speech, tangential and incoherent.  reports this is her second manic episode since 2021. Pt has not been sleeping and only has had one hour of sleep in the past two days (15 min increments). She has been observed talking to the television. Her Psychiatrist stopped all psychotropic meds 8 months ago, sx started a week ago.    Pt affect in ED: Hyperverbal and Tangential  Pt Dx: Major Depressive Disorder, Generalized Anxiety Disorder, Bipolar Disorder, and Substance Use Disorder: Cannabis 2 wks ago.  Previous [facility] hx? Yes: 2021 [facility], received ECT during this admit.   Pt denies SI   Hx of suicide attempt? No  Pt denies SIB  Pt denies HI   Pt endorses auditory hallucinations  and endorses visual hallucination .   Pt RARS Score: 3      Hx of aggression/violence, sexual offenses, legal concerns, Epic care plan? describe: None  Current concerns for aggression this visit? Yes: Verbally escalated and agitated, very easily redirected  Does pt have a history of Civil Commitment? No  Is Pt their own guardian? Yes    Pt is not prescribed medication. Is patient medication compliant?  Up until a week ago  Pt endorses OP services: Psychiatrist and Therapist  CD concerns: Actively using/consuming Cannabis  Acute or chronic medical concerns: No  Does Pt present with specific needs, assistive devices, or exclusionary criteria? None      Pt is ambulatory  Pt is able to perform ADLs independently      A: Pt to be reviewed for [facility] admission. Pt is Voluntary  Preferred placement: [facility], preference for station 22.    COVID Symptoms: No  If yes, COVID test required   Utox: Ordered, not yet collected   CMP: In process  CBC:  Abnormalities: WBC 11.5; Hemoglobin 15.8; Neutrophils 8.7      R: Patient cleared and ready for behavioral bed placement: Yes  Pt placed on [facility] worklist? Yes    Does Patient need a Transfer Center request created? No, Pt is located within [facility] ED, [facility] ED, or [facility]      Pt remains on the work list pending appropriate bed availability.

## 2025-02-20 NOTE — CONSULTS
Diagnostic Evaluation Consultation  Crisis Assessment    Patient Name: Radha James  Age:  59 year old  Legal Sex: female  Gender Identity: female  Pronouns:   Race: White  Ethnicity: Not  or   Language: English      Patient was assessed: In person   Crisis Assessment Start Date: 02/20/25  Crisis Assessment Start Time: 1240  Crisis Assessment Stop Time: 1257  Patient location: [facility] Emergency Department                             [id number]    Referral Data and Chief Complaint  Radha James presents to the ED with family/friends. Patient is presenting to the ED for the following concerns: Worsening psychosocial stress, Paranoia. Factors that make the mental health crisis life threatening or complex are: Patient presents to the ED accompanied by her  after an urgent appointment with pt's therapist, Ginette Mclaughlin, this morning. Clinician attempted to meet with patient. Pt's  is present. She states she does not want him to leave. Pt is high energy, hyperverbal, and displays tangential speech. Pt states she does not trust clinician due to her green sweater and requests it be taken off. Clinician is unable to appropriately remove sweater. Pt tries to hand clinician her diet coke. She states she has put something in it and can't say what it was. She states she wants it out of the room, then does not want it out. She does not want her  to have it. Pt states they are here due to pain and looks toward her . She states he has cancer.  clarifies he does not have cancer. Pt changes subjects quickly referencing LUIS MIGUEL, Oscar Reyes, Groundhog Day, and The Fifth Element. She becomes loud and teary as she share seeing her homeless brother, who has bipolar and is in [place], on TV in a homeless encampment. Pt is unable to engage coherently in assessment. She continues to be mistrustful and lists of many types of the color green and random relatives. She randomly  "becomes more escalated and is easily redirected. No physical aggression. Met with  separate from pt.  states he first suspected something was off about a week ago when pt stated if she need to see a psychiatrist she wanted to see someone other than her established psychiatrist, Dr Torres, due to not trusting her anymore. He reports two days ago things really spiked, he observed her speaking to her brother on the phone, and it seeming to be similar to her prior manic episode in 2021. He states he started to think pt may need to be hospitalized. He reports reaching out and getting an urgent appointment today with her therapist. They were able to be seen today and referred here.  states if he would not have been able to get this mornings appointment with the therapist he would have brought her to the hospital. He reports pt has been paranoid. She thinks people are listening to her and she tries to communicate with him by hand signals. He makes references to \"them.\" She has outburst of yelling. She talks to the television and gets fixed on friends coming to fix things. Pt is focused on items needing to be in sets of 3 or 5 and in a specific place and order. Patient is not sleeping. He states the past 2 nights patient has slept maybe an hour a night in 15 min episodes..      Informed Consent and Assessment Methods  Explained the crisis assessment process, including applicable information disclosures and limits to confidentiality, assessed understanding of the process, and obtained consent to proceed with the assessment.  Assessment methods included conducting a formal interview with patient, review of medical records, collaboration with medical staff, and obtaining relevant collateral information from family and community providers when available.  : done     History of the Crisis   Record indicates hx of bipolar I, depression and anxiety.  Pt vapes cannabis 3-4x/week.  She has been off psychiatric " medications for about 8 months.  She was last hospitalized for [facility], for a manic episode in Feb 2021.   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 He reports this current encounter is the second time he has witnessed pt have a manic episode in their 32 years together.    Brief Psychosocial History  Family:  , Children    Support System:    Employment Status:  unemployed  Source of Income:  unemployment  Financial Environmental Concerns:   (unable to assess)  Current Hobbies:   (unable to assess)  Barriers in Personal Life:   (unable to assess)    Significant Clinical History  Current Anxiety Symptoms:  racing thoughts, anxious, excessive worry  Current Depression/Trauma:  difficulty concentrating, impaired decision making, irritable  Current Somatic Symptoms:  racing thoughts, excessive worry, anxious  Current Psychosis/Thought Disturbance:  inattentive, elated mood, flight of ideas, distractability, hyperverbal  Current Eating Symptoms:   (unable to assess)  Chemical Use History:  Alcohol:  (unable to assess)  Benzodiazepines:  (unable to assess)  Opiates:  (unable to assess)  Cocaine:  (unable to assess)  Marijuana:  (collateral report of pt vaping cannabis 3-4x/week.  last use thought to be a week ago.)  Other Use:  (unable to assess)  Withdrawal Symptoms:  (n/a)  Addictions:  (n/a)   Past diagnosis:  Bipolar Disorder  Family history:  Bipolar Disorder  Past treatment:  Individual therapy, Inpatient Hospitalization, Primary Care, Psychiatric Medication Management, Other (Hx of ECT)  Details of most recent treatment:  Pt is currently working with psychiatrist Dr Torres and therapist Ginette Mclaughlin at the [facility].  Pt was seen by her therapist this morning and received recommendation to go to the ED.  Patient's last inpatient mental health admission was 2021.  Her last manic episode was in Feb  '2021.   states they have been together for 32 years and this current episode is only the second manic episode he has witnessed.  Per , pt had ECT from June '21-Feb '22.  Pt was taking off her psychiaric medication about 8 months ago.  Other relevant history:       Have there been any medication changes in the past two weeks:  patient is not on psychiatric meds       Is the patient compliant with medications:   (off psych meds 8 months)        Collateral Information  Is there collateral information: Yes     Collateral information name, relationship, phone number:  Jose, , [phone]    What happened today: see narrative.     What is different about patient's functioning: see narrative     What do you think the patient needs:      Has patient made comments about wanting to kill themselves/others: no    If d/c is recommended, can they take part in safety/aftercare planning:       Additional collateral information:        Risk Assessment  [place] Suicide Severity Rating Scale Full Clinical Version:  Suicidal Ideation  Q6 Suicide Behavior (Lifetime): no          [place] Suicide Severity Rating Scale Recent:   Suicidal Ideation (Recent)  Q1 Wished to be Dead (Past Month): no  Q2 Suicidal Thoughts (Past Month): no  Level of Risk per Screen: no risks indicated          Environmental or Psychosocial Events:    Protective Factors: Protective Factors: intact marriage or domestic partnership, lives in a responsibly safe and stable environment, help seeking, supportive ongoing medical and mental health care relationships, strong bond to family unit, community support, or employment    Does the patient have thoughts of harming others? Feels Like Hurting Others: no  Current presentation: Confused, Irritable  Violence Threats in Past 6 Months: no known violence  Current Violence Plan or Thoughts: none identified  Is the patient engaging in sexually inappropriate behavior?: no  Duty to warn initiated:  no  Duty to warn details: n/a  Does Patient have a known history of aggressive behavior: No  Has aggression occurred as a result of  concerns/diagnosis: n/a  Does patient have history of aggression in hospital: n/a    Is the patient engaging in sexually inappropriate behavior?  no        Mental Status Exam   Affect: Dramatic  Appearance: Appropriate  Attention Span/Concentration: Inattentive  Eye Contact: Variable    Fund of Knowledge: Appropriate   Language /Speech Content: Expressive Speech  Language /Speech Volume: Normal, Loud  Language /Speech Rate/Productions: Hyperverbal, Pressured  Recent Memory: Poor  Remote Memory: Poor  Mood: Anxious, Euphoric  Orientation to Person: Yes   Orientation to Place: No  Orientation to Time of Day: No  Orientation to Date: No     Situation (Do they understand why they are here?): No  Psychomotor Behavior: Hyperactive  Thought Content: Paranoia (Manic)  Thought Form: Tangential     Mini-Cog Assessment  Number of Words Recalled:    Clock-Drawing Test:     Three Item Recall:    Mini-Cog Total Score:       Medication  Psychotropic medications:   Medication Orders - Psychiatric (From admission, onward)      None             Current Care Team  Patient Care Team:  Clinic, [facility] as PCP - General  Thomas Trevino MD as Assigned Surgical Provider    Diagnosis  Patient Active Problem List   Diagnosis Code    Unusual change in behavior R46.89    Psychosis, unspecified psychosis type (H) F29    Intractable vomiting R11.10    Phuong (H) F30.9    Bipolar I disorder, most recent episode (or current) manic (H) F31.10       Primary Problem This Admission  Active Hospital Problems    Phuong (H)      Bipolar I disorder, most recent episode (or current) manic (H)        Clinical Summary and Substantiation of Recommendations   Clinical Substantiation:  Pt is acutely manic.  Poor insight and judgement.  Tangential, hyperverbal, pressured speech.  Pt is agreeable to inpatient  admission.    Goals for crisis stabilization:  symptom stabilization, safety.    Next steps for Care Team:  Inpt mental health admission, psychiatry consult.    Treatment Objectives Addressed:  rapport building, assessing safety    Therapeutic Interventions:  Engaged in guided discovery, explored patient's perspectives and helped expand them through socratic dialogue.    Has a specific means been identified for suicidal/homicide actions: No    If yes, describe:       Explain action steps toward mitigation:       Document completion of mitigation actions:       The follow up action still needed prior to discharge:       Patient coping skills attempted to reduce the crisis:  urgent appointment with therapist this morning.    Disposition  Recommended referrals: Medication Management        Reviewed case and recommendations with attending provider. Attending Name: Dr Collier and Regine Damico MD  [facility] Psychiatry Resident       Attending concurs with disposition: yes       Patient and/or validated legal guardian concurs with disposition:   yes       Final disposition:  inpatient mental health            Severe psychiatric, behavioral or other comorbid conditions are appropriate for management at inpatient mental health as indicated by at least one of the following: Psychiatric Symptoms  Severe dysfunction in daily living is present as indicated by at least one of the following: Extreme deterioration in social interactions, Complete inability to maintain any appropriate aspect of personal responsibility in any adult roles  Situation and expectations are appropriate for inpatient care: Voluntary treatment at lower level of care is not feasible  Inpatient mental health services are necessary to meet patient needs and at least one of the following: Specific condition related to admission diagnosis is present and judged likely to further improve at proposed level of care, Specific condition related to admission  diagnosis is present and judged likely to deteriorate in absence of treatment at proposed level of care, Patient is receiving continuing care (eg, transition of care from less intensive level of care)      Legal status: Voluntary/Patient has signed consent for treatment                            Reviewed court records: yes       Assessment Details   Total duration spent with the patient: 17 min     CPT code(s) utilized: 39425 - Psychotherapy (with patient) - 30 (16-37*) min    Hien Wayne [facility], Psychotherapist  [facility] - Triage & Transition Services  Callback: [phone]      '

## 2025-02-20 NOTE — PHARMACY-ADMISSION MEDICATION HISTORY
"Pharmacist Admission Medication History    Admission medication history is complete. The information provided in this note is only as accurate as the sources available at the time of the update.    Information Source(s): Patient via in-person + fill history    Pertinent Information: Pt was tangential and disorganized but stated she last took her amlodipine on Sunday. She was able to identify atenolol and atorvastatin as other medications she takes but stated that \"time is irrelevant.\" Pt stated she is allergic to one thing but lost her train of thought to finish what that one thing was.     Fill history matched amlodpine, atenolol and atorvastin.    Changes made to PTA medication list:  Added: None  Deleted: aspirin, hydromorphone, metoclopramide, pantoprazole, prochlorperazine, sucralfate  Changed: None    Allergies reviewed with patient and updates made in EHR: unable to assess    Medication History Completed By: Tegan Zazueta RP 2/20/2025 5:12 PM    PTA Med List   Medication Sig Last Dose/Taking    amLODIPine (NORVASC) 5 MG tablet Take 5 mg by mouth daily 2/16/2025    atenolol (TENORMIN) 25 MG tablet Take 25 mg by mouth daily Past Week    atorvastatin (LIPITOR) 40 MG tablet Take 40 mg by mouth daily Past Week      "

## 2025-02-20 NOTE — ED NOTES
IP MH Referral Acuity Rating Score (RARS)    LMHP complete at referral to IP MH, with [facility]; and, daily while awaiting IP MH placement. Call score to PPS.  CRITERIA SCORING   New 74 HH and Involuntary for IP MH (not adolescent) 0/3   Boarding over 24 hours 0/1   Vulnerable adult at least 55+ with multiple co morbidities; or, Patient age 11 or under 0/1   Suicide ideation without relief of precipitating factors 0/1   Current plan for suicide 0/1   Current plan for homicide 0/1   Imminent risk or actual attempt to seriously harm another without relief of factors precipitating the attempt 0/1   Severe dysfunction in daily living (ex: complete neglect for self care, extreme disruption in vegetative function, extreme deterioration in social interactions) 1/1   Recent (last 2 weeks) or current physical aggression in the ED 0/1   Restraints or seclusion episode in ED 0/1   Verbal aggression, agitation, yelling, etc., while in the ED 0/1   Active psychosis with psychomotor agitation or catatonia 1/1   Need for constant or near constant redirection (from leaving, from others, etc).  0/1   Intrusive or disruptive behaviors 1/1   TOTAL 3

## 2025-02-21 LAB
ALBUMIN UR-MCNC: 10 MG/DL
AMPHETAMINES UR QL SCN: ABNORMAL
APPEARANCE UR: CLEAR
BACTERIA #/AREA URNS HPF: ABNORMAL /HPF
BARBITURATES UR QL SCN: ABNORMAL
BENZODIAZ UR QL SCN: ABNORMAL
BILIRUB UR QL STRIP: NEGATIVE
BZE UR QL SCN: ABNORMAL
CANNABINOIDS UR QL SCN: ABNORMAL
COLOR UR AUTO: ABNORMAL
FENTANYL UR QL: ABNORMAL
GLUCOSE UR STRIP-MCNC: NEGATIVE MG/DL
HGB UR QL STRIP: NEGATIVE
HYALINE CASTS: 1 /LPF
KETONES UR STRIP-MCNC: NEGATIVE MG/DL
LEUKOCYTE ESTERASE UR QL STRIP: ABNORMAL
MUCOUS THREADS #/AREA URNS LPF: PRESENT /LPF
NITRATE UR QL: NEGATIVE
OPIATES UR QL SCN: ABNORMAL
PCP QUAL URINE (ROCHE): ABNORMAL
PH UR STRIP: 5.5 [PH] (ref 5–7)
RBC URINE: <1 /HPF
SP GR UR STRIP: 1.03 (ref 1–1.03)
SQUAMOUS EPITHELIAL: 1 /HPF
UROBILINOGEN UR STRIP-MCNC: NORMAL MG/DL
WBC URINE: 4 /HPF

## 2025-02-21 PROCEDURE — 80307 DRUG TEST PRSMV CHEM ANLYZR: CPT

## 2025-02-21 PROCEDURE — 81001 URINALYSIS AUTO W/SCOPE: CPT

## 2025-02-21 PROCEDURE — G0378 HOSPITAL OBSERVATION PER HR: HCPCS

## 2025-02-21 PROCEDURE — 250N000013 HC RX MED GY IP 250 OP 250 PS 637: Performed by: PSYCHIATRY & NEUROLOGY

## 2025-02-21 RX ADMIN — OLANZAPINE 10 MG: 10 TABLET, ORALLY DISINTEGRATING ORAL at 20:01

## 2025-02-21 RX ADMIN — OLANZAPINE 10 MG: 10 TABLET, ORALLY DISINTEGRATING ORAL at 09:54

## 2025-02-21 ASSESSMENT — ACTIVITIES OF DAILY LIVING (ADL)
ADLS_ACUITY_SCORE: 54

## 2025-02-21 NOTE — ED NOTES
"Writer went into room after hearing pt yelling in room. Pt and writer was talking and writer was trying to calm down pt . Pt was handing writer a dry piece of noodle and stating it was a \"key\" as as she was placing it in writers hand she quickly grabbed and ripped writers bracelet off and threatened to eat it . Writer called for security and managed to take bracelet  back . Pt was able to deescalated slightly after and remained in room   "

## 2025-02-21 NOTE — ED PROVIDER NOTES
The patient was accepted at shift change signout pending psychiatry evaluation.  This will not happen today.  Per discussion at time of signout, the patient was placed on observation status.  Please use the ED note by Dr.'s Lo as well as this addendum as observation H&P.  The patient did have increasing agitation, and did take an additional 2 mg of Ativan orally.  She will be signed out at shift change pending the above.    Dictation Disclaimer: Some of this Note has been completed with voice-recognition dictation software. Although errors are generally corrected real-time, there is the potential for a rare error to be present in the completed chart.       Brenna Michael MD  02/20/25 [id number]

## 2025-02-21 NOTE — ED NOTES
IP MH Referral Acuity Rating Score (RARS)    LMHP complete at referral to IP MH, with DEC; and, daily while awaiting IP MH placement. Call score to PPS.  CRITERIA SCORING   New 74 HH and Involuntary for IP MH (not adolescent) 0/3   Boarding over 24 hours 1/1   Vulnerable adult at least 55+ with multiple co morbidities; or, Patient age 11 or under 0/1   Suicide ideation without relief of precipitating factors 0/1   Current plan for suicide 0/1   Current plan for homicide 0/1   Imminent risk or actual attempt to seriously harm another without relief of factors precipitating the attempt 0/1   Severe dysfunction in daily living (ex: complete neglect for self care, extreme disruption in vegetative function, extreme deterioration in social interactions) 1/1   Recent (last 2 weeks) or current physical aggression in the ED 0/1   Restraints or seclusion episode in ED 0/1   Verbal aggression, agitation, yelling, etc., while in the ED 0/1   Active psychosis with psychomotor agitation or catatonia 1/1   Need for constant or near constant redirection (from leaving, from others, etc).  0/1   Intrusive or disruptive behaviors 1/1   TOTAL 4

## 2025-02-21 NOTE — TELEPHONE ENCOUNTER
[place]: [place]  [facility] [place] Call Log 2/20/2025 @ 5:00 PM   Intake has called facilities that have not updated the bed status within the last 12 hours.          Pt wants [facility].  Prefers unit [address].      [facility] is posting 0 beds.               [facility] is posting 3 beds. [phone]; @ capacity per call 4:30 PM   [facility] is posting 0 beds.[phone]; Negative covid required.   [facility] is posting 0 beds. Neg covid. No high school/[facility]. [phone].   [facility] is posting 0 beds. [phone];    [facility] is posting 0 beds. [phone];    [facility] is posting 6 beds. (Ages 18-35) Negative covid, no aggression, physical or sexual assault, violence hx or drug abuse, or psychosis.  [phone];   [facility] is posting 0 beds.    [facility] is posting 0 beds [phone];      Pt remains on waitlist pending appropriate bed availability.

## 2025-02-21 NOTE — ED NOTES
Patient : Leigh Ann Bird Age: 71 year old Sex: female   MRN: [id number] Encounter Date: 4/4/2019      History     Chief Complaint   Patient presents with   • Nausea   • Acute Diaphoresis     71-year-old female sent from her primary doctor's office with an episode of nausea, abdominal pain, sweatiness. Patient didn't have anything to eat this morning. She took her medications. She was at the doctor's office and states her stomach got upset and she got nauseated. Patient felt she was going to vomit. Daughter states she started to get a little pale and sweaty. They were unable to check her glucose and sent to the ER. Glucose on arrival is okay. Patient states she's feeling fine at this time. She denies any pain or nausea. States she's feeling well at this time. She does not want any blood work done.      The history is provided by the patient.       Allergies   Allergen Reactions   • Furosemide RASH   • Lisinopril Other (See Comments)     Decreased kidney function   • Semaglutide VOMITING   • Tetanus Toxoid SWELLING       Current Discharge Medication List      Prior to Admission Medications    Details   gabapentin (NEURONTIN) 300 MG capsule TAKE 3 CAPSULES BY MOUTH EVERY EVENING AS DIRECTED  Qty: 270 capsule, Refills: 0      cefdinir (OMNICEF) 300 MG capsule Take 1 capsule by mouth 2 times daily for 10 days.  Qty: 20 capsule, Refills: 0      albuterol 108 (90 Base) MCG/ACT inhaler INHALE 2 PUFFS INTO THE LUNGS EVERY 4 HOURS AS NEEDED FOR SHORTNESS OF BREATH OR WHEEZING  Qty: 90 g, Refills: 1      bumetanide (BUMEX) 1 MG tablet TAKE 1 TABLET BY MOUTH DAILY  Qty: 90 tablet, Refills: 0      levothyroxine (SYNTHROID, LEVOTHROID) 88 MCG tablet TAKE 1 TABLET BY MOUTH EVERY DAY  Qty: 30 tablet, Refills: 0      atorvastatin (LIPITOR) 80 MG tablet TAKE 1 TABLET BY MOUTH EVERY NIGHT  Qty: 90 tablet, Refills: 0      metoPROLOL tartrate (LOPRESSOR) 50 MG tablet TAKE 1 AND 1/2 TABLETS BY MOUTH EVERY 12 HOURS  Qty: 270 tablet,  Writer gave patient oral lorazepam. Patient is yelling, screaming, grabbing security. Is directable with lots of encouragement. Provider updated.   Refills: 0      amiodarone (PACERONE,CORDARONE) 200 MG tablet TAKE 1 TABLET BY MOUTH DAILY  Qty: 90 tablet, Refills: 0      DULoxetine (CYMBALTA) 30 MG capsule TAKE 2 CAPSULES BY MOUTH DAILY  Qty: 60 capsule, Refills: 2      BASAGLAR KWIKPEN 100 UNIT/ML pen-injector INJECT 16 UNITS INTO THE SKIN EVERY MORNING  Qty: 15 mL, Refills: 3      omeprazole (PRILOSEC) 20 MG capsule TAKE ONE CAPSULE BY MOUTH DAILY  Qty: 90 capsule, Refills: 1      metformin (GLUCOPHAGE) 1000 MG tablet TAKE 1 TABLET BY MOUTH TWICE DAILY WITH MEALS  Qty: 180 tablet, Refills: 1      traZODone (DESYREL) 50 MG tablet Take 1 tablet by mouth at bedtime.  Qty: 90 tablet, Refills: 0      Semaglutide (OZEMPIC) (1.34 mg/ml) 0.25 or 0.5 MG/DOSE Solution Pen-injector Inject 0.25 mg into the skin 1 day a week.  Qty: 1.5 mL, Refills: 6      metoPROLOL tartrate (LOPRESSOR) 50 MG tablet Take two tablets by mouth twice daily to equal 100 mg BID (11/28/18)  Qty: 270 tablet, Refills: 0      rivaroxaban (XARELTO) 20 MG Tab Take 1 tablet by mouth daily (with dinner).  Qty: 30 tablet, Refills: 5      nitrofurantoin, macrocrystal-monohydrate, (MACROBID) 100 MG capsule Take 1 capsule by mouth daily.  Qty: 30 capsule, Refills: 3      Cholecalciferol (VITAMIN D3) 69804 units Tab Take 1 tablet by mouth 2 days a week. FOR 6 WEEKS, THEN DECREASE TO OVER THE COUNTER VITAMIN D3 2,000 IU DAILY.  Qty: 12 tablet, Refills: 0      levothyroxine (SYNTHROID, LEVOTHROID) 88 MCG tablet Take 1 tablet by mouth daily (before breakfast).  Qty: 90 tablet, Refills: 1      Insulin Pen Needle (B-D U/F PEN NEEDLE 5/16\") 31G X 8 MM Misc Use to inject insulin daily.  Qty: 100 each, Refills: 1      estradiol (ESTRACE VAGINAL) 0.1 MG/GM vaginal cream Place 2 grams vaginally daily for 2 weeks then 2 grams 2 times per week  Qty: 42.5 g, Refills: 12      DISPENSE 1 SEATED  WALKER.  Qty: 1 each, Refills: 0      fluticasone (FLONASE) 50 MCG/ACT nasal spray Spray 2 sprays in each nostril daily. FOR 1  WEEK, THEN DECREASE TO 1 SPRAY DAILY FOR 1 MONTH. DISCONTINUE AFTER 1 MONTH.  Qty: 16 g, Refills: 0      blood glucose test strip Test blood sugar 4 times daily as directed. Diagnosis: E11.9. Meter: One touch Verio  Qty: 400 strip, Refills: 12      nystatin-triamcinolone (MYCOLOG II) 955176-8.1 UNIT/GM-% ointment Apply topically 2 times daily.  Qty: 30 g, Refills: 0      hydroCORTisone (CORTIZONE) 1 % ointment Apply topically 2 times daily.  Qty: 30 g, Refills: 0      magnesium oxide 250 MG tablet Take 1 tablet by mouth daily. OVER THE COUNTER.  Qty: 90 tablet, Refills: 0      umeclidinium-vilanterol (ANORO ELLIPTA) 62.5-25 MCG/INH inhaler Inhale 1 puff into the lungs daily.  Qty: 60 each, Refills: 3      calcium carbonate (TUMS) 500 MG chewable tablet Chew 1 tablet by mouth daily as needed for Heartburn. CALLED TO [facility].      aspirin 81 MG tablet Take 81 mg by mouth daily. Taking at [place]      Lancets 30G Misc 4 times daily. Dx. E11.9  Qty: 100 each, Refills: 2      Blood Glucose Monitoring Suppl w/Device Kit Use to check blood sugars 4 times daily. One touch Verio. Dx. Ell.9  Qty: 1 kit, Refills: 0      potassium chloride (K-DUR,KLOR-CON) 20 MEQ CR tablet Take 1 tablet by mouth daily.  Qty: 90 tablet, Refills: 3      Elastic Bandages & Supports (MEDICAL COMPRESSION SOCKS) Misc 1 each daily.  Qty: 1 each, Refills: 1      Misc. Devices (FINGERTIP PULSE OXIMETER) MISC Use as directed.  Qty: 1 each, Refills: 0      albuterol-ipratropium 2.5 mg/0.5 mg (DUONEB) 0.5-2.5 (3) MG/3ML nebulizer solution Take 3 mLs by nebulization every 6 hours as needed for Wheezing or Shortness of Breath.  Qty: 120 vial, Refills: 12             Current Discharge Medication List          Past Medical History:   Diagnosis Date   • Bradycardia 2016    with sinus pauses   • Bronchitis    • Chronic kidney disease     mass left kidney   • Chronic pain     Back   • Cirrhosis, non-alcoholic (CMS/HCC)     G1F4 per liver  biopsy()   • COPD (chronic obstructive pulmonary disease) (CMS/HCC)    • Coronary artery disease 2017    severe   • Depression    • Fracture     left foot at 16 years old   • Gallstones     removed   • GERD (gastroesophageal reflux disease)    • Hepatic steatosis 2016   • Hyperlipidemia    • Hypertension    • Hypothyroidism    • Insomnia    • Junctional tachycardia (CMS/HCC)    • Mobility impaired     uses cane, walker or W/C @ times   • Morbid obesity (CMS/HCC)     BMI-45.36   • Neuropathy    • CASSIE on CPAP    • Osteoarthritis    • Pacemaker    • RA (rheumatoid arthritis) (CMS/HCC)    • RAD (reactive airway disease)    • S/P CABG x 3 2017   • Sinus node dysfunction (CMS/HCC)    • Thickened endometrium    • Type 2 diabetes mellitus (CMS/HCC)    • Urinary tract infection    • Wears dentures     Full upper   • Wears glasses    • Wears hearing aid        Past Surgical History:   Procedure Laterality Date   • CARDIAC CATHERIZATION  10/27/2017   • CARDIAC SURGERY      triple bypass   •  SECTION, CLASSIC  1968   • COLONOSCOPY W/ POLYPECTOMY  2011   • CORONARY ARTERY BYPASS GRAFT  11/2017    x3   • ECHOCARDIOGRAM     • FNA W/ CYTOLOGY  2018    L kidney   • HOLTER MONITOR     • IR KIDNEY BIOPSY Left N   • LAPAROSCOPY, CHOLECYSTECTOMY  2016    Missy, cholelithiais   with Liver Biopsy   • NM DANA PER RST/STRS PHARMACOLO     • PACEMAKER IMPLANT  2016   • TONSILLECTOMY AND ADENOIDECTOMY         Family History   Problem Relation Age of Onset   • Other Mother         blood clots in lung   • Rheumatoid Arthritis Mother    • Osteoporosis Mother    • Alcohol Abuse Father    • Diabetes Father    • Myocardial Infarction Brother    • Heart disease Brother    • Alcohol Abuse Brother         prescription drugs   • Diabetes Daughter    • Cancer Daughter         cervial-hysterectomy   • Alcohol Abuse Brother    • Heart disease Brother         stents placed   • Myocardial Infarction  Brother    • Diabetes Paternal Uncle    • Osteoporosis Maternal Grandmother        Social History     Tobacco Use   • Smoking status: Former Smoker     Packs/day: 3.00     Years: 40.00     Pack years: 120.00     Types: Cigarettes     Last attempt to quit: 1999     Years since quittin.2   • Smokeless tobacco: Never Used   Substance Use Topics   • Alcohol use: No     Alcohol/week: 0.0 oz   • Drug use: No       Review of Systems   Constitutional: Negative for chills and fever.   HENT: Negative for sore throat and trouble swallowing.    Eyes: Negative for discharge and redness.   Respiratory: Negative for cough, chest tightness, shortness of breath and wheezing.    Cardiovascular: Negative for chest pain and leg swelling.   Gastrointestinal: Positive for abdominal pain and nausea. Negative for diarrhea and vomiting.   Genitourinary: Negative for difficulty urinating, dysuria and flank pain.   Musculoskeletal: Negative for arthralgias, gait problem, neck pain and neck stiffness.   Skin: Negative for color change and rash.   Neurological: Negative for weakness and numbness.   Psychiatric/Behavioral: The patient is not nervous/anxious.        Physical Exam     ED Triage Vitals [19 1308]   ED Triage Vitals Group      Temp 96.7 °F (35.9 °C)      Pulse 65      Resp 18      /70      SpO2 97 %      EtCO2 mmHg       Height 5' 2\" (1.575 m)      Weight 257 lb (116.6 kg)      Weight Scale Used        Physical Exam   Constitutional: She is oriented to person, place, and time. She appears well-developed and well-nourished. No distress.   HENT:   Head: Normocephalic and atraumatic.   Eyes: Conjunctivae are normal. Pupils are equal, round, and reactive to light. Right eye exhibits no discharge. Left eye exhibits no discharge. No scleral icterus.   Neck: Neck supple.   Cardiovascular: Normal rate, regular rhythm, normal heart sounds and intact distal pulses. Exam reveals no gallop and no friction rub.   No murmur  heard.  Pulmonary/Chest: Effort normal and breath sounds normal. No respiratory distress. She has no wheezes. She has no rales.   Abdominal: Soft. Bowel sounds are normal. She exhibits no distension. There is no tenderness.   Musculoskeletal: Normal range of motion. She exhibits no edema or tenderness.   Neurological: She is alert and oriented to person, place, and time.   Skin: Skin is warm and dry. No rash noted. She is not diaphoretic. No erythema. No pallor.   Psychiatric: She has a normal mood and affect.   Nursing note and vitals reviewed.      ED Course     Procedures    Lab Results     Results for orders placed or performed during the hospital encounter of 04/04/19   Metered blood glucose   Result Value Ref Range    Glucose Bedside  (H) 65 - 99 mg/dL       EKG Results     EKG Interpretation 15:37  Rate: 65  Rhythm: a- paced   Abnormality: paced    EKG reviewed and interpreted by ED physician pending cardiology over read           MDM  18-year-old female had an episode of upper abdominal pain and nausea while at the doctor's office. At the time she arrived here felt better. Exam unrevealing. Not hypoglycemic. Patient wants to go home. Convinced her to get an EKG and PO challenge and make sure she is feeling better prior to discharge home.    4:02 PM. Patient's tolerating p.o. intake. She says she has no symptoms and feels well and wants to go home. Discharging home as planned.    Clinical Impression     ED Diagnosis   1. Abdominal pain, acute, epigastric     2. Nausea         Disposition        Discharge 4/4/2019  4:00 PM  Leigh Ann Bird discharge to home/self care.         Ignacio Cartagena MD  04/04/19 7130

## 2025-02-21 NOTE — PROGRESS NOTES
"Triage & Transition Services, Extended Care     Therapy Progress Note    Patient: Radha goes by \"Radha,\" uses she/her pronouns  Date of Service: February 21, 2025  Site of Service: [facility] Emergency Department                             ED16C  Patient was seen yes  Mode of Assessment: Virtual: iPad    Presentation Summary: Met with patient via Ipad.  Session started with pt individually.  Pt's  arrived and joined session.  Pt is alert and engaged.  She is tracking more appropriately.  She states she was finally able to get some sleep.  She is uncertain how long she slept. She reports feeling groggy. She shares that her racing thoughts have calmed some and are more in her head instead of being verbalized.  Pt continues to exhibit pressured speech.   verbalizes observing some improvement in patient's presentation.  Pt remains agreeable to inpatient admission and would benefit from futher stabilization    Therapeutic Intervention(s) Provided: Engaged in guided discovery, explored patient's perspectives and helped expand them through socratic dialogue.    Current Symptoms: anxious, racing thoughts difficulty concentrating anxious, racing thoughts inattentive, distractability (pressured speech.)      Mental Status Exam   Affect: Dramatic  Appearance: Appropriate  Attention Span/Concentration: Inattentive  Eye Contact: Variable    Fund of Knowledge: Appropriate   Language /Speech Content: Expressive Speech  Language /Speech Volume: Normal  Language /Speech Rate/Productions: Hyperverbal, Pressured  Recent Memory: Variable  Remote Memory: Poor  Mood: Anxious, Euphoric  Orientation to Person: Yes   Orientation to Place: No  Orientation to Time of Day: No  Orientation to Date: No     Situation (Do they understand why they are here?): Yes  Psychomotor Behavior: Normal  Thought Content: Other (please comment) (paranoia appears to show some improvement)  Thought Form:  (some improvement in " tangential speech)    Treatment Objective(s) Addressed: rapport building, orienting the patient to therapy, assessing safety    Patient Response to Interventions: verbalizes understanding    Progress Towards Goals: Patient Reports Symptoms Are: improving  Patient Progress Toward Goals: is making progress  Next Step to Work Toward Discharge: symptom stabilization    Case Management: Case Management Included: collaborating with patient's support system  Details on Collaborating with Patient's Support System: Patient's , Jose joined clinician's meeting with pt mid-session    Plan: inpatient mental health  yes (Dr Kiran) provider, RN    yes    Clinical Substantiation: Pt remains manic.  She has shown some improvement in tracking.  She reports getting some sleep and feeling groggy.  Pt has been getting psychiatric medication in the ED.  Some evidence of continued pressured speech.  Pt remains agreeable to inpatient admission    Legal Status: Legal Status: Voluntary/Patient has signed consent for treatment    Session Status: Time session started: 1115  Time session ended: 1133  Session Duration (minutes): 18 minutes  Session Number: 1  Anticipated number of sessions or this episode of care: 1    Time Spent: 18 minutes    CPT Code: CPT Codes: 50363 - Psychotherapy (with patient) - 30 (16-37*) min    Diagnosis:   Patient Active Problem List   Diagnosis Code    Unusual change in behavior R46.89    Psychosis, unspecified psychosis type (H) F29    Intractable vomiting R11.10    Phuong (H) F30.9    Bipolar I disorder, most recent episode (or current) manic (H) F31.10       Primary Problem This Admission: Active Hospital Problems    Phuong (H)      Bipolar I disorder, most recent episode (or current) manic (H)        Hien Wayne [facility]   Licensed Mental Health Professional (LMHP), [facility]  [phone]

## 2025-02-21 NOTE — ED PROVIDER NOTES
Emergency Department I-PASS Sign-out      Illness Severity: Stable    Patient Summary:  59 year old female who presented with altered mental status, lauren    ED Course/treatment plan: psychiatry consult pending    Clinical Impression:  Altered mental status    Edited by: Brenna Michael MD at 2025 2137    Action List:  Tests to Follow-up:  None    Medications Reconciled/Ordered:  Yes    ED Mental Health Boarding Order Set Used for Diet/PRNs/Other:  Yes    DEC, Extended Care, Psych Consult Orders:  Social work consult ordered    Situational Awareness & Contingency Plannin Hour Hold Status:  Voluntary, would reassess if wanting to leave.  Active Orders  N/A    Disposition:  Awaiting social work condsult    Boarding subsequent shift/day updates:  None    Edited by: Milton Collier MD at 2025 1718    Synthesis & Events after sign-out:  Patient improved but still manic today.  Continue plan for inpatient mental health admission.  No other acute events.        SAL GUY MD, MD   Emergency Medicine     Sal Guy MD  25 [phone]

## 2025-02-22 ENCOUNTER — HOSPITAL ENCOUNTER (INPATIENT)
Facility: CLINIC | Age: 60
End: 2025-02-22
Attending: STUDENT IN AN ORGANIZED HEALTH CARE EDUCATION/TRAINING PROGRAM | Admitting: PSYCHIATRY & NEUROLOGY
Payer: COMMERCIAL

## 2025-02-22 ENCOUNTER — TELEPHONE (OUTPATIENT)
Dept: BEHAVIORAL HEALTH | Facility: CLINIC | Age: 60
End: 2025-02-22
Payer: COMMERCIAL

## 2025-02-22 VITALS
RESPIRATION RATE: 16 BRPM | OXYGEN SATURATION: 96 % | DIASTOLIC BLOOD PRESSURE: 95 MMHG | SYSTOLIC BLOOD PRESSURE: 138 MMHG | TEMPERATURE: 98.1 F | HEART RATE: 129 BPM

## 2025-02-22 DIAGNOSIS — F30.9 MANIA (H): ICD-10-CM

## 2025-02-22 DIAGNOSIS — M62.838 MUSCLE SPASM: ICD-10-CM

## 2025-02-22 DIAGNOSIS — F31.10 BIPOLAR I DISORDER, MOST RECENT EPISODE (OR CURRENT) MANIC (H): Primary | ICD-10-CM

## 2025-02-22 PROCEDURE — 97150 GROUP THERAPEUTIC PROCEDURES: CPT | Mod: GO

## 2025-02-22 PROCEDURE — G0378 HOSPITAL OBSERVATION PER HR: HCPCS

## 2025-02-22 PROCEDURE — 250N000013 HC RX MED GY IP 250 OP 250 PS 637: Performed by: PSYCHIATRY & NEUROLOGY

## 2025-02-22 PROCEDURE — 250N000013 HC RX MED GY IP 250 OP 250 PS 637: Performed by: REGISTERED NURSE

## 2025-02-22 PROCEDURE — 124N000002 HC R&B MH UMMC

## 2025-02-22 RX ORDER — ACETAMINOPHEN 325 MG/1
650 TABLET ORAL EVERY 4 HOURS PRN
Status: CANCELLED | OUTPATIENT
Start: 2025-02-22

## 2025-02-22 RX ORDER — ATENOLOL 25 MG/1
25 TABLET ORAL DAILY
Status: DISPENSED | OUTPATIENT
Start: 2025-02-23

## 2025-02-22 RX ORDER — AMOXICILLIN 250 MG
1 CAPSULE ORAL 2 TIMES DAILY PRN
Status: CANCELLED | OUTPATIENT
Start: 2025-02-22

## 2025-02-22 RX ORDER — MAGNESIUM HYDROXIDE/ALUMINUM HYDROXICE/SIMETHICONE 120; 1200; 1200 MG/30ML; MG/30ML; MG/30ML
30 SUSPENSION ORAL EVERY 4 HOURS PRN
Status: CANCELLED | OUTPATIENT
Start: 2025-02-22

## 2025-02-22 RX ORDER — ACETAMINOPHEN 325 MG/1
650 TABLET ORAL EVERY 4 HOURS PRN
Status: DISCONTINUED | OUTPATIENT
Start: 2025-02-22 | End: 2025-02-25

## 2025-02-22 RX ORDER — ATORVASTATIN CALCIUM 10 MG/1
40 TABLET, FILM COATED ORAL DAILY
Status: DISPENSED | OUTPATIENT
Start: 2025-02-23

## 2025-02-22 RX ORDER — HYDROXYZINE HYDROCHLORIDE 25 MG/1
25 TABLET, FILM COATED ORAL EVERY 4 HOURS PRN
Status: CANCELLED | OUTPATIENT
Start: 2025-02-22

## 2025-02-22 RX ORDER — AMOXICILLIN 250 MG
1 CAPSULE ORAL 2 TIMES DAILY PRN
Status: ACTIVE | OUTPATIENT
Start: 2025-02-22

## 2025-02-22 RX ORDER — HYDROXYZINE HYDROCHLORIDE 25 MG/1
25 TABLET, FILM COATED ORAL EVERY 4 HOURS PRN
Status: DISPENSED | OUTPATIENT
Start: 2025-02-22

## 2025-02-22 RX ORDER — MAGNESIUM HYDROXIDE/ALUMINUM HYDROXICE/SIMETHICONE 120; 1200; 1200 MG/30ML; MG/30ML; MG/30ML
30 SUSPENSION ORAL EVERY 4 HOURS PRN
Status: DISPENSED | OUTPATIENT
Start: 2025-02-22

## 2025-02-22 RX ADMIN — OLANZAPINE 10 MG: 10 TABLET, ORALLY DISINTEGRATING ORAL at 08:33

## 2025-02-22 RX ADMIN — ALUMINUM HYDROXIDE, MAGNESIUM HYDROXIDE, AND SIMETHICONE 30 ML: 200; 200; 20 SUSPENSION ORAL at 15:10

## 2025-02-22 ASSESSMENT — ACTIVITIES OF DAILY LIVING (ADL)
HYGIENE/GROOMING: INDEPENDENT
ADLS_ACUITY_SCORE: 38
ADLS_ACUITY_SCORE: 54
ADLS_ACUITY_SCORE: 38
ADLS_ACUITY_SCORE: 38
ADLS_ACUITY_SCORE: 54
ADLS_ACUITY_SCORE: 38
ADLS_ACUITY_SCORE: 38
ADLS_ACUITY_SCORE: 54
ADLS_ACUITY_SCORE: 38
ADLS_ACUITY_SCORE: 54
ADLS_ACUITY_SCORE: 38
HYGIENE/GROOMING: INDEPENDENT
ADLS_ACUITY_SCORE: 54
ADLS_ACUITY_SCORE: 64
ADLS_ACUITY_SCORE: 54
ADLS_ACUITY_SCORE: 54
ADLS_ACUITY_SCORE: 64
DRESS: INDEPENDENT
ORAL_HYGIENE: INDEPENDENT
ADLS_ACUITY_SCORE: 54
ADLS_ACUITY_SCORE: 64
ADLS_ACUITY_SCORE: 38
ADLS_ACUITY_SCORE: 54
LAUNDRY: UNABLE TO COMPLETE
ADLS_ACUITY_SCORE: 54
ADLS_ACUITY_SCORE: 64
ADLS_ACUITY_SCORE: 38
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
## 2025-02-22 NOTE — PROGRESS NOTES
Pt c/o stomach ache and indigestion. PRN Maalox administered.     She also c/o chronic pain, exacerbated. She request and was given heat packs.

## 2025-02-22 NOTE — PLAN OF CARE
"Report taken from LOPEZ Matos, pt arrived to  12 soon after in wc accompanied by staff. Pt gait balanced and steady. Pt arrived in street clothes with a bag of belongings and a small stuffed animal. Pt ambulated to exam room where pt complied with search and changed to into scrubs without issue.    Pt presents as hyperverbal, easily distractible, with rambling loud speech. Pt mood is labile with congruent affect. Pt understanding for admission reported as \"I saw Mat () was very concerned bc I began not sleeping and my speech is like it was when I came in 10 years ago\". Pt reports worsening sleep patterns over the last 1.5 weeks, racing thoughts and inability to concentrate. During initial assessment pt was easily distracted but redirectable. Pt states she has racing thoughts a majority of the time and then the rest of the time she is \"overthinking\" things. Pt denies depression, anxiety and A/VH.     Pt showered and brushed teeth, participated in OT therapy with appropriate socialization. Pt was visible in the milieu visiting with peers.  visited which went well.    VS obtained at admission, see progress note from 2/22/25 @1143. Pt BP slightly elevated and tachycardic, *see vitals tab. 1420 vitals recheck showed lowered BP and decreased HR. *see vitals tab.    Food and fluid intake adequate. No medications given this shift.    Legal status is voluntary.    VS reviewed: BP (!) 143/92 (BP Location: Left arm, Patient Position: Sitting, Cuff Size: Adult Regular)   Temp 98  F (36.7  C) (Temporal)   Resp 16   SpO2 98%  . Patient reports pain.    Length of stay: 0  "

## 2025-02-22 NOTE — PROGRESS NOTES
"   02/22/25 0942   Patient Belongings   Did you bring any home meds/supplements to the hospital?  No   Patient Belongings locker   Patient Belongings Put in Hospital Secure Location (Security or Locker, etc.) clothing   Belongings Search Yes   Clothing Search Yes   Second Staff Yvette PELLETIER RN     Placed in locker labeled with pt's name:    Small stuffed tiger  1 white bra  9  pr of socks  4 pr of white underwear (briefs)  1 \"Pizza Comet\" shirt  1 zebra shirt  1 lobster tanktop  1 green shirt   1 pr of black pants  1 pack of Splenda (in envelope per pt's request)    Pt had no valuables, purse, money, credit cards, or cellphone in belongings    A               Admission:  I am responsible for any personal items that are not sent to the safe or pharmacy.  [facility] is not responsible for loss, theft or damage of any property in my possession.    Signature:  _________________________________ Date: _______  Time: _____                                              Staff Signature:  ____________________________ Date: ________  Time: _____      2nd Staff person, if patient is unable/unwilling to sign:    Signature: ________________________________ Date: ________  Time: _____     Discharge:  [facility] has returned all of my personal belongings:    Signature: _________________________________ Date: ________  Time: _____                                          Staff Signature:  ____________________________ Date: ________  Time: _____          "

## 2025-02-22 NOTE — TELEPHONE ENCOUNTER
R:  Writer called unit 12 @ 7:16am to inquire of a case x case review for placement.  Charge on nights answerted and reported she already reviewed pt and gave ok for presenting.    Writer will page provider at 8am to present for unit 12.     PPS Writer paged on call @ 8:01am to had pt reviewed for unit 12.     On call provider: Tommy  Called intake back @ 8:38am and accepted pt for unit 12/[place]    Pt placed in units queue @ 8:43am    Intake called unit 12 to inform Pt in queue @ 8:44am    [facility] Updated with olacement.    Pt added to admit board.    Indicia completed:

## 2025-02-22 NOTE — PROGRESS NOTES
1100 VS assessed at time of admission: 143/92, , RR 16, even and unlabored, 98% ra, P - 5/10 (chronic back pain). Pt denies dizziness, headache, chest pain. 1115 On call provider notified via text page. Awaiting response.

## 2025-02-22 NOTE — TELEPHONE ENCOUNTER
R: [place]  Access Inpatient Bed Call Log 2/22/2025 @ 1:00 AM  Intake has called facilities that have not updated the bed status within the last 12 hours.    Asults:    [facility] is posting 0 beds.  [facility] is posting 3 beds. [phone].  [facility] is posting 0 beds.[phone]; Negative covid required.  [facility] is posting 0 beds. Neg covid. No high school/Kori-psych. [phone].  [facility] is posting 0 beds. [phone].  [facility] is posting 0 beds. [phone].  [facility] is posting 6 beds. (Ages 18-35) Negative covid, no aggression, physical or sexual assault, violence hx or drug abuse, or psychosis.  [phone].  [facility] is posting 0 beds.  [facility] is posting 0 beds [phone].      Pt remains on the work list pending appropriate bed availability.

## 2025-02-22 NOTE — ED NOTES
"Pt was hyper verbal with rambling and tangential speech throughout the night. Pt got minimal sleep and spent most of the night talking with staff or to herself. Pt pleasant. Fixated on switching rooms because she \"feels like that is where (she) needs to be\". Staff explained to pt that all rooms are occupied, pt expressed understanding. RR WNL, even, and unlabored throughout the night.   "

## 2025-02-22 NOTE — ED PROVIDER NOTES
Emergency Department I-PASS Sign-out      Illness Severity: Stable    Patient Summary:  59 year old female who presented with altered mental status, lauren    ED Course/treatment plan: psychiatry consult pending    Clinical Impression:  Altered mental status    Edited by: Brenna Michael MD at 2025    Action List:  Tests to Follow-up:  None    Medications Reconciled/Ordered:  Yes    ED Mental Health Boarding Order Set Used for Diet/PRNs/Other:  Yes    DEC, Extended Care, Psych Consult Orders:  Social work consult ordered    Situational Awareness & Contingency Plannin Hour Hold Status:  Voluntary, would reassess if wanting to leave.  Active Orders  N/A    Disposition:  Awaiting social work condsult    Boarding subsequent shift/day updates:  None      Synthesis & Events after sign-out:  No acute events my shift        Brennen Raines MD   Emergency Medicine     Brennen Raines MD  25 0730

## 2025-02-22 NOTE — PLAN OF CARE
Rehab Group    Start time: 11:25  End time: 12:00  Patient time total: 25 minutes    attended partial group    #5 attended   Group Type: occupational therapy   Group Topic Covered: cognitive activities, coping skills, healthy leisure time, and social skills     Group Session Detail:  The focus of today's group was leisure exploration and engagement. Patient engaged in a therapeutic game ([employer]) to encourage new learning, problem solving, focus, socialization, and cognitive wellness. This game encouraged cognitive skill building, such as: initiation, planning, organization, sequencing, and attention.        Patient Response/Contribution:  worked intermittently, engaged socially when prompted, required repetition of directions, and hyper-verbal.     Patient Detail:  Patient demonstrated a willingness to learn and engage in a novel leisure activity with peers. Patient presented with a bright affect with a pleasant demeanor. Patient was hyper-verbal throughout group; appearing to get easily distracted by conversation with peers and environmental stimuli; requiring consistent redirection to the task at hand. Patient listened actively to the instructions of the activity and asked appropriate clarifying questions. Patient appeared to follow the advice and cueing from peers as opposed to making independent strategic decisions as the game progressed. Patient offered a compliment to a female peer regarding her hairstyle; however, the compliment was not well received. Patient offered an abundance of apologies to the peer; appearing very authentic in her response.       [id number] OT Group (2 or more in attendance)      Patient Active Problem List   Diagnosis    Unusual change in behavior    Psychosis, unspecified psychosis type (H)    Intractable vomiting    Phuong (H)    Bipolar I disorder, most recent episode (or current) manic (H)

## 2025-02-23 LAB
HOLD SPECIMEN: NORMAL
VIT D+METAB SERPL-MCNC: 30 NG/ML (ref 20–50)

## 2025-02-23 PROCEDURE — 99222 1ST HOSP IP/OBS MODERATE 55: CPT | Performed by: ANESTHESIOLOGY

## 2025-02-23 PROCEDURE — 83036 HEMOGLOBIN GLYCOSYLATED A1C: CPT

## 2025-02-23 PROCEDURE — 250N000013 HC RX MED GY IP 250 OP 250 PS 637: Performed by: REGISTERED NURSE

## 2025-02-23 PROCEDURE — 250N000013 HC RX MED GY IP 250 OP 250 PS 637: Performed by: ANESTHESIOLOGY

## 2025-02-23 PROCEDURE — 82306 VITAMIN D 25 HYDROXY: CPT | Performed by: ANESTHESIOLOGY

## 2025-02-23 PROCEDURE — 36415 COLL VENOUS BLD VENIPUNCTURE: CPT | Performed by: ANESTHESIOLOGY

## 2025-02-23 PROCEDURE — 124N000002 HC R&B MH UMMC

## 2025-02-23 RX ORDER — LORAZEPAM 1 MG/1
1 TABLET ORAL EVERY 4 HOURS PRN
Status: DISPENSED | OUTPATIENT
Start: 2025-02-23

## 2025-02-23 RX ORDER — OXCARBAZEPINE 150 MG/1
150 TABLET, FILM COATED ORAL 2 TIMES DAILY
Status: DISCONTINUED | OUTPATIENT
Start: 2025-02-23 | End: 2025-02-27

## 2025-02-23 RX ORDER — ASPIRIN 81 MG/1
81 TABLET, CHEWABLE ORAL DAILY
Status: DISPENSED | OUTPATIENT
Start: 2025-02-23

## 2025-02-23 RX ADMIN — ATORVASTATIN CALCIUM 40 MG: 10 TABLET, FILM COATED ORAL at 09:26

## 2025-02-23 RX ADMIN — ACETAMINOPHEN 650 MG: 325 TABLET, FILM COATED ORAL at 14:47

## 2025-02-23 RX ADMIN — OXCARBAZEPINE 150 MG: 150 TABLET, FILM COATED ORAL at 12:14

## 2025-02-23 RX ADMIN — OXCARBAZEPINE 150 MG: 150 TABLET, FILM COATED ORAL at 19:56

## 2025-02-23 RX ADMIN — ATENOLOL 25 MG: 25 TABLET ORAL at 09:26

## 2025-02-23 RX ADMIN — ASPIRIN 81 MG CHEWABLE TABLET 81 MG: 81 TABLET CHEWABLE at 12:14

## 2025-02-23 ASSESSMENT — ACTIVITIES OF DAILY LIVING (ADL)
ADLS_ACUITY_SCORE: 38

## 2025-02-23 NOTE — PLAN OF CARE
"Pt mood presents as less elevated as compared to yesterday. Pt is less hyperverbal and distractible and appears less anxious. Pt states she slept well last pm and woke up feeling rested. Pt c/o back pain but declined PRN tylenol and opted for heat packs instead which were reported to be beneficial. Pt continues to deny SI, SIB, HI and thoughts of harming others. Pt denies depression, anxiety and A/VH. Pt denies racing thoughts and says that her mind \"is quieting down some\".     Pt requested to wear her own underwear from her locker. Order rec'd from on-call provider and 1 pair of white underwear given to pt.     Pt was medication compliant. Pt showered and brushed teeth. Food and fluid intake adequate. Pt's  visited, visit appeared to go well. No acute behavioral or physical concerns this day.    1450 PRN tylenol 650mg given for c/o back pain 7/10 along with 2 hot packs.    VS reviewed: /89 (BP Location: Left arm)   Pulse 117   Temp 97.6  F (36.4  C) (Temporal)   Resp 16   Ht 1.651 m (5' 5\")   Wt 113.9 kg (250 lb 15.9 oz)   SpO2 97%   BMI 41.77 kg/m   . Patient reports pain.    Length of stay: 1  "

## 2025-02-23 NOTE — PLAN OF CARE
Radha  appeared sleeping  for  3.25 hours without issue  Utilized 5 hot packs upon request for chronic pain which the pt does not know the location  No aggressive  behaviors demonstrated  No SI, delusions, or hallucination noted or reported this shift.          Problem: Adult Behavioral Health Plan of Care  Goal: Plan of Care Review  Outcome: Progressing  Goal: Adheres to Safety Considerations for Self and Others  Intervention: Develop and Maintain Individualized Safety Plan  Recent Flowsheet Documentation  Taken 2/23/2025 0148 by Mariama Calvillo, RN  Safety Measures: safety rounds completed   Goal Outcome Evaluation:

## 2025-02-23 NOTE — PLAN OF CARE
Nursing assessment completed. Patient awake and visible in the lounge for the majority of the evening. Engaging with staff and peers. Needing redirection at times for intrusiveness towards others. Patient accepting. Speech hyper verbal. Writer spoke to on call provider (Antonio) to confirm patient is not currently on any mental health medications. Patient will be seen by provider tomorrow. Patient states she has only been taking her atenolol and Lipitor, which are now schedule for tomorrow am. Patient requested egg crate mattress and order obtained. Writer helped pt place egg crate mattress on bed. Patient spent evening watching TV and socializing in the lounge with peers. No scheduled or PRN medications this shift. Denies SI/SIB or thoughts to harm others. Continue to monitor and assess.

## 2025-02-24 PROCEDURE — 97150 GROUP THERAPEUTIC PROCEDURES: CPT | Mod: GO

## 2025-02-24 PROCEDURE — 124N000002 HC R&B MH UMMC

## 2025-02-24 PROCEDURE — 250N000013 HC RX MED GY IP 250 OP 250 PS 637: Performed by: ANESTHESIOLOGY

## 2025-02-24 PROCEDURE — 99232 SBSQ HOSP IP/OBS MODERATE 35: CPT | Mod: GC | Performed by: PSYCHIATRY & NEUROLOGY

## 2025-02-24 PROCEDURE — 250N000013 HC RX MED GY IP 250 OP 250 PS 637: Performed by: REGISTERED NURSE

## 2025-02-24 RX ADMIN — OXCARBAZEPINE 150 MG: 150 TABLET, FILM COATED ORAL at 20:28

## 2025-02-24 RX ADMIN — ASPIRIN 81 MG CHEWABLE TABLET 81 MG: 81 TABLET CHEWABLE at 09:55

## 2025-02-24 RX ADMIN — Medication 3 MG: at 23:37

## 2025-02-24 RX ADMIN — ATORVASTATIN CALCIUM 40 MG: 10 TABLET, FILM COATED ORAL at 09:55

## 2025-02-24 RX ADMIN — OXCARBAZEPINE 150 MG: 150 TABLET, FILM COATED ORAL at 09:55

## 2025-02-24 RX ADMIN — ATENOLOL 25 MG: 25 TABLET ORAL at 09:55

## 2025-02-24 ASSESSMENT — ACTIVITIES OF DAILY LIVING (ADL)
ADLS_ACUITY_SCORE: 38

## 2025-02-24 NOTE — CARE PLAN
Rehab Group    Start time: 1030  End time: 1200  Patient time total: 45 minutes    attended partial group     #3 attended   Group Type: OT Clinic   Group Topic Covered: activity therapy, coping skills, and social skills     Group Session Detail:  Pt attended OT clinic group, was able to initiate task and ask for help as needed. Pt demonstrated good planning, task focus, and problem solving. Appeared comfortable interacting with peers.     Patient Response/Contribution:  cooperative with task       Patient Detail:    Friendly and social in group, open about past manic experience and symptoms. 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  Pt appeared insightful into her needs.  Pt is grateful for her supports, especially her  and therapist.    Engaged in aj dotting task activity which she found relaxing.      [id number] OT Group (2 or more in attendance)    Patient Active Problem List   Diagnosis    Unusual change in behavior    Psychosis, unspecified psychosis type (H)    Intractable vomiting    Phuong (H)    Bipolar I disorder, most recent episode (or current) manic (H)

## 2025-02-24 NOTE — PLAN OF CARE
Nursing assessment completed. Patient awake and visible throughout the shift. Showered in am. Completed ADLs independently. Patient visible in the lounge with peers. Attending and participating in groups. Patient appears to be less hyper verbal this shift than previous shifts. Appears to be unsure of what to do at times and frequently asks staff where she is supposed to be doing. Poor memory recall. She denies current symptoms. Denies side effects. Started trileptal yesterday. No PRNs requested of received. Continue to monitor and assess.

## 2025-02-24 NOTE — PLAN OF CARE
"Individual Therapy Note      Date of Service: February 24, 2025    Patient: Radha goes by \"Radha,\" uses she/her pronouns    Individuals Present: Radha & Lesia Fam [facility]    Session start: 1319  Session end: 1404  Session duration in minutes: 45      Modality Used: Person Centered and Brief Therapy    Goals: Process feelings related to diagnosis    Patient Description of current symptoms: \"I feel like it was a snap judgment, I don't think I have bipolar.\"      Mental Status Exam:   Attitude: cooperative  Eye Contact: good  Mood: good  Affect: mood congruent  Speech: normal prosody  Psychomotor Behavior: no evidence of tardive dyskinesia, dystonia, or tics  Thought Process:  logical, linear, and goal oriented  Associations: no loose associations  Thought Content: no evidence of psychotic thought  Insight: fair  Judgment: fair  Attention Span and Concentration: fair    Pt progress: First session    Treatment Objective(s) Addressed:   The focus of this session was on processing feelings related to diagnosis.       Progress Towards Goals and Assessment of Patient:   Radha shared her dissatisfaction with a bipolar diagnosis, stating it was a \"snap judgment\" on behalf of her outpatient psychiatrist. She explained that her brother has a bipolar diagnosis and she is aware of what a manic episode looks like, and she is not experiencing one.     She was difficult to redirect, but pleasant throughout our session. Writer asked a couple of questions and each time she was unable to answer directly (though did acknowledge that she had digressed). She stated she is here because her  is comforted by her admission. Appears to have strong social support in her .     Overall theme was feeling misunderstood by her treatment team, primarily her outpatient psychiatrist. Reported concerns about taking psychiatric medications because of her outpatient experience.     Writer attempted to explore her experience with THC. She " "acknowledged that she uses it but could not stay on the topic long enough to discuss this further.     She appears hesitant to accept a bipolar diagnosis, though readily shared that she experienced psychosis during her last hospitalization.     We discussed a 14 day period without sleep that precipitated her last hospitalization. She stated this was \"forced insomnia\" that turned into a \"cycle\" of insomnia.    Therapeutic Intervention(s):   Provided active listening, unconditional positive regard, and validation. Understand and identify reasons for hospitalization, how to use the hospital to create change and prevent future hospitalizations. Reviewed healthy living that supports positive mental health, including looking at sleep hygiene, regular movement, nutrition, and regular socialization.    Plan/next step: Writer will remain available for individual psychotherapy.     49416 - Psychotherapy (with patient) - 45 (38-52*) min    Patient Active Problem List   Diagnosis    Unusual change in behavior    Psychosis, unspecified psychosis type (H)    Intractable vomiting    Phuong (H)    Bipolar I disorder, most recent episode (or current) manic (H)       "

## 2025-02-24 NOTE — PLAN OF CARE
Group Attendance:  attended full group    Time session began: 1615  Time session ended: 1650  Patient's total time in group: 35    Total # Attendees   5   Group Type psychotherapeutic     Group Topic Covered insight improvement, goals and motivation, mindfulness, and relationships and boundaries     Group Session Detail Exploring values by engaging in group discussion to help individuals gain a deeper understanding of their core beliefs and principles.     Patient's response to the group topic/interactions:  cooperative with task, listened actively, attentive, and actively engaged     Patient Details: Pt joined group for the last 3 minutes. Appeared eager yet hesitant to join group, caught herself talking over others, shared she values helping people, strength, human connection.            No Charge (0-15 min)  Patient Active Problem List   Diagnosis    Unusual change in behavior    Psychosis, unspecified psychosis type (H)    Intractable vomiting    Phuong (H)    Bipolar I disorder, most recent episode (or current) manic (H)

## 2025-02-24 NOTE — TELEPHONE ENCOUNTER
11:30 AM  Intake received call from Dr. Sears to add pt on work list for transfer. Pt is manic, intrusive on others and to vulnerable for station 12. Pt added to [facility] work list.    [place]  [place]patient [place] Call Log 2/24/2025 @  8:45 AM:       Intake has called facilities that have not updated the bed status within the last 12 hours.                       [facility] Only              [facility] is posting 0 beds.    Pt remains on work list pending appropriate bed availability.

## 2025-02-24 NOTE — PLAN OF CARE
Radha was awake most of the night. She sat down quietly in the lounge without reported issue.  appeared sleeping intermittently for 2  hours without distress  Hot packs  x2 provided to pt upon request for back pain and was helpful  No SI, delusions, or hallucination noted or reported this shift  Radha was calm without an agitated behavior this night.     Problem: Adult Behavioral Health Plan of Care  Goal: Plan of Care Review  Outcome: Progressing  Goal: Adheres to Safety Considerations for Self and Others  Intervention: Develop and Maintain Individualized Safety Plan  Recent Flowsheet Documentation  Taken 2/24/2025 0403 by Mariama Calvillo RN  Safety Measures: safety rounds completed     Problem: Adult Inpatient Plan of Care  Goal: Plan of Care Review  Description: The Plan of Care Review/Shift note should be completed every shift.  The Outcome Evaluation is a brief statement about your assessment that the patient is improving, declining, or no change.  This information will be displayed automatically on your shift  note.  Outcome: Progressing   Goal Outcome Evaluation:

## 2025-02-24 NOTE — PLAN OF CARE
Nursing assessment completed. Patient awake and visible throughout the shift watching TV and playing a game with peers in the lounge. She presents with a full range affect, hyper verbal at times. Patient appears to have poor memory recall as she was not sure if she was admitted yesterday or which day she started medications. She started trileptal  this morning and took her second dose this evening. She reports no side effects at this time. She is calm and cooperative. Denies SI/SIB or thoughts to harm others. Medication compliant. Continue to monitor and assess.

## 2025-02-24 NOTE — PROVIDER NOTIFICATION
02/24/25 1106   Individualization/Patient Specific Goals   Patient Personal Strengths family/social support;positive attitude   Patient Vulnerabilities other (see comments)   Interprofessional Rounds   Participants nursing;CTC;psychiatrist;pharmacy   Behavioral Team Discussion   Participants MD Mehrdad, Dr Tasneem Ross MD, Dr Hay DAVAOLS, Pharmacy: [facility], Nursing: Aliya CTC: Nakita Lance Pt arrived to [facility] on Sat 2/22/25. Reason for admission is manic episode. She was exhibiting pressured speech, paranoia, delusions, racing thoughts, not feeling the need to sleep. Per chart, she has been off her meds for 8 months.     Per team, pt has been cooperative, pleasant, behaviorally controlled. Pt hyper verbal at times.   Medical/Physical See H&P   Precautions None   Plan Stabilize on medications. Find safe dispo plan.   Safety Plan To be completed with unit psychotherapist prior to discharge.   Anticipated Discharge Disposition home with family     Goal Outcome Evaluation:                    PRECAUTIONS AND SAFETY    Behavioral Orders   Procedures    Code 1 - Restrict to Unit    Routine Programming     As clinically indicated    Status 15     Every 15 minutes.       Safety  Safety WDL: WDL  Patient Location: patient room, own  Observed Behavior: calm  Observed Behavior (Comment): socializing appropriately with staff and peers/participating in group therapy  Safety Measures: safety rounds completed  Diversional Activity: art work, television  Suicidality: Status 15

## 2025-02-25 ENCOUNTER — TELEPHONE (OUTPATIENT)
Dept: BEHAVIORAL HEALTH | Facility: CLINIC | Age: 60
End: 2025-02-25
Payer: COMMERCIAL

## 2025-02-25 PROCEDURE — 250N000013 HC RX MED GY IP 250 OP 250 PS 637: Performed by: ANESTHESIOLOGY

## 2025-02-25 PROCEDURE — 250N000013 HC RX MED GY IP 250 OP 250 PS 637: Performed by: REGISTERED NURSE

## 2025-02-25 PROCEDURE — 124N000002 HC R&B MH UMMC

## 2025-02-25 PROCEDURE — 97150 GROUP THERAPEUTIC PROCEDURES: CPT | Mod: GO

## 2025-02-25 PROCEDURE — 250N000013 HC RX MED GY IP 250 OP 250 PS 637: Performed by: STUDENT IN AN ORGANIZED HEALTH CARE EDUCATION/TRAINING PROGRAM

## 2025-02-25 RX ORDER — ACETAMINOPHEN 325 MG/1
975 TABLET ORAL EVERY 4 HOURS PRN
Status: DISCONTINUED | OUTPATIENT
Start: 2025-02-25 | End: 2025-02-25

## 2025-02-25 RX ORDER — ACETAMINOPHEN 325 MG/1
975 TABLET ORAL EVERY 6 HOURS PRN
Status: DISPENSED | OUTPATIENT
Start: 2025-02-25

## 2025-02-25 RX ADMIN — ASPIRIN 81 MG CHEWABLE TABLET 81 MG: 81 TABLET CHEWABLE at 08:55

## 2025-02-25 RX ADMIN — OXCARBAZEPINE 150 MG: 150 TABLET, FILM COATED ORAL at 08:55

## 2025-02-25 RX ADMIN — LORAZEPAM 1 MG: 1 TABLET ORAL at 00:41

## 2025-02-25 RX ADMIN — OXCARBAZEPINE 150 MG: 150 TABLET, FILM COATED ORAL at 20:10

## 2025-02-25 RX ADMIN — ATORVASTATIN CALCIUM 40 MG: 10 TABLET, FILM COATED ORAL at 08:55

## 2025-02-25 RX ADMIN — ACETAMINOPHEN 650 MG: 325 TABLET, FILM COATED ORAL at 01:15

## 2025-02-25 RX ADMIN — ACETAMINOPHEN 650 MG: 325 TABLET, FILM COATED ORAL at 12:57

## 2025-02-25 RX ADMIN — ACETAMINOPHEN 325 MG: 325 TABLET, FILM COATED ORAL at 13:30

## 2025-02-25 RX ADMIN — ACETAMINOPHEN 975 MG: 325 TABLET, FILM COATED ORAL at 22:34

## 2025-02-25 RX ADMIN — ALUMINUM HYDROXIDE, MAGNESIUM HYDROXIDE, AND SIMETHICONE 30 ML: 200; 200; 20 SUSPENSION ORAL at 23:51

## 2025-02-25 RX ADMIN — ATENOLOL 25 MG: 25 TABLET ORAL at 08:55

## 2025-02-25 ASSESSMENT — ACTIVITIES OF DAILY LIVING (ADL)
ADLS_ACUITY_SCORE: 38
HYGIENE/GROOMING: INDEPENDENT;SHOWER
ADLS_ACUITY_SCORE: 38
DRESS: SCRUBS (BEHAVIORAL HEALTH)
LAUNDRY: UNABLE TO COMPLETE
ADLS_ACUITY_SCORE: 38
DRESS: SCRUBS (BEHAVIORAL HEALTH)
ADLS_ACUITY_SCORE: 38
ORAL_HYGIENE: INDEPENDENT
ADLS_ACUITY_SCORE: 38
HYGIENE/GROOMING: INDEPENDENT;SHOWER
ADLS_ACUITY_SCORE: 38
LAUNDRY: UNABLE TO COMPLETE
ADLS_ACUITY_SCORE: 38
ORAL_HYGIENE: INDEPENDENT

## 2025-02-25 NOTE — TELEPHONE ENCOUNTER
[place]: [place]  Access Inpatient Bed Call Log 2/25/2025 @ 9:05 AM:       Intake has called facilities that have not updated the bed status within the last 12 hours.                                  [facility] is posting 0 beds.                Pt remains on the work list pending appropriate bed availability for transfer.

## 2025-02-25 NOTE — CARE PLAN
"  Rehab Group    Start time: 1030  End time: 1115  Patient time total: 30 minutes    attended partial group    #5 attended   Group Type: general health and coping   Group Topic Covered: coping skills  Self care, setting priorities   Group Session Detail:  Discussion with worksheet all prompting participants to explore defining priorities.  Discussion about when you feel most unsettled, what doesn't support you, etc,  all followed up by when you feel most content, identify priorities, how to honor set priorities daily/weekly/monthly, and how to stay accountable.     Patient Response/Contribution:  attentive, left group on several occasions, and distracted      Patient Detail:    Pt completed worksheet, eager to discuss answers.  Hyperverbal, though pt was aware of this, often asking \"am I talking too much?\"  Often references  previous manic episode, not wanting to go through a similar experience.  Pt states she is happiest and most at ease when with her , and most unsettled when people don't listen to her, and discount her thoughts/feelings, especially about previous experience with certain medications.   The things that don't support her are: bad people with bad intentions and people that don't understand the pain she is in (physical and emotional).    To honor her priorities pt will focus on:   \"Focusing on myself\", understanding the process may be slow, and guidance from Tania.  Pt needed assistance with boundaries as she wanted to assist peer with housing supports.  When she was told he has a  here, she asked to talk to his , though was told that is not necessary, and reminded her that she is focusing on herself, though thanked her for her concern, etc.        [id number] OT Group (2 or more in attendance)  Patient Active Problem List   Diagnosis    Unusual change in behavior    Psychosis, unspecified psychosis type (H)    Intractable vomiting    Phuong (H)    Bipolar I disorder, " most recent episode (or current) manic (H)

## 2025-02-25 NOTE — TELEPHONE ENCOUNTER
[facility] ONLY: TRANSFER  [place] [facility] [place]patient Bed Call [place]  2/25/25 @ 1:00am    [facility]: @ capacity for Adult MH beds.     Pt remains on waitlist pending appropriate placement availability.

## 2025-02-25 NOTE — PLAN OF CARE
"Patient out and active in the unge, attending groups and social with both peers and staff this shift. She showered this morning and appeared well groomed, wearing scrubs. Affect animated, elevated, labile, and tense at times. Mood anxious. Speech pressured, rambling and tangential. She denied hallucinations. Insight poor. Discussed with her the medication that was brought in by her  last night. Patient was adamant that her  only brought in Pepto Bismol and there were not other prescription medications or marijuana/other street drugs brought in. Discussed with team and order placed that visitors restricted to [facility] under staff supervision only. No outside items to be brought in for patient unless approved by staff. Patient became tearful and angry when talking about bipolar diagnosis and that this was not a diagnosis for her. 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 Later, patient quickly shifted from happy to tense and angry (balling up fists with intense stare) to tearful when discussing how she had intentionally broken a pair of headphones last night in frustration over not being able to initiate sleep due to uncomfortable mattress on the bed. She made a sort of recliner she chose to nap in using a chair and a wedge pillow for her legs. Suggested she try taking a nap this afternoon. Prn ativan offered and declined for agitation. Prn Tylenol 650 mg given for low back pain. She requested a dose increase which was done and additional 325 mg given. Pain decreased from 10 to a 5 after Tylenol. Backgam[employer] set brought in by  put in locker as straps were longer than 6 inches. She napped for less than one hour this afternoon.     Problem: Manic Symptoms  Goal: Manic Symptoms  Description: Signs and symptoms of listed problems will be absent or manageable.  Outcome: Not Progressing  Flowsheets " (Taken 2/25/2025 1124)  Manic Symptoms Assessed: all  Manic Symptoms Present:   anxiety   insight   thought process   sleep   affect   mood   speech   other (see comment)   Goal Outcome Evaluation:    Plan of Care Reviewed With: patient

## 2025-02-25 NOTE — PROGRESS NOTES
"  -------------------------------------------------------------------------------------  [facility], [place]   Psychiatric Progress Note  Hospital Day #2  Date of Service: 02/24/2025    Interval History:  The patient's care was discussed with the treatment team and chart notes were reviewed.    Sleep: 2 hours (02/24/25 0638)  PRN medications:   Staff Report:   No acute safety concerns. See staff notes for additional details. Awake for most of the night, hyperverbal. Staff note that patient has been very friendly and frequently engaging with other patients without awareness to body language on the unit which has caused them to snap at her and be more aggressive.     Patient Interview:   Today, Radha was seen in the [facility] playing cards but agrees to meet in her room. Reports feeling \"a little sad\" today, notes that its been difficult on the unit as she feels that people do not like her as she's been talking with them too much. Planned to stay more to herself today. Discuss what is noted I chart review, Radha is quick to become tearful when mentioning how she came to the hospital due to not wanting to concern her  further. Patient becomes hyperverbal, mentioning that she as a person who feels things very deeply, has been feeling others emotions (including those on the unit) and thinking about all the ways people hurt. Struggled outside the hospital because she would like a job that helps people, wants to be able to ease suffering for others. She does not think she is bipolar (\"I know bipolar, my brother is bipolar and he's homeless... that's not me\"), just feels thinks intensely. Does not like her outpatient provider as she's tried to explain this and thinks she mostly struggles with anxiety/depression, although does not think she truly has depression as \"how can one tell if that was true depression or just because I didn't like my job [before I quit]? 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 Writer attempts to interject with questions and validation multiple times throughout this with difficulty. Does not notice any changes with Trilleptal, would not like to consider any other medications as she is not sure she needs them. Describes that when she was psychotic during her hospitalization here before, \"everyone else kept telling me I was manic but I just didn't see it. It did take me four days to sleep but I was just active, there wasn't anything bad there.\" Does not think she has any mental health daignoses, does not presently think she needs to be on medication. When asked about her 's concerns, she notes that he does not appear to understand her. 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 Denies any obvious goals for self while in the hospital when prompted about her goals, quickly transitioning conversation to relating her and her  to characters from [employer] to explain their relationship dynamic. Discuss possibility of transferring to another unit as she may benefit from less acuity and more ability to participate in talk therapy groups or activities with more introspection as she expresses a strong desire for this, patient smiles and states she would appreciate this. Agreeable to team talking to her .     Called  x2, went to voice mail.   Physical Examination:  /82 (Patient Position: Sitting, Cuff Size: Adult Large)   Pulse 87   Temp 97  F (36.1  C) (Temporal)   Resp 16   Ht 1.651 m (5' 5\")   Wt 113.9 kg (250 lb 15.9 oz)   SpO2 97%   BMI 41.77 kg/m    Weight is 250 lbs 15.9 oz  Body mass index is 41.77 kg/m .    Appearance:  awake, alert, adequately groomed, and dressed in hospital " "scrubs  Attitude:   cooperative, engaged, and difficult to redirect  Eye Contact:  staring  Mood:  \"A little sad\"  Affect:  intensity is heightened, labile, and reactive  Speech:  increased speech latency, pressured speech, and rambling  Psychomotor Behavior:  no evidence of tardive dyskinesia, dystonia, or tics  Thought Process:  tangential and circumstantial, some intermittent disorganization  Thought Content:  no evidence of suicidal ideation or homicidal ideation and no evidence of psychotic thought; perseverative about feeling things deeply, not believing she has bipolar disorder. loosening of associations present  Perception: denies hallucinations  Insight:   poor due to lauren, lack of awareness to others perspectives  Judgment:  limited  Oriented to:  time, person, and place  Attention Span and Concentration:  limited  Recent and Remote Memory:  intact  Language:  English with appropriate syntax and vocabulary  Fund of Knowledge: appropriate  Muscle Strength and Tone: normal  Gait and Station: Normal    Liver/kidney function Metabolic CBC   Recent Labs   Lab Test 02/20/25  1436 01/03/24  1540 01/02/24  1633   CR 0.91 0.77 0.82   AST  --  27 32   ALT  --  24 28   ALKPHOS  --  59 79    Recent Labs   Lab Test 12/31/23  1031 02/23/21  0739 02/22/21  1403   CHOL  --  212*  --    TRIG 122 114  --    LDL  --  123*  --    HDL  --  66  --    TSH  --   --  1.36    Recent Labs   Lab Test 02/20/25  1436   WBC 11.5*   HGB 15.8*   HCT 45.4   MCV 92             Lab results in the last 24 hours:  No results found for this or any previous visit (from the past 24 hours).      Assessment:  Diagnoses:  # Bipolar disorder, current episode manic, with psychotic features  # Anxiety, unspecified  # R/o cannabis use disorder    Radha is a 59 year old female with a history of bipolar disorder, currently admitted on hospital day 2 for lauren and reported psychotic features in the context of stopping medications eight months ago. " "Last hospitalized in 2021 under similar circumstances requiring ECT for stabilization, reports some intermittent episodes of depression since then with one episode last year.  Predominant symptoms are mood lability with frequent tearfulness, sleep issues, hyperverbal thought, and (per chart review in ED) disorganization with some previous references to paranoia and AVH per . She demonstrates present and historical poor insight into her symptoms, largely minimizing her symptoms due to the severity of her brother's bipolar disorder. Patient does appear to have recently returned to smoking and uses marijuana 3-4 times a week to reduce stress.    Diagnosis does appear consistent with acute lauren with psychotic features in the settting of medication non-adherence. Notably is very pleasant however labile and continues to be hyperverbal. She was admitted by Dr. Early on 2/23 and appeared to be improving with sublingual lorazepam. She was also started on trilleptal after refusing lithium and declining depakote due to concern for weight gain. Will likely need further optimization of her medication regimen. Prior PTA medication until 8 months ago regimen unclear due to limitations in chart review - during her 2021 admission, she was stabilized on 35mg total of Olanzapine and did quite well on this dose. Further collateral would be beneficial from her  and provider team to better optimize care. She would also likely benefit from transfer off of station 12 due to having poor boundaries and awareness of others, resulting in some responses of aggravation from other peers. She is happy and agreeable to this.     Clinically Significant Risk Factors                              # Severe Obesity: Estimated body mass index is 41.77 kg/m  as calculated from the following:    Height as of this encounter: 1.651 m (5' 5\").    Weight as of this encounter: 113.9 kg (250 lb 15.9 oz)., PRESENT ON ADMISSION            Plan:  - " Continue Trileptal 150mg BID  - Will defer any additional medication changes at this time - Continue to call /see if he is able to provide any collateral regarding her prior psychiatric medication regimen, consider adding scheduled lorazepam vs zyprexa due to prior benefit.     Patient will be treated in therapeutic milieu with appropriate individual and group therapies as described.      Medical diagnoses to be addressed this admission:      #CAD w/ history of NSTEMI 2021 (70% stenosis)  #HTN  #HLD  -Atenalol 25mg  -Atorvastatin 50mg  -ASA 81mg        Consults:   None at this time    Psychiatric Hospital course:   Radha James was admitted to [facility] on 2/22/2025 after presenting to the ED with lauren. Her PTA medications were continued however psychiatric medications are unclear at this time.  -2/23: Started on oxcarbazepine 150mg BID    Legal Status:   Orders Placed This Encounter      Voluntary      Disposition: TBD, pending stabilization & development of a safe discharge plan.       Safety Assessment:   Behavioral Orders   Procedures    Code 1 - Restrict to Unit    Routine Programming     As clinically indicated    Status 15     Every 15 minutes.       Current Facility-Administered Medications   Medication Dose Route Frequency Provider Last Rate Last Admin    aspirin (ASA) chewable tablet 81 mg  81 mg Oral Daily Keyshawn Early MD   81 mg at 02/24/25 0955    atenolol (TENORMIN) tablet 25 mg  25 mg Oral Daily Buzz Sanders APRN CNP   25 mg at 02/24/25 0955    atorvastatin (LIPITOR) tablet 40 mg  40 mg Oral Daily Buzz Sanders APRN CNP   40 mg at 02/24/25 0955    OXcarbazepine (TRILEPTAL) tablet 150 mg  150 mg Oral BID Keyshawn Early MD   150 mg at 02/24/25 2028     Current Facility-Administered Medications   Medication Dose Route Frequency Provider Last Rate Last Admin    acetaminophen (TYLENOL) tablet 650 mg  650 mg Oral Q4H PRN Buzz Sanders APRN CNP   650 mg at 02/23/25 3914     alum & mag hydroxide-simethicone (MAALOX) suspension 30 mL  30 mL Oral Q4H PRN Buzz Sanders APRN CNP   30 mL at 02/22/25 1510    hydrOXYzine HCl (ATARAX) tablet 25 mg  25 mg Oral Q4H PRN Buzz Sanders APRN CNP        LORazepam (ATIVAN) tablet 1 mg  1 mg Oral Q4H PRN Keyshawn Early MD        melatonin tablet 3 mg  3 mg Oral At Bedtime PRN Buzz Sanders APRN CNP        senna-docusate (SENOKOT-S/PERICOLACE) 8.6-50 MG per tablet 1 tablet  1 tablet Oral BID PRN Buzz Sanders APRN CNP           Allergies   Allergen Reactions    Zofran [Ondansetron] Nausea and Vomiting     Self reported     Patient seen and staffed with my attending physician    Paris Ross MD  Psychiatry Resident, PGY2    02/24/2025

## 2025-02-25 NOTE — PLAN OF CARE
Team Note Due:  Monday     Assessment/Intervention/Current Symtoms and Care Coordination:  Chart review and met with team, discussed pt progress, symptomology, and response to treatment.  Discussed the discharge plan and any potential impediments to discharge.    Pt arrived to [facility] on Sat 2/22/25. Reason for admission is manic episode. She was exhibiting pressured speech, paranoia, delusions, racing thoughts, not feeling the need to sleep. Per chart, she has been off her meds for 8 months.     Per team, pt hid pepto bismol in a pair of underwear in the chair last night that her  brought in. She expressed remorse. Pt slept 1.75 hours last night. She was up and restless stating her mind was racing. Discuss pt's  ability to visit or not. Provider was going to call  to speak with him.     I attempted to meet with pt but she was in group. She has been hyper verbal, but pleasant.   I attempted to meet with pt another time and she was on the phone, smiling, laughing with someone on the phone.     The plan is to transfer patient to a less acute unit.      Discharge Plan or Goal:  TBD     Barriers to Discharge:  Manic symptomology      Referral Status:  TBD, Consider [facility] 55[facility] IOP     Legal Status:  Pt is voluntary.     Contacts (include ANIVAL status):  Primary Care Provider:  Name/Clinic: Paris Vaca MD/[facility]  Number: [phone]     Medication Management/Psychiatry:  Name/Clinic: Dr. Nay Torres MD/[facility]    Number: [phone]     Therapist:   Name/Clinic: Ginette Mclaughlin [facility][note 3]  Number: [phone]     Other contact information (family, friends, SO) and ANIVAL status:   Pt signed an ANIVAL for her  Jose.     Upcoming Meetings and Dates/Important Information and next steps:  Coordinate care

## 2025-02-25 NOTE — CARE PLAN
Rehab Group    Start time: 1115  End time: 1200  Patient time total: 15 minutes    attended partial group     #3 attended   Group Type: OT Clinic   Group Topic Covered: activity therapy and coping skills     Group Session Detail:  Pt attended OT clinic group, was able to initiate task and ask for help as needed. Pt demonstrated good planning, task focus, and problem solving. Appeared comfortable interacting with peers.     Patient Response/Contribution:  positive affect, cooperative     Patient Detail:    Social while continuing to work on small aj [employer] project.  Wanted writer to see 3 cards and picture  brought last night to help improve her mood, discussed what a positive support he has been though the years.  Able to focus on self during this group, did not bring up wanting to further help peer with housing resources and ideas.  Pt shared she was able to get more rest last night - realizes it still is not optimal, though is seeing improvements.      [id number] OT Group (2 or more in attendance)      Patient Active Problem List   Diagnosis    Unusual change in behavior    Psychosis, unspecified psychosis type (H)    Intractable vomiting    Phuong (H)    Bipolar I disorder, most recent episode (or current) manic (H)

## 2025-02-25 NOTE — PLAN OF CARE
"Problem: Manic Symptoms  Goal: Manic Symptoms  Description: Signs and symptoms of listed problems will be absent or manageable.  Outcome: Not Progressing  Flowsheets (Taken 2/25/2025 0052)  Manic Symptoms Present:   anxiety   speech, hyperverbal   sleep, insomnia     Goal Outcome Evaluation:    Patient sitting in the lounge at the start of the shift. Patient appeared anxious and hyperverbal. Patient talking about how she didn't mean to break any rules and that her  \"was only trying to help me.\" Patient was stating that she had some of her belongings with her in the lounge and had hid \"something in the chair.\" The patient was compliant in removing the item she had pushed down into the cushions of the chair. She had rolled up a clean pair of underwear and had hidden four (4) tabs of chewable Pepto Bismol still in the original packaging. Patient denied having anything else hidden in the chair or her room. Patient stated her  had brought her six (6) tabs and she had taken two (2) tabs during his visit today. Patient stated, \"he was just trying to help me.\" Patient stated she intestinal issues at times depending on what she eats. The patient denies any pain, N/V/D currently. Patient's medications reviewed with her and she was informed that there is an order for Maalox should she experience her symptoms in the future during her admission. Patient expressed acceptance and stated, \"oh Maalox is much better.\" The four tabs of Pepto Bismol were placed in the patient's locker. Patient reminded, and she expressed understanding, that she can only take medications prescribed by the provider and provided to her by the staff.     At 2337 the patient stated how she was unable to sleep \"even though it's late.\" Patient given melatonin and a extra blanket as she states she was chilled in her room. At 0030 patient remained awake and upon performing rounds she stated she \"thought of something and had to read a note she had " "again.\" Patient stated she was anxious about the meaning behind the note she received and was having difficulty not ruminating and falling asleep. Patient offered Ativan and accepted. Patient presented to the nurses desk requesting a heat pack for her lower back. Patient states she feels bad to complain about the bed because \"they got me an eggcrate which helps a little bit and I don't want to seem ungrateful.\" Patient accepted an offer for Tylenol for her pain of 8/10 and was given a heat pack. At 0150 patient presented to the desk stating, \"I am 95% there. I am tired, I want to sleep, my mind isn't racing but I can't get comfortable.\" Patient states she is continuing to have back pain and stated, \"can I have a wedge like Parth? He is the one who told me about the eggcrate.\" Wedge placed onto patient's bed at the head of the bed per her request. Patient stated it would also help with her snoring. She denies using a CPap currently stating, \"I never got used to it.\" At 0350 patient presented to the lounge requesting to sit with staff and talk. When staff informed her of the time and need to attempt to sleep and practice sleep hygiene she requested to sit and sleep in a chair due to her back. Patient informed this writer would move a chair into her room so she can sleep sitting up to benefit her back. Patient was accepting and grateful. Patient slept for 1.75 hours of the over night shift with no s/s of acute distress.    Patient experienced no safety events or escalations during the shift, no concerns reported or observed. Safety checks completed at least every 15 minutes. Patient required PRN melatonin 3 mg, Ativan 1 mg, Tylenol 650 mg see MAR. Nursing will continue to monitor.    "

## 2025-02-25 NOTE — PLAN OF CARE
Problem: Adult Inpatient Plan of Care  Goal: Optimal Comfort and Wellbeing  Outcome: Progressing   Goal Outcome Evaluation:         Radha had a good evening tonight. She had a visit from her  and they played cards with another peer. She has not had any behavioral problems tonight. She remained calm and cooperative. She reports that she is feeling well and feels that she is making progress towards discharge. Speech is less pressured. Able to maintain appropriate boundaries with staff and peers. No other concerns noted. Denies any acute physical health issues.

## 2025-02-26 ENCOUNTER — TELEPHONE (OUTPATIENT)
Dept: BEHAVIORAL HEALTH | Facility: CLINIC | Age: 60
End: 2025-02-26
Payer: COMMERCIAL

## 2025-02-26 LAB
EST. AVERAGE GLUCOSE BLD GHB EST-MCNC: 120 MG/DL
HBA1C MFR BLD: 5.8 %

## 2025-02-26 PROCEDURE — 124N000002 HC R&B MH UMMC

## 2025-02-26 PROCEDURE — 250N000013 HC RX MED GY IP 250 OP 250 PS 637: Performed by: ANESTHESIOLOGY

## 2025-02-26 PROCEDURE — 250N000013 HC RX MED GY IP 250 OP 250 PS 637: Performed by: STUDENT IN AN ORGANIZED HEALTH CARE EDUCATION/TRAINING PROGRAM

## 2025-02-26 PROCEDURE — H2032 ACTIVITY THERAPY, PER 15 MIN: HCPCS

## 2025-02-26 PROCEDURE — 250N000013 HC RX MED GY IP 250 OP 250 PS 637

## 2025-02-26 PROCEDURE — 99232 SBSQ HOSP IP/OBS MODERATE 35: CPT | Mod: GC | Performed by: PSYCHIATRY & NEUROLOGY

## 2025-02-26 PROCEDURE — 250N000013 HC RX MED GY IP 250 OP 250 PS 637: Performed by: REGISTERED NURSE

## 2025-02-26 RX ORDER — DIPHENHYDRAMINE HYDROCHLORIDE 50 MG/ML
50 INJECTION INTRAMUSCULAR; INTRAVENOUS EVERY 8 HOURS PRN
Status: ACTIVE | OUTPATIENT
Start: 2025-02-26

## 2025-02-26 RX ORDER — HALOPERIDOL 5 MG/ML
5 INJECTION INTRAMUSCULAR EVERY 8 HOURS PRN
Status: ACTIVE | OUTPATIENT
Start: 2025-02-26

## 2025-02-26 RX ORDER — LORAZEPAM 2 MG/1
2 TABLET ORAL EVERY 8 HOURS PRN
Status: DISPENSED | OUTPATIENT
Start: 2025-02-26

## 2025-02-26 RX ORDER — LORAZEPAM 2 MG/ML
2 INJECTION INTRAMUSCULAR EVERY 8 HOURS PRN
Status: ACTIVE | OUTPATIENT
Start: 2025-02-26

## 2025-02-26 RX ORDER — DIPHENHYDRAMINE HCL 50 MG
50 CAPSULE ORAL EVERY 8 HOURS PRN
Status: ACTIVE | OUTPATIENT
Start: 2025-02-26

## 2025-02-26 RX ORDER — HALOPERIDOL 5 MG/1
5 TABLET ORAL EVERY 8 HOURS PRN
Status: ACTIVE | OUTPATIENT
Start: 2025-02-26

## 2025-02-26 RX ADMIN — Medication 3 MG: at 20:07

## 2025-02-26 RX ADMIN — ACETAMINOPHEN 975 MG: 325 TABLET, FILM COATED ORAL at 18:41

## 2025-02-26 RX ADMIN — ATORVASTATIN CALCIUM 40 MG: 10 TABLET, FILM COATED ORAL at 07:54

## 2025-02-26 RX ADMIN — LORAZEPAM 2 MG: 2 TABLET ORAL at 10:40

## 2025-02-26 RX ADMIN — OXCARBAZEPINE 150 MG: 150 TABLET, FILM COATED ORAL at 07:54

## 2025-02-26 RX ADMIN — OXCARBAZEPINE 150 MG: 150 TABLET, FILM COATED ORAL at 20:06

## 2025-02-26 RX ADMIN — ASPIRIN 81 MG CHEWABLE TABLET 81 MG: 81 TABLET CHEWABLE at 07:54

## 2025-02-26 RX ADMIN — ATENOLOL 25 MG: 25 TABLET ORAL at 07:54

## 2025-02-26 ASSESSMENT — ACTIVITIES OF DAILY LIVING (ADL)
LAUNDRY: UNABLE TO COMPLETE
ADLS_ACUITY_SCORE: 38
DRESS: SCRUBS (BEHAVIORAL HEALTH)
ORAL_HYGIENE: INDEPENDENT
ADLS_ACUITY_SCORE: 38
ADLS_ACUITY_SCORE: 38
DRESS: SCRUBS (BEHAVIORAL HEALTH)
ADLS_ACUITY_SCORE: 38
ADLS_ACUITY_SCORE: 38
ORAL_HYGIENE: INDEPENDENT
LAUNDRY: UNABLE TO COMPLETE
ADLS_ACUITY_SCORE: 38
HYGIENE/GROOMING: HANDWASHING;INDEPENDENT
ADLS_ACUITY_SCORE: 38
HYGIENE/GROOMING: HANDWASHING;INDEPENDENT
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
## 2025-02-26 NOTE — TELEPHONE ENCOUNTER
R: [place]  Access Inpatient Adult Bed Call Log  2/26/25 @ 1:00am   Intake has called facilities that have not updated their bed status within the last 12 hours.     *[facility] Only- Transfer:  [facility]: @ capacity.      Pt remains on waitlist pending appropriate placement availability.

## 2025-02-26 NOTE — DISCHARGE INSTRUCTIONS
Behavioral Discharge Planning and Instructions    Summary: You were admitted on 2/22/2025 due to Manic Symptomology. You were treated by Dianelys Sears MD and discharged on *** from [facility] to {[facility] D/C Locations:[id number]}    Main Diagnosis:   # Bipolar disorder, current episode manic, with psychotic features  # Anxiety, unspecified  # R/o cannabis use disorder    Commitment:  None. Legal Status: Voluntary     Health Care Follow-up:     PSYCHIATRY  Name/Clinic: Dr. Nay Torres MD/[facility] [place]    Number: [phone]  Appt:     THERAPY:  Name/Clinic: Ginette Mclaughlin [facility] [place]  Number: [phone]  Appt:       Patient Navigation Hub:   [facility] work to be your point-of-contact for trustworthy and compassionate care from Inpatient services to [facility] Programmatic Care. We will provide resources and communication to help guide you into programmatic care. Ultimately, our goal is to be the one-stop-shop of communication, coordination, and support for your journey to programmatic care.    Phone: [phone]    Information will be faxed to your outpatient providers to ensure a healthy continuity of care for you.     Attend all scheduled appointments with your outpatient providers. Call at least 24 hours in advance if you need to reschedule an appointment to ensure continued access to your outpatient providers.     Major Treatments, Procedures and Findings:  You were provided with: a psychiatric assessment, assessed for medical stability, medication evaluation and/or management, group therapy, individual therapy, milieu management, and medical interventions    Symptoms to Report: feeling more aggressive, increased confusion, losing more sleep, mood getting worse, thoughts of suicide, or paranoia, racing thoughts, rapid speech, lots of energy, excessive spending.     Early warning signs can include: increased depression or anxiety sleep disturbances  [facility] ([facility])  Plastic Surgery  Operative Note    SUMMARY     Date of Procedure: 9/20/2024     Location:  [facility]    Procedure(s): Procedure(s) (LRB):  MASTOPEXY (Right)  LIPOSUCTION, WITH FAT TRANSFER (N/A)     Major revision of left breast reconstruction with skin revision   Right mastopexy for symmetry   Fat grafting to the left reconstructed breast, 60 cc  Fat grafting to the right breast, 50 cc  Kenalog injection into hypertrophic abdominal and left axillary scar    Surgeons and Role:     * Mehran Street, DO - Primary    Assistant: OSVALDO Graham and Patrice Martin MD, Fellow    Pre-Operative Diagnosis: Carcinoma of upper-inner quadrant of left breast in female, estrogen receptor negative [C50.212, Z17.1]    Post-Operative Diagnosis: same    Anesthesia: General    Indications for Procedure:  This is a 37-year-old woman with a history of left breast cancer.  She underwent tissue expander placement and delayed LETY flap reconstruction.  She now presents for her 2nd stage.  Her areas of concern included asymmetry with her native right breast, upper pole volume deficiency, and thick scar at her abdominal incision and her left axilla    Operative Findings (including complications, if any):   1500 mL total tumescent solution used   850 cc total lipoaspirate   300 cc of fat collected in the revolve   60 cc of autologous fat injected in the left breasts   50 cc of fat injected into the right breast  Right-sided circum vertical mastopexy with auto augmentation   Kenalog 40 injected into abdominal and left axillary incisions, 1 cc total    Description of Procedures:  Patient was seen in the preoperative holding area.  All questions were answered.  Consents were signed at her preoperative visit.  The right breast was marked for a circum vertical mastopexy.  I measured checked the areolar height of her left breast which it was higher after having the tissue  expander in place.  I marked areas of volume deficiency in the upper pole of the right breast.  She also had a skin excess laterally on the left side which I marked for excision.  Areas of lipodystrophy of the abdomen were marked.      She was taken the operating room general anesthesia was induced.  Both breasts and the abdomen were prepped and draped in a sterile fashion.  Through 2 small stab incisions along her previous abdominal incision and a small stab incision laterally on each chest a total of 1500 cc of tumescent solution was instilled.      Next the mastopexy was performed.  I measured checked marks in the anticipated height for the top of the areola.  The right IMF it was also little bit lower though I expected this to raise somewhat with the mastopexy.  I measured the size of her left areola and a 38 mm cookie cutter was felt to be appropriate.  A 38 mm cookie cutter was used around the areola.  I then de-epithelialized a superior pedicle in the central portion of the breasts.  Next using a knife the circum vertical pattern was excised.  I scored the dermis along the pedicle.  The inferior tissue was dissected straight down to leave an inferior wedge all the way to the chest wall.  The skin was removed from the dissected area below the superior pedicle.  Next we undermined the superior pedicle and the central portion up to the breast up past the top of the areola.  It was then able to invert the distal part of the superior pedicle underneath the areola for an auto augmentation.  She had a significantly projected contralateral breast.  The breast was irrigated checked for hemostasis.  2-0 Vicryl sutures were used for pillar stitches in the lower pole.  The breast was temporarily stapled closed.      Next liposuction was performed.  Using 4 mm Mercedes cannula and power assisted liposuction, liposuction was performed to the chest wall lateral to the breasts on both sides, the bilateral flanks and central  increased thoughts or behaviors of suicide or self-harm  increased unusual thinking, such as paranoia or hearing voices    Safety and Wellness:  Take all medicines as directed.  Make no changes unless your doctor suggests them.      Follow treatment recommendations.  Refrain from alcohol and non-prescribed drugs.  Ask your support system to help you reduce your access to items that could harm yourself or others. If there is a concern for safety, call 911.    Resources:   Mental Health Crisis Resources  Throughout [place]: call **CRISIS (**953099)  Crisis Text Line: is available for free, 24/7 by texting [place] to 308496  Suicide Awareness Voices of Education (SAVE) (www.save.org): 759-941-MSAA (0531)  The National Suicide Prevention Lifeline is now: 988 Suicide and Crisis Lifeline. Call 988 anytime.  National [place] on Mental Illness (www.[place].negin.org): 533.608.3869 or 410-571-6560.  Wnnr4wztc: text the word LIFE to 49221 for immediate support and crisis intervention  Mental Health Consumer/Survivor Network of [place] (www.mhcsn.net): 548.458.1988 or 321-148-3114  Mental Health Association of [place] (www.mentalhealth.org): 163.116.7118 or 796-739-3515  Peer Support Connection [place] Warmline ([facility]) 1-109.899.5661 Available from 5pm - 9am (7 days a week/365 days a year)  Call or Text 988 or [facility] 1-182.741.4969    General Medication Instructions:   See your medication sheet(s) for instructions.   Take all medicines as directed.  Make no changes unless your doctor suggests them.   Go to all your doctor visits.  Be sure to have all your required lab tests. This way, your medicines can be refilled on time.  Do not use any drugs not prescribed by your doctor.  Avoid alcohol.    Advance Directives:   Scanned document on file with [facility]? No scanned doc  Is document scanned? Pt states no documents  Honoring Choices Your Rights Handout: Informed and given  Was more information offered? Materials given    The Treatment team has  abdomen.  A total of 850 cc total lipoaspirate was collected.  300 cc of fat was collected revolve.  The fat was then washed with 1 L of warm lactated Ringer's solution and 3 aliquots and drawn into 10 cc syringes.      After liposuction the bed was flexed patient was sat up.  We are able to compare the right and left breasts.  It was much better symmetry.  She had some excess tissue laterally on the left side.  Tailor tack technique was used to imbricate the excess skin and provided better contour to the lateral border of the breast.  The area was then marked with a marking pen.  Patient was made laid back flat.  Using a knife the lateral border of breast skin extending onto the chest wall was removed and a large swath.  It was roughly 6 cm in length.  No undermining had to be done in the lateral portion of the connected to the area of liposuction.  It was then temporarily stapled closed.    Fat grafting was done to each breast.  Using a 2.1 mm spoon tip cannula 60 cc of fat was injected into the upper pole and the transition point from the lateral [place] to the breast on the left side, on the right side 50 cc of fat was injected into the upper pole and medially for added symmetry.      Both breasts were then closed.    Five 0 nylon was used to close the liposuction sites   The mastopexy skin was closed with buried 3-0 Monocryl.  A few 3-0 Monocryl were used to hold the areola in place.  Finally the areola was inset with half buried horizontal 4-0 Prolene mattress sutures.  4-0 Stratafix was used to run the subcuticular layer of the right breast.  On the left side the area of excess skin was closed with a buried 3-0 Monocryl and running 3-0 Stratafix.    Prineo tape was used for the vertical portion of the right breast in the skin excision on the left side.  Steri-Strips were placed around the areola.  The patient was padded and a postoperative bra and a girdle.  She tolerated the procedure well taken to recovery in  appreciated the opportunity to work with you. If you have any questions or concerns about your recent admission, you can contact the unit which can receive your call 24 hours a day, 7 days a week. They will be able to get in touch with a Provider if needed. The unit number is [phone] .   stable condition    Significant Surgical Tasks Conducted by the Assistant(s), if Applicable: The indicated resident served as first assistant for this procedure.    Estimated Blood Loss (EBL): 50mL           Implants: * No implants in log *    Specimens:   Specimen (24h ago, onward)      None                    Condition: Good    Disposition: PACU - hemodynamically stable., DC home.  Follow up one-week    Attestation: I was present and scrubbed for the entire procedure.
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
## 2025-02-26 NOTE — PLAN OF CARE
"Patient was observed out in the lounge area interacting and laughing with peers and socializing. [facility] then she went to her room and was observed crying. When writer went to her room to notify her that her  was coming to visit. She came out of her room and was stating \" I am okay , I am just feeling sad but I do wanted to see my \". She was reminded that [facility].     Patient is in denial about her mental illness and was stating \" I have a problem toward the word bipolar, and there is stigma toward the word\". She appears to be labile and animated at times. Patient rated her pain 10/10 on her back, and she was given Tylenol 975 mg at 22:34 PM. She denies having any thoughts of wanting to harm herself or others, depression, anxiety, and visual hallucinations/auditory hallucinations. Per staff report she was requesting for bariatric bed, and reports that she doesn't like sleeping on the bed.       Blood pressure 109/65, pulse 80, temperature 98.3  F (36.8  C), temperature source Oral, resp. rate 16, height 1.651 m (5' 5\"), weight 113.9 kg (250 lb 15.9 oz), SpO2 97%.    "

## 2025-02-26 NOTE — PLAN OF CARE
BEH IP Unit Acuity Rating Score (UARS)  Patient is given one point for every criteria they meet.    CRITERIA SCORING   On a 72 hour hold, court hold, committed, stay of commitment, or revocation. 0    Patient LOS on BEH unit exceeds 20 days. 0  LOS: 4   Patient under guardianship, 55+, otherwise medically complex, or under age 11. 0   Suicide ideation without relief of precipitating factors. 0   Current plan for suicide. 0   Current plan for homicide. 0   Imminent risk or actual attempt to seriously harm another without relief of factors precipitating the attempt. 0   Severe dysfunction in daily living (ex: complete neglect for self care, extreme disruption in vegetative function, extreme deterioration in social interactions). 1   Recent (last 7 days) or current physical aggression in the ED or on unit. 0   Restraints or seclusion episode in past 72 hours. 0   Recent (last 7 days) or current verbal aggression, agitation, yelling, etc., while in the ED or unit. 1   Active psychosis. 1   Need for constant or near constant redirection (from leaving, from others, etc).  1   Intrusive or disruptive behaviors. 1   Patient requires 3 or more hours of individualized nursing care per 8-hour shift (i.e. for ADLs, meds, therapeutic interventions). 0   TOTAL 5

## 2025-02-26 NOTE — PROGRESS NOTES
Patient  was very disorganized,isolative and paranoid.Patient had poor boundaries towards other patient. Patient  escalated this morning by screaming and   bagging her head in the vu by the telephone .Patient was redirectable and went to her room.This writer offered oral anxiety PRN medication and patient refused to take it.JAMES team was called for extra support and patient refused again to take the medication.Patient stated that the medications are poisons and she will never take any medications from anyone again. Patient de-escalated her self  and stated that she wants to be by herself in the room..Provider was notified . Patient also requested her primary psychologist number(JOSSE). This writer called the  and was able to get the number and was provided  it to the patient. Patient also  stated that she wants to make sure a provider called CHANDAN FRAGA is not on her contacts list,she doesn't want anyone to give her informations to that person. This writer checked and updated the patient.Charge nurse was notified.Patient is currently in her room coloring . Patient denied SI,HI,and HALLUCINATIONS.Will continue to monitor.
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
## 2025-02-26 NOTE — PLAN OF CARE
"Radha endorsed heartburn and received  Maalox  at 2351 which was helpful  She was awake most of the night  disorganized, responding to internal stimuli and  frequently seeking out staff for one thing or the other.   noted to have slept  for approximately 2 hours without significant discomfort  No pain or discomfort noted or verbalized tonight.      Fell on her left knee at 0614. Staff (KRYSTA) reported he heard a sound in the pt's room and checked on her then found her on her knee.  RN assessed the pt and no injury noted. Radha verbalized that she was sitting on the vent by her window when she fell on her left knee.  Pt appeared disorganized and the floor was noted wet. Upon enquiring, Radha verbalized that she spilled a cup of ice water on the floor. RN cleaned the water off the floor and informed Radha to sit on her bed or chair instead of the vent- Radha verbalized understanding and sat on the chair in her room. Radha denies any discomfort or hitting her head. Vitals completed as follows  BP (!) 151/95 (BP Location: Right arm, Patient Position: Sitting, Cuff Size: Adult Large)  Pulse 105  Temp 97.6  F (36.4  C) (Temporal)  Resp 19  Ht 1.651 m (5' 5\")  Wt 113.9 kg (250 lb 15.9 oz)  SpO2 93%  BMI 41.77 kg/m .   Provider ([facility]) called at 0633 and RN spoke with coordinator Carly who stated that a doctor will call back.  Provider called back at 0731 and was updated.  Spouse called and the phone went to voicemail. Message left with a phone number to call the hospital back. Oncoming shift updated in report to follow up.  called back at 0734 and was updated also.       Problem: Sleep Disturbance  Goal: Adequate Sleep/Rest  Outcome: Not Progressing   Goal Outcome Evaluation:                        "

## 2025-02-27 ENCOUNTER — TELEPHONE (OUTPATIENT)
Dept: BEHAVIORAL HEALTH | Facility: CLINIC | Age: 60
End: 2025-02-27
Payer: COMMERCIAL

## 2025-02-27 VITALS
OXYGEN SATURATION: 98 % | TEMPERATURE: 97.2 F | SYSTOLIC BLOOD PRESSURE: 121 MMHG | DIASTOLIC BLOOD PRESSURE: 87 MMHG | BODY MASS INDEX: 41.82 KG/M2 | WEIGHT: 250.99 LBS | RESPIRATION RATE: 18 BRPM | HEART RATE: 72 BPM | HEIGHT: 65 IN

## 2025-02-27 PROCEDURE — 250N000013 HC RX MED GY IP 250 OP 250 PS 637: Performed by: NURSE PRACTITIONER

## 2025-02-27 PROCEDURE — 250N000013 HC RX MED GY IP 250 OP 250 PS 637: Performed by: REGISTERED NURSE

## 2025-02-27 PROCEDURE — 97150 GROUP THERAPEUTIC PROCEDURES: CPT | Mod: GO

## 2025-02-27 PROCEDURE — 124N000002 HC R&B MH UMMC

## 2025-02-27 PROCEDURE — H2032 ACTIVITY THERAPY, PER 15 MIN: HCPCS

## 2025-02-27 PROCEDURE — 250N000013 HC RX MED GY IP 250 OP 250 PS 637: Performed by: ANESTHESIOLOGY

## 2025-02-27 PROCEDURE — 250N000013 HC RX MED GY IP 250 OP 250 PS 637: Performed by: STUDENT IN AN ORGANIZED HEALTH CARE EDUCATION/TRAINING PROGRAM

## 2025-02-27 PROCEDURE — 250N000013 HC RX MED GY IP 250 OP 250 PS 637

## 2025-02-27 PROCEDURE — 99232 SBSQ HOSP IP/OBS MODERATE 35: CPT | Mod: GC | Performed by: PSYCHIATRY & NEUROLOGY

## 2025-02-27 RX ORDER — IBUPROFEN 600 MG/1
600 TABLET, FILM COATED ORAL ONCE
Status: COMPLETED | OUTPATIENT
Start: 2025-02-27 | End: 2025-02-27

## 2025-02-27 RX ORDER — OXCARBAZEPINE 150 MG/1
300 TABLET, FILM COATED ORAL 2 TIMES DAILY
Status: DISPENSED | OUTPATIENT
Start: 2025-02-27

## 2025-02-27 RX ORDER — HALOPERIDOL 5 MG/1
5 TABLET ORAL DAILY
Status: ACTIVE | OUTPATIENT
Start: 2025-02-28

## 2025-02-27 RX ORDER — HALOPERIDOL 10 MG/1
10 TABLET ORAL AT BEDTIME
Status: DISPENSED | OUTPATIENT
Start: 2025-02-27

## 2025-02-27 RX ADMIN — HYDROXYZINE HYDROCHLORIDE 25 MG: 25 TABLET, FILM COATED ORAL at 04:26

## 2025-02-27 RX ADMIN — OXCARBAZEPINE 150 MG: 150 TABLET, FILM COATED ORAL at 08:41

## 2025-02-27 RX ADMIN — ALUMINUM HYDROXIDE, MAGNESIUM HYDROXIDE, AND SIMETHICONE 30 ML: 200; 200; 20 SUSPENSION ORAL at 01:57

## 2025-02-27 RX ADMIN — ALUMINUM HYDROXIDE, MAGNESIUM HYDROXIDE, AND SIMETHICONE 30 ML: 200; 200; 20 SUSPENSION ORAL at 18:46

## 2025-02-27 RX ADMIN — ACETAMINOPHEN 975 MG: 325 TABLET, FILM COATED ORAL at 19:58

## 2025-02-27 RX ADMIN — OXCARBAZEPINE 300 MG: 150 TABLET, FILM COATED ORAL at 20:20

## 2025-02-27 RX ADMIN — ACETAMINOPHEN 975 MG: 325 TABLET, FILM COATED ORAL at 04:26

## 2025-02-27 RX ADMIN — ATORVASTATIN CALCIUM 40 MG: 10 TABLET, FILM COATED ORAL at 08:41

## 2025-02-27 RX ADMIN — ATENOLOL 25 MG: 25 TABLET ORAL at 08:41

## 2025-02-27 RX ADMIN — Medication 3 MG: at 20:20

## 2025-02-27 RX ADMIN — ACETAMINOPHEN 975 MG: 325 TABLET, FILM COATED ORAL at 08:40

## 2025-02-27 RX ADMIN — IBUPROFEN 600 MG: 600 TABLET, FILM COATED ORAL at 21:55

## 2025-02-27 RX ADMIN — ASPIRIN 81 MG CHEWABLE TABLET 81 MG: 81 TABLET CHEWABLE at 08:40

## 2025-02-27 RX ADMIN — HALOPERIDOL 10 MG: 10 TABLET ORAL at 20:19

## 2025-02-27 ASSESSMENT — ACTIVITIES OF DAILY LIVING (ADL)
ADLS_ACUITY_SCORE: 38
HYGIENE/GROOMING: INDEPENDENT
ORAL_HYGIENE: INDEPENDENT
ADLS_ACUITY_SCORE: 38
ORAL_HYGIENE: INDEPENDENT
ADLS_ACUITY_SCORE: 38
DRESS: INDEPENDENT
ADLS_ACUITY_SCORE: 38
HYGIENE/GROOMING: INDEPENDENT;SHOWER
ADLS_ACUITY_SCORE: 38
LAUNDRY: WITH SUPERVISION
ADLS_ACUITY_SCORE: 38
ADLS_ACUITY_SCORE: 38
DRESS: INDEPENDENT

## 2025-02-27 NOTE — PLAN OF CARE
Team Note Due:  Monday     Assessment/Intervention/Current Symtoms and Care Coordination:  Chart review and met with team, discussed pt progress, symptomology, and response to treatment.  Discussed the discharge plan and any potential impediments to discharge.    Per team, pt appeared to be less agitated and tense in the evening, socializing in the milieu, attending group but was late to group and exhibiting hyper verbal, pressured speech. She slept for 1 hour last night. There was a discussion had that we may need to petition for commitment if patient is not agreeing to take medications. Pt appears impulsive and exhibiting intermittent agitation.      I prepped petition paperwork just in case and sent Dr Ross the appropriate documents.     I briefly saw patient, she exhibited some paranoia but overall was in a bright mood, pressured speech, laughing/smiling. She was sitting amongst peers talking to them but they did not appear to be responding back to her. Pt appears suspicious of others, checking badges.     Discharge Plan or Goal:  TBD  Home with supports/IOP vs IRTS?    Barriers to Discharge:  Manic symptomology      Referral Status:  TBD, Consider [facility] 55+ IOP     Legal Status:  Pt is voluntary.     Contacts (include ANIVAL status):  Primary Care Provider:  Name/Clinic: Paris Vaca MD/[facility]  Number: [phone]     Medication Management/Psychiatry:  Name/Clinic: Dr. Nay Torres MD/[facility] [place]    Number: [phone]     Therapist:   [facility] [place]  Number: [phone]     Other contact information (family, friends, SO) and ANIVAL status:   Pt signed an ANIVAL for her  Jose.     Upcoming Meetings and Dates/Important Information and next steps:  Coordinate care

## 2025-02-27 NOTE — PLAN OF CARE
BEH IP Unit Acuity Rating Score (UARS)  Patient is given one point for every criteria they meet.    CRITERIA SCORING   On a 72 hour hold, court hold, committed, stay of commitment, or revocation. 0    Patient LOS on BEH unit exceeds 20 days. 0  LOS: 5   Patient under guardianship, 55+, otherwise medically complex, or under age 11. 0   Suicide ideation without relief of precipitating factors. 0   Current plan for suicide. 0   Current plan for homicide. 0   Imminent risk or actual attempt to seriously harm another without relief of factors precipitating the attempt. 0   Severe dysfunction in daily living (ex: complete neglect for self care, extreme disruption in vegetative function, extreme deterioration in social interactions). 1   Recent (last 7 days) or current physical aggression in the ED or on unit. 0   Restraints or seclusion episode in past 72 hours. 0   Recent (last 7 days) or current verbal aggression, agitation, yelling, etc., while in the ED or unit. 1   Active psychosis. 1   Need for constant or near constant redirection (from leaving, from others, etc).  1   Intrusive or disruptive behaviors. 1   Patient requires 3 or more hours of individualized nursing care per 8-hour shift (i.e. for ADLs, meds, therapeutic interventions). 0   TOTAL 5

## 2025-02-27 NOTE — PROGRESS NOTES
Rehab Group    Start time: 1410  End time: 1450  Patient time total: 40 minutes    attended full group    #4 attended   Group Type: general health and coping and life skills   Group Topic Covered: coping skills, emotional regulation, and mindfulness       Group Session Detail:  10 minutes to recognize the good     Patient Response/Contribution:  cooperative with task, actively engaged, and needed verbal cues to maintain appropriate verbal and physical boundaries.       Patient Detail:    Pt independently attended topic group today and was cooperative.  Pt demonstrated a good understanding of the topic covered and fair insight into their own specific issues related to topic.  Pt needed verbal cue to maintain both verbal and physical boundaries with peers.  Pt was redirectable.  Pt would start off on topic but would become tangential.  Pt will continue to be encouraged to attend groups for ongoing assessment and to work toward goals identified on plan of care.         [id number] OT Group (2 or more in attendance)      Patient Active Problem List   Diagnosis    Unusual change in behavior    Psychosis, unspecified psychosis type (H)    Intractable vomiting    Phuong (H)    Bipolar I disorder, most recent episode (or current) manic (H)

## 2025-02-27 NOTE — TELEPHONE ENCOUNTER
R: [place]  Access Inpatient Bed Call Log  2/26/2025 11:37 PM  Intake has called facilities that have not updated their bed status within the last 12 hours.    Adults:    *[facility] Only- transfer:  [place] -- [facility]: @ [facility].  [facility]-[phone]: @ [facility]. Reporting no reviews overnight.   [facility]- [id number]: @ [facility]. Low acuity   [place] -- [facility]- [phone]: @ [facility]. Low acuity only -11:46 PM Per June they can review for 1 possible bed.   [place] -- [facility]- [id number]: @ [facility].   [facility]- [phone]: @ [facility].   [facility]- [phone]: @ [facility]. Ages 18-35, Voluntary only, NO aggression/physical/sexual assault, violence hx or drug abuse, or psychosis. Negative Covid -11:39 PM Per Patsy, YA: 2, Adol: 5, Child: 0.   [facility]- [phone]: @ [facility].  [facility]- [id number]: @ [facility].  [place] -- [facility]- [phone]: @ [facility]. Do not review overnight.     Pt remains on waitlist pending appropriate placement availability.

## 2025-02-27 NOTE — PLAN OF CARE
"  Problem: Adult Inpatient Plan of Care  Goal: Plan of Care Review  Description: The Plan of Care Review/Shift note should be completed every shift.  The Outcome Evaluation is a brief statement about your assessment that the patient is improving, declining, or no change.  This information will be displayed automatically on your shift  note.  Outcome: Progressing  Flowsheets (Taken 2/26/2025 1857)  Plan of Care Reviewed With: patient  Goal: Patient-Specific Goal (Individualized)  Description: You can add care plan individualizations to a care plan. Examples of Individualization might be:  \"Parent requests to be called daily at 9am for status\", \"I have a hard time hearing out of my right ear\", or \"Do not touch me to wake me up as it startles  me\".  Outcome: Progressing  Goal: Readiness for Transition of Care  Outcome: Progressing     Problem: Adult Behavioral Health Plan of Care  Goal: Adheres to Safety Considerations for Self and Others  Outcome: Progressing  Intervention: Develop and Maintain Individualized Safety Plan  Recent Flowsheet Documentation  Taken 2/26/2025 1841 by Whitley Bergeron, RN  Safety Measures:   suicide assessment completed   safety rounds completed  Taken 2/26/2025 1100 by Whitley Bergeron, RN  Safety Measures:   safety plan reviewed   safety rounds completed     Problem: Manic Symptoms  Goal: Social and Therapeutic (Manic Symptoms)  Description: Signs and symptoms of listed problems will be absent or manageable.  Outcome: Progressing     Problem: Depressive Symptoms  Goal: Depressive Symptoms  Description: Signs and symptoms of listed problems will be absent or manageable.  Outcome: Progressing     Problem: Sleep Disturbance  Goal: Adequate Sleep/Rest  Outcome: Progressing   Goal Outcome Evaluation:    Plan of Care Reviewed With: patient Plan of Care Reviewed With: patient      Patient is calm and cooperative.Patient interacted with her peers and staffs in the milieu.Patient reported lower " back pain  10/10,Tylenol PRN was given. Patient denied SI,HI, and Hallucinations.Patient ate 100% of her dinner without any issues.Patient stated that she is expected her  to visit [facility].Patient is currently in her room.Will continue to monitor.

## 2025-02-27 NOTE — TELEPHONE ENCOUNTER
R: Bed search ([facility] Transfer) @ 4:36PM:    [facility]: @ capacity    Pt remains on the work list pending appropriate bed availability.

## 2025-02-27 NOTE — TELEPHONE ENCOUNTER
R: pt is on unit needing transfer to another psych unit. All other psych units are currently at [facility].       Pt remains on the work list pending appropriate bed availability.

## 2025-02-27 NOTE — PROGRESS NOTES
Rehab Group    Start time: 1615  End time: 1700  Patient time total: 30 minutes    attended partial group    #5 attended   Group Type: recreation   Group Topic Covered: activity therapy, cognitive activities, and healthy leisure time       Group Session Detail:  TR leisure group     Patient Response/Contribution:  cooperative with task       Patient Detail:    Pt participated in Therapeutic Recreation group with focus on leisure participation, communication skills, and critical thinking. Pt participated in the group recreational activity via a group game.  Pt entered group a little late and participated for approximately 30 minutes. Pt was easily distracted throughout group and was hyperverbal. Pt needed extra assistance during her turn to help her focus, but easily got off subject.        Activity Therapy Per 15 min ()      Patient Active Problem List   Diagnosis    Unusual change in behavior    Psychosis, unspecified psychosis type (H)    Intractable vomiting    Phuong (H)    Bipolar I disorder, most recent episode (or current) manic (H)

## 2025-02-27 NOTE — PLAN OF CARE
Problem: Adult Inpatient Plan of Care  Goal: Plan of Care Review  Description: The Plan of Care Review/Shift note should be completed every shift.  The Outcome Evaluation is a brief statement about your assessment that the patient is improving, declining, or no change.  This information will be displayed automatically on your shift  note.    Outcome: Progressing     Goal Outcome Evaluation:    Patient was calm, sitting at the lounge area socializing with the  and peers. Patient was in a happy mood, pt was observed laughing and talking. Pt came at the medication window after the  left. Requested HS medications, PRN melatonin for sleep and cold ice pack for back pain. Took the medications and went to her room. Pt denied SI/HI. Patient contracted for safety.

## 2025-02-27 NOTE — PROGRESS NOTES
Rehab Group    Start time: 1315  End time: 1400  Patient time total: 40 minutes    attended partial group    #4 attended   Group Type: music   Group Topic Covered: coping skills and mindfulness     Group Session Detail: Cooperatively engaged in Afternoon Music Relaxation group to decrease anxiety and promote mood uplift and regulation.        Patient Response/Contribution:  positive affect and cooperative with task, loose/somewhat disinhibited affect     Patient Detail: Talkative affect, well engaged in session, responding well to the music, peers, and Music Therapist.  Pt maintained an upbeat mood during MT group today, often sharing what was on her mind r/t the music.   Pt selected a song as a dedication to her , whom she has fond feelings towards.    Pt was accepting of limits and required no redirections.  Pt states she is hoping for discharge soon, although writer did not find confirmation of this.       Activity Therapy Per 15 min ()    Patient Active Problem List   Diagnosis    Unusual change in behavior    Psychosis, unspecified psychosis type (H)    Intractable vomiting    Phuong (H)    Bipolar I disorder, most recent episode (or current) manic (H)

## 2025-02-27 NOTE — PLAN OF CARE
Radha noted sleeping for 1 hour tonight. She  was up most of the night frequently seeking out staff.  She reported to the writer that their was water on her floor in her room. Writer went with her to the room and  noted  that Radha had spill water on the floor and was trying  to clean it up with her bed linen. RN cleaned cup the water off the floor and  noted that Radha also had 2 more cups filled with water in her room.  Both  cups were emptied and  thrown in the trash with Radha agreement.    PRN utilized this shift are:  Maalox for heartburn  Tylenol for headache 4/10  Atarax for Anxiety/restlessness.      Problem: Sleep Disturbance  Goal: Adequate Sleep/Rest  Outcome: Not Progressing   Goal Outcome Evaluation:

## 2025-02-27 NOTE — PLAN OF CARE
"RN Assessment    SI: denies  SIB: denies  HI: denies  Aggression/Agitation: none  AVH: denies, pt does talk to self, but unclear whether she is responding to internal stimuli    Thought process: disorganized, delusional, paranoid, suspicious, grandiose. Pt tells this writer \"you never know who you can trust\" as she looked around her at peers and staff. Pt made statements that she has \"a little more future insight than some\" insinuating that she knows the future. Pt then stated that she can tell more about a person from looking at them than other people can. Pt went on to tell writer names of specific staff and that they \"are bad, have bad intentions deep down inside\". Pt made multiple other paranoid/delusional statements regarding peers to another RN. See progress note.    Sleep: only 1 hour recorded on NOC. Awake all day shift.  Affect & Mood: affect elated, mood is labile but mostly pleasant this shift    Behavior: Med compliant. While checking her BP this morning, pt suddenly stated \"it's so tight, it's so tight, I feel like I wanna hit someone\". Pt was able to breathe through it with prompting. Pt visible in the milieu and social with peers. Speech is rambling, disorganized, tangential. pt often observed talking to self in lounge. When asked who she's talking to, pt states \"myself, I talk to myself all the time, you know that domenica with 2 brains, it's like that it's me talking to me\". Unclear whether pt is responding to internal stimuli.    Pt starting scheduled haldol tonight at . Pt was given handout and education by providers.     Possible Medication SE: none reported or observed  Hygiene: adequate- showered this shift  VS: WDL  Pain: reports head aching, points to left occipital bone area and states it radiates across left side of head and reports a \"lump\". Provider notified.     Neuro checks: WDL, no concerns noted. Provider notified and neuro check order discontinued    Medical Concerns: pt reports she " "feels \"babcock, babcock\" and then clarifies she means dizziness intermittently. Pt was educated to sit down if having dizziness spells. Pt has edema in bilateral lower legs and feet, non-pitting- Provider aware.  Cold/Flu-like symptoms: none  Appetite & Fluid intake: adequate   Bowel & Bladder: no issues reported      PRN medications utilized this shift include:  Tylenol 975 mg-- headache, see above-- helpful    Problem: Manic Symptoms  Goal: Manic Symptoms  Description: Signs and symptoms of listed problems will be absent or manageable.  Outcome: Not Progressing  Flowsheets (Taken 2/27/2025 1023)  Manic Symptoms Present:   anxiety   orientation   insight   thought process   affect   mood   speech   sleep  Plan of care reviewed with: patient  "

## 2025-02-27 NOTE — PROGRESS NOTES
"Pt appears to be experiencing paranoid delusions. Approx 1000 pt whispered to writer \"they're hurting her\" motioning to the room of  peer F.M. When writer asked pt how FM was being hurt, pt just stared at writer. Writer asked if pt would like to step into her room to discuss this further. Pt declined and asked for 1 banana stating she did not want to ask for more than that as she didn't want to break any rules. Pt then asked asked an was a assured she could eat the banana in her room.    Approx 1015, Pt asked RN writer into her room and shut the door, stepped into the corner of her room and shared concerns that \"they're hurting her\" indicating peer F.M., she also stated \"they are hurting peers M.M. and A.M\" She went on to state that \"Zachariaho is the really bad one bc she's in the middle of all the pictures\" indicating. Indicating provider. Pt also stated that peer A.M. is \"being set up as a eli for the death of James Rivera, his wife and his dog\". Pt also stated that peer JE is \"working with Laruso bc he wears those ear plugs every day and he has sunglasses. He doesn't want you to see his eyes\".   "

## 2025-02-28 PROCEDURE — 250N000013 HC RX MED GY IP 250 OP 250 PS 637: Performed by: STUDENT IN AN ORGANIZED HEALTH CARE EDUCATION/TRAINING PROGRAM

## 2025-02-28 PROCEDURE — 124N000002 HC R&B MH UMMC

## 2025-02-28 PROCEDURE — 250N000013 HC RX MED GY IP 250 OP 250 PS 637: Performed by: REGISTERED NURSE

## 2025-02-28 PROCEDURE — 99232 SBSQ HOSP IP/OBS MODERATE 35: CPT | Mod: GC | Performed by: PSYCHIATRY & NEUROLOGY

## 2025-02-28 PROCEDURE — 250N000013 HC RX MED GY IP 250 OP 250 PS 637: Performed by: ANESTHESIOLOGY

## 2025-02-28 PROCEDURE — H2032 ACTIVITY THERAPY, PER 15 MIN: HCPCS

## 2025-02-28 PROCEDURE — 250N000013 HC RX MED GY IP 250 OP 250 PS 637

## 2025-02-28 RX ORDER — IBUPROFEN 200 MG
400 TABLET ORAL EVERY 6 HOURS PRN
Status: DISCONTINUED | OUTPATIENT
Start: 2025-02-28 | End: 2025-03-03 | Stop reason: HOSPADM

## 2025-02-28 RX ORDER — LIDOCAINE 4 G/G
1 PATCH TOPICAL
Status: DISCONTINUED | OUTPATIENT
Start: 2025-02-28 | End: 2025-03-03 | Stop reason: HOSPADM

## 2025-02-28 RX ORDER — MUSCLE RUB CREAM 100; 150 MG/G; MG/G
CREAM TOPICAL EVERY 6 HOURS PRN
Status: DISCONTINUED | OUTPATIENT
Start: 2025-02-28 | End: 2025-03-03 | Stop reason: HOSPADM

## 2025-02-28 RX ORDER — LIDOCAINE 4 G/G
1 PATCH TOPICAL
Status: DISCONTINUED | OUTPATIENT
Start: 2025-02-28 | End: 2025-02-28

## 2025-02-28 RX ADMIN — ATENOLOL 25 MG: 25 TABLET ORAL at 10:21

## 2025-02-28 RX ADMIN — OXCARBAZEPINE 300 MG: 150 TABLET, FILM COATED ORAL at 20:05

## 2025-02-28 RX ADMIN — HALOPERIDOL 5 MG: 5 TABLET ORAL at 10:21

## 2025-02-28 RX ADMIN — ATORVASTATIN CALCIUM 40 MG: 10 TABLET, FILM COATED ORAL at 10:20

## 2025-02-28 RX ADMIN — LIDOCAINE 4% 1 PATCH: 40 PATCH TOPICAL at 16:11

## 2025-02-28 RX ADMIN — OXCARBAZEPINE 300 MG: 150 TABLET, FILM COATED ORAL at 10:20

## 2025-02-28 RX ADMIN — ASPIRIN 81 MG CHEWABLE TABLET 81 MG: 81 TABLET CHEWABLE at 10:21

## 2025-02-28 RX ADMIN — ALUMINUM HYDROXIDE, MAGNESIUM HYDROXIDE, AND SIMETHICONE 30 ML: 200; 200; 20 SUSPENSION ORAL at 21:16

## 2025-02-28 RX ADMIN — ACETAMINOPHEN 975 MG: 325 TABLET, FILM COATED ORAL at 20:05

## 2025-02-28 RX ADMIN — ACETAMINOPHEN 975 MG: 325 TABLET, FILM COATED ORAL at 10:55

## 2025-02-28 RX ADMIN — HALOPERIDOL 10 MG: 10 TABLET ORAL at 20:06

## 2025-02-28 RX ADMIN — IBUPROFEN 400 MG: 200 TABLET, FILM COATED ORAL at 17:02

## 2025-02-28 ASSESSMENT — ACTIVITIES OF DAILY LIVING (ADL)
ADLS_ACUITY_SCORE: 38
DRESS: SCRUBS (BEHAVIORAL HEALTH);INDEPENDENT
ADLS_ACUITY_SCORE: 38
LAUNDRY: UNABLE TO COMPLETE
ADLS_ACUITY_SCORE: 38
HYGIENE/GROOMING: INDEPENDENT
ADLS_ACUITY_SCORE: 38
ORAL_HYGIENE: INDEPENDENT
ADLS_ACUITY_SCORE: 38

## 2025-02-28 NOTE — PLAN OF CARE
BEH IP Unit Acuity Rating Score (UARS)  Patient is given one point for every criteria they meet.    CRITERIA SCORING   On a 72 hour hold, court hold, committed, stay of commitment, or revocation. 0    Patient LOS on BEH unit exceeds 20 days. 0  LOS: 6   Patient under guardianship, 55+, otherwise medically complex, or under age 11. 0   Suicide ideation without relief of precipitating factors. 0   Current plan for suicide. 0   Current plan for homicide. 0   Imminent risk or actual attempt to seriously harm another without relief of factors precipitating the attempt. 0   Severe dysfunction in daily living (ex: complete neglect for self care, extreme disruption in vegetative function, extreme deterioration in social interactions). 1   Recent (last 7 days) or current physical aggression in the ED or on unit. 0   Restraints or seclusion episode in past 72 hours. 0   Recent (last 7 days) or current verbal aggression, agitation, yelling, etc., while in the ED or unit. 1   Active psychosis. 1   Need for constant or near constant redirection (from leaving, from others, etc).  1   Intrusive or disruptive behaviors. 1   Patient requires 3 or more hours of individualized nursing care per 8-hour shift (i.e. for ADLs, meds, therapeutic interventions). 0   TOTAL 5

## 2025-02-28 NOTE — PROGRESS NOTES
"Rehab Group     Start time: 1015  End time: 1400  (With break for pt lunchtime in between)  Patient time total: 45 minutes     attended partial groups     #7 attended   Group Type: music   Group Topic Covered: anger/conflict management, emotional regulation, healthy leisure time, and mindfulness      Group Session Detail: Engaged in a series of three (3) daytime Music Relaxation groups, each designed to assess and treat varying states present in group participants.       Goals of session were to decrease tension and promote safety and harmony in the milieu.       Patient Response/Contribution:  calm, but loose/lightly disinhibited   Patient Detail: Pt engaged socially this morning during group with peer ([email or web address]) who is less stable than Radha.  Radha appeared to enjoy this social interaction- and it seemed to bring out her more \"organized\" side.    Radha was pleasant and participated in portions of both morning and afternoon sessions.  Radha's preferred music for therapeutic listening in \"The Beatles\".  She shared with writer a card from her , that Radha had written names of staff on the back of that were \"good\" and \"bad\" in her opinion.     Radha was willing to \"go with the flow\" in group, and also assert her own music tastes which differed from the predominant taste of the group as a whole, but was respectful of others in session, and others respected her.        Activity Therapy Per 15 min ()    Patient Active Problem List   Diagnosis    Unusual change in behavior    Psychosis, unspecified psychosis type (H)    Intractable vomiting    Phuong (H)    Bipolar I disorder, most recent episode (or current) manic (H)       "

## 2025-02-28 NOTE — PLAN OF CARE
"  Problem: Manic Symptoms  Goal: Manic Symptoms  Description: Signs and symptoms of listed problems will be absent or manageable.  Outcome: Progressing   Goal Outcome Evaluation:    Plan of Care Reviewed With: patient      Pt was visible in the milieu interacting with selected peers. Pt made some phone calls. Pt reported of heart burn after eating her diner meal. A prn of maalox gave a relief. Pt  reported lower back pain of 2/10 at the start of the shift. Declined prn for pain.  Pt denies psych symptoms including SI, HI, SIB, AVH, anxiety and paranoia. Pt has paranoid thinking about some peers in the unit. \" I am glad, I am moving to the next room, so I am not directly across this domenica.\" Pt reported mood as \" fine \" . Affect is bright and animated.  Her  visited her around 1900 . During the visit, she reported lower back pain. Tylenol 975 mg given at 1958 with no relief. [employer] provider was paged for assistance. Ibuprofen 600 mg was ordered and given to patient. Response is currently pending. Offered also warm packs in addition to ibuprofen. Pt is currently lying in bed. Medications compliant. Behavior in control.      "

## 2025-02-28 NOTE — PROGRESS NOTES
-------------------------------------------------------------------------------------  [facility], [place]   Psychiatric Progress Note  Hospital Day #6  Date of Service: 02/28/2025    Interval History:  The patient's care was discussed with the treatment team and chart notes were reviewed.    Sleep: 4.5 hours (02/28/25 0622)  PRN medications:   Last 24H PRN:     acetaminophen (TYLENOL) tablet 975 mg, 975 mg at 02/27/25 1958    alum & mag hydroxide-simethicone (MAALOX) suspension 30 mL, 30 mL at 02/27/25 1846    melatonin tablet 3 mg, 3 mg at 02/27/25 2020  Staff Report:   Radha noted sleeping for 1 hour tonight. She  was up most of the night frequently seeking out staff.  She reported to the writer that their was water on her floor in her room. Writer went with her to the room and  noted  that Radha had spill water on the floor and was trying  to clean it up with her bed linen. RN cleaned cup the water off the floor and  noted that Radha also had 2 more cups filled with water in her room.  Both  cups were emptied and  thrown in the trash with Radha agreement.    Patient Interview:   Seen in her room, states today is better as she was able to get some sleep (reports approximately three hours) however wrote down two questions for writer. First was to address lower back pain and asks for tylenol/ibuprofen (which is what she uses at home),  the second was to address rash on her hands and back of her legs. Rash observed near thumbs but not on legs despite clinical exam, Radha states these started before arrival and isnt sure if this has worsened with her being here. Responsive to emolient, although notes that it has become more itchy than prior. ROS otherwise unremarkable. States that she is feeling good on the unit, no issues with her current medications. Hasn't noticed much difference with Haldol. Does think that she needs to be in the hospital to help her sleep get on track, expresses  "that she trusts both Dr. Sears and resident Dr. Ross after talking yesterday. Would like to end interview to return to lunch, however notes that she worries that her leg swelling is getting worse. Attributes this to eating more salt than she does at home, does note some fatigue and occasional weakness at times but isnt sure if this is due to back pain. Agreeable to medicine consult given cardiac history and change from baseline. Reiterates that she is glad for working relationship, states writer is \"earning her trust\" but \"I have my eye on everyone\" before laughing.    Call to , Jose: Provided update to Jose per his and Radha's requests. He continues to endorse that she is calling him with paranoid statements, appears preoccupied at times on unit activities. States he has been reassuring her that staying in the hospital is a good idea and would like her to continue getting treatment, she has been telling him that she is leaving on Monday however he tries to tell her that it's better that she stays until her sleep is under control. Notes that she told him she is upset that doctors even referenced bipolar disorder out of context, states this is very triggering for her still and thinks maybe she would be more responsive once she better stabilized.    Collateral from OP provider Dr. Torres - gave call to team to provide ifnormation on prior care. States that she has not seen Radha since 2023 but does note that she was always adamanty against a bipolar diagnosis and corroborated similar reports to what Radha has shared with inpatient treatment team with feeling misdiagnosed, not being responsive to attempts at education. Her clinical opinion is that bipolar diagnosis is accurate based on the year+ they worked together, she was always very resistant towards medications and trialing this even when depressed which is why ECT was considered given that depression was quite severe. Would always ask to go down on " "medications and discontinue, which she only supported because patient refused to consider alternatives. Ginette, her therapist, is through their clinic.     Physical Examination:  /87   Pulse 72   Temp 97.2  F (36.2  C) (Temporal)   Resp 18   Ht 1.651 m (5' 5\")   Wt 113.9 kg (250 lb 15.9 oz)   SpO2 98%   BMI 41.77 kg/m    Weight is 250 lbs 15.9 oz  Body mass index is 41.77 kg/m .    Appearance:  awake, alert and neatly groomed post shower.   Attitude:   calm, not suspicious today  Eye Contact:  staring  Mood:  \"Better\"  Affect:  intensity is heightened, labile, and reactive, quick to become tearful and then stop crying  Speech:  increased speech latency, pressured speech, and rambling however latency is slowed today compared to prior   Psychomotor Behavior:  sitting comfortably without difficulty.  no evidence of tardive dyskinesia, dystonia, or tics  Thought Process:  tangential and circumstantial, subtly more organized today  Thought Content:  no evidence of suicidal ideation or homicidal ideation; denies AH. Still some subtle references to paranoia, however   Insight:  poor due to ongoing psychosis, lauren. Continues to minimize and defend all mental health symptoms and history despite evidence of the contrary from multiple sources, however does appear to recognize changes and concerns that she would like to discuss  Judgment:  limited but able to recognize benefit of staying for treatment when provided reassurance and clinical evidence, does understand and recognize need to stay hospitalized to change medications to assist with goals of discharging and not having to return to the hospital at a later point  Oriented to:  time, person, and place  Attention Span and Concentration:  fair  Recent and Remote Memory:  intact  Language:  English with appropriate syntax and vocabulary  Fund of Knowledge: appropriate  Muscle Strength and Tone: normal  Gait and Station: Normal    Liver/kidney function Metabolic " CBC   Recent Labs   Lab Test 02/20/25  1436 01/03/24  1540 01/02/24  1633   CR 0.91 0.77 0.82   AST  --  27 32   ALT  --  24 28   ALKPHOS  --  59 79    Recent Labs   Lab Test 02/23/25  1245 12/31/23  1031 02/23/21  0739 02/22/21  1403   CHOL  --   --  212*  --    TRIG  --  122 114  --    LDL  --   --  123*  --    HDL  --   --  66  --    A1C 5.8*  --   --   --    TSH  --   --   --  1.36    Recent Labs   Lab Test 02/20/25  1436   WBC 11.5*   HGB 15.8*   HCT 45.4   MCV 92             Lab results in the last 24 hours:  No results found for this or any previous visit (from the past 24 hours).      Assessment:  Diagnoses:  # Bipolar disorder, current episode manic, with psychotic features  # Anxiety, unspecified   # R/o cannabis use disorder    Radha is a 59 year old female with a history of bipolar disorder with psychotic features - with medical history of CAD, NSTEMI (2021 with 70% stenosis), HLD, and high BMI -  who was admitted on 2/23/25 with concerns of lauren and psychosis in the context of not believing in her diagnosis and titrating off her psych medications ~1 year ago.  Last hospitalized in 2021 under similar circumstances requiring 35mg Zyprexa for stabilization, chart review indicates several depressive episodes since that time that required ECT.  Predominant symptoms this admission included mood lability with frequent tearfulness, sleep issues, hyperverbal pressured speech, and disorganization with references to paranoia and AVH in ED(which was later noted on inpatient unit on 2/26) . She demonstrates poor insight into her symptoms both presently and when at baseline, largely minimizing her symptoms/experiences with belief that she does not meet criteria given that her brother's bipolar is significantly worse - her  and prior providers have attempted to challenge and provide education around this, however this does not appear to have been successful. Substance use does appear to be contributing  "given patient does appear to have recently returned to smoking in the last two months and has been continuing to use marijuana 3-4 times a week to reduce stress.    Per available chart review and collateral reports (OP psychiatry notes unavailable), lauren was historically stabilized with 35mg Zyprexa and depression was considered \"treatment resistant\" per  requiring ECT. At some point between [id number] she was transitioned to Latuda 40mg BID and Lexapro 10mg, however appears to have titrated off all medications by mid 2023. She historically saw Dr. Nay Torres through [facility] and has an individual therapist. Her  and therapist appears to be her primary social support.     Diagnostically, patient presentation appears consistent with Bipolar Disorder, current episode manic, with psychotic features. Decompensation appears to be secondary to being off psychotropic medications, may also have been intensified with ongoing cannabis use. When admitted to station 12 voluntarily , patient was initially cooperative to treatment and agreed to start Trilleptal on 2/23 after refusing [place] (\"I heard bad things about it\") and Depakote (concern for weight gain). Patient later appeared to decompesate further on the evening of 2/25 and demonstrated evidence of responding to internal stimuli, worsening disorganization, and hyperfixation on peers (seeing them as \"good\"/\"bad\",  writing secret letters, banging head on wall when distressed by behaviors, spilling water on another patient due to feeling they were \"an energy vampire\" that couldn't be trusted).  On 2/26, became more verbally aggressive and demonstrated new mistrust of unit/treatment staff, referencing medications being \"poison\" and saying that she will  \"never be taking medication from anyone ever again\" so no medication changes were made. Today on 2/27 appears more open, however becomes quickly guarded and paranoid with any perceived reference to her " "having bipolar disorder and refusal to allow this diagnosis. Does however recognize that she has symptoms of psychosis and poor sleep and here demonstrates capacity insofar as recognizing treatment for those two symptoms. As she is labile and quick to consider discontinuing treatment and rejecting medical care when perceiving even subtle allusions to her diagnosis (which is clinically supported), will defer from further education until she is better stabilized. Was agreeable and able to explain risks vs benefits of treatment with teaching for increasing Trilleptal and starting Haldol today.      Initially planned for transfer to another unit due to being low acuity, however does presently at this time with increasing behaviors meet ongoing criteria for station 12 admission. Medicine consults pending for rash (which does not appear 2/2 trilleptal/med changes per timing) and LE swelling, which is nonpitting but concerning given prior cardiac history.       Clinically Significant Risk Factors                              # Severe Obesity: Estimated body mass index is 41.77 kg/m  as calculated from the following:    Height as of this encounter: 1.651 m (5' 5\").    Weight as of this encounter: 113.9 kg (250 lb 15.9 oz).             Plan:    Changes today:  -No medication changes today  -Low carb diet per patient request, Lidocaine patch added PRN for pain, can alternate Tylenol/Advil for pain relief    Medications:  -Haldol 5mg, 10mg at bedtime (new, first dose tonight)  -Trilleptal 300mg BID (first dose tonight)    PRN:  -B52 oral/IM available q8h for agitation/aggression  -Hydroxyzine 25mg q4h for anxiety (first line)  -Ativan 1mg q4h for anxiety (second line), insomnia  Patient will be treated in therapeutic milieu with appropriate individual and group therapies as described.    Other:  -Discuss cannabis use when patient is more stabilized    Medical diagnoses to be addressed this admission:      #CAD w/ history of " NSTEMI 2021 (70% stenosis)  #HTN  #HLD  #Prediabetes   #Obesity, BMI >40  Follows with PCP, cardiology on outpatient basis.  confirms that patient has been compliant with medications at home. A1C 5.8% this admission (high end of normal to early prediabetes), lipids 07/2024 unremarkable outside of slight elevation in triglycerides. Patient has lost 30lbs while dieting with her  in last year with motivation to improve fitness. Notably does have some swelling in her legs which patient reports is worse than baseline, however nonpitting and unchanged since admission. Unclear etiology, would benefit from follow up with medicine consult given cardiac history.   -Atenalol 25mg  -Atorvastatin 50mg  -ASA 81mg    Consults:   None at this time    Psychiatric Hospital course:   Radha James was admitted to [facility] 12 on 2/22/2025 after presenting to the ED with lauren. Her PTA medications were continued however psychiatric medications are unclear at this time.  -2/23: Started on oxcarbazepine 150mg BID  -2/24: No medication changes. Evidence of ongoing lauren, engaged in treatment, although remains hyperverbal and intrusive to others with poor boundaries in [facility]. Deferred medication changes to provide time to get more collateral from , OP provider due to unclear prior PTA regimen (patient agreeable to this).   -2/25: MD notified because  brought Pepto-Bismol to patient from outside the hospital and given it to her, which was later discovered after she had stuffed pills in crevice on chair. Firm education and clarification of unit policies provided to  and patient, visitation rights restricted to main lobby only. No evidence of further medications being given.   -2/26: Continuing to only sleep 2-3 hours. Notably more preoccupied, disorganized, and labile today compared to prior. Appears to be more agitated and labile towards others, responding to internal stimuli, and hyperfixating on  "peers (seeing them as \"good\"/\"bad\",  writing secret letters, banging head on wall when distressed by behaviors, spilling water on another patient due to feeling they were \"an energy vampire\" that couldn't be trusted).  On 2/26, became more verbally aggressive and demonstrated new mistrust of unit/treatment staff, referencing medications being \"poison\" and saying that she will  \"never be taking medication from anyone ever again\". Treatment team had planned to discuss starting Haldol and increasing Trilleptal with her today, however adamantly refused to meet and discuss options.   -2/27: Continues to demonstrate symptoms of lauren and psychosis, 1 hr of sleep overnight. Adamantly refusing bipolar disorder diagnosis but does have insight into  insomnia and psychosis, so agreeable to start Haldol 5mg daily + 10mg at bedtime and increase Trilleptal to 300mg.     Legal Status:   Orders Placed This Encounter      Voluntary      Disposition: TBD, pending stabilization & development of a safe discharge plan.       Safety Assessment:   Behavioral Orders   Procedures    Code 1 - Restrict to Unit    Routine Programming     As clinically indicated    Status 15     Every 15 minutes.       Current Facility-Administered Medications   Medication Dose Route Frequency Provider Last Rate Last Admin    aspirin (ASA) chewable tablet 81 mg  81 mg Oral Daily Keyshawn Early MD   81 mg at 02/27/25 0840    atenolol (TENORMIN) tablet 25 mg  25 mg Oral Daily Buzz Sanders APRN CNP   25 mg at 02/27/25 0841    atorvastatin (LIPITOR) tablet 40 mg  40 mg Oral Daily Buzz aSnders APRN CNP   40 mg at 02/27/25 0841    haloperidol (HALDOL) tablet 10 mg  10 mg Oral At Bedtime Paris Ross MD   10 mg at 02/27/25 2019    haloperidol (HALDOL) tablet 5 mg  5 mg Oral Daily Paris Ross MD        OXcarbazepine (TRILEPTAL) tablet 300 mg  300 mg Oral BID Paris Ross MD   300 mg at 02/27/25 2020     Current Facility-Administered " Medications   Medication Dose Route Frequency Provider Last Rate Last Admin    acetaminophen (TYLENOL) tablet 975 mg  975 mg Oral Q6H PRN Lubna Jasmine MD   975 mg at 02/27/25 1958    alum & mag hydroxide-simethicone (MAALOX) suspension 30 mL  30 mL Oral Q4H PRN Buzz Sanders APRN CNP   30 mL at 02/27/25 1846    haloperidol (HALDOL) tablet 5 mg  5 mg Oral Q8H PRN Paris Ross MD        And    LORazepam (ATIVAN) tablet 2 mg  2 mg Oral Q8H PRN Paris Ross MD   2 mg at 02/26/25 1040    And    diphenhydrAMINE (BENADRYL) capsule 50 mg  50 mg Oral Q8H PRN Paris Ross MD        haloperidol lactate (HALDOL) injection 5 mg  5 mg Intramuscular Q8H PRN Paris Ross MD        And    LORazepam (ATIVAN) injection 2 mg  2 mg Intramuscular Q8H PRN Paris Ross MD        And    diphenhydrAMINE (BENADRYL) injection 50 mg  50 mg Intramuscular Q8H PRN Paris Ross MD        hydrOXYzine HCl (ATARAX) tablet 25 mg  25 mg Oral Q4H PRN Buzz Sanders APRN CNP   25 mg at 02/27/25 0426    LORazepam (ATIVAN) tablet 1 mg  1 mg Oral Q4H PRN Keyshawn Early MD   1 mg at 02/25/25 0041    melatonin tablet 3 mg  3 mg Oral At Bedtime PRN Buzz Sanders APRN CNP   3 mg at 02/27/25 2020    nicotine (NICORETTE) gum 2 mg  2 mg Buccal Q1H PRN Paris Ross MD        senna-docusate (SENOKOT-S/PERICOLACE) 8.6-50 MG per tablet 1 tablet  1 tablet Oral BID PRN Buzz Sanders APRN CNP           Allergies   Allergen Reactions    Zofran [Ondansetron] Nausea and Vomiting     Self reported     Patient seen and staffed with my attending physician    Paris Ross MD  Psychiatry Resident, PGY2    02/28/2025

## 2025-02-28 NOTE — PLAN OF CARE
"  Problem: Manic Symptoms  Goal: Manic Symptoms  Description: Signs and symptoms of listed problems will be absent or manageable.  Outcome: Progressing     Problem: Manic Symptoms  Goal: Social and Therapeutic (Manic Symptoms)  Description: Signs and symptoms of listed problems will be absent or manageable.  Outcome: Progressing   Goal Outcome Evaluation:    Patient was calm and cooperative.Patient visible in the milieu,most of the evening shift watching television.Patient reported experiencing lower back pain,at 1611 Lidocaine 4% patch was applied to lower back,and at 1702 Ibuprofen 400 mg tablets was administered.Patient later reported ibuprofen was helpful.Patient diet was adjusted from regular diet to low carbohydrates consistent meal plan (45g CHO per meal).Patient ate 100% of her dinner with out issues and hydrated well.Patient denies anxiety,depression and auditory/visual hallucinations.Patient also denies SI/SIB/HI. Patient was cooperative with assessment,vital sig monitoring and medication administration.Patient is medication complaints.No medication side effect was reported or observed.Patient was observed sleeping in her bed.No acute concern noted at this time.    Laboratory:Saturday 03/01/2025, Basic metabolic panel        /78   Pulse 72   Temp 98.5  F (36.9  C) (Temporal)   Resp 17   Ht 1.651 m (5' 5\")   Wt 113.9 kg (250 lb 15.9 oz)   SpO2 100%   BMI 41.77 kg/m            "

## 2025-02-28 NOTE — PHARMACY-ADMISSION MEDICATION HISTORY
Please see Admission Medication History completed on 2/20 by Tegan Zazueta under previous encounter at [facility] Emergency Department  for information regarding prior to admission medications.     Tegan Zazueta, PharmD/MSLD

## 2025-02-28 NOTE — PLAN OF CARE
Problem: Adult Inpatient Plan of Care  Goal: Optimal Comfort and Wellbeing  Outcome: Progressing   Goal Outcome Evaluation:             Less intrusive today. Minimizes symptoms. Denies having an mental illness. Spent time in the lounge with staff and peers. Is social in groups. Appropriate grooming any personal hygiene. Medication complaint. Is able to maintain safety on the unit. She is easily redirectable.

## 2025-02-28 NOTE — PLAN OF CARE
Team Note Due:  Monday     Assessment/Intervention/Current Symtoms and Care Coordination:  Chart review and met with team, discussed pt progress, symptomology, and response to treatment.  Discussed the discharge plan and any potential impediments to discharge.    Per team, pt still paranoid, some thought broadcasting towards the tv, reviewed fact sheet on haldol with the providers, threw it once she saw 'bipolar disorder'. Not petitioning for commitment at this time.     Pt visible in the milieu on both pods today intermittently and engaging in conversations with peers. She appears to be smiling and laughing.     Discharge Plan or Goal:  TBD  Home with supports/IOP vs IRTS?    Barriers to Discharge:  Manic symptomology      Referral Status:  TBD, Consider [facility] IOP     Legal Status:  Pt is voluntary.     Contacts (include ANIVAL status):  Primary Care Provider:  Name/Clinic: Paris Vaca MD/[facility]  Number: [phone]     Medication Management/Psychiatry:  Name/Clinic: Dr. Nay Torres MD/[facility] [place]    Number: [phone]     Therapist:  Name/Clinic: Ginette Mclaughlin [facility] [place]  Number: [phone]     Other contact information (family, friends, SO) and ANIVAL status:   Pt signed an ANIVAL for her  Jose.     Upcoming Meetings and Dates/Important Information and next steps:  Coordinate care

## 2025-02-28 NOTE — PLAN OF CARE
Radha appeared  less disorganized tonight.  Noted to have slept for 4.5 hours without distress tonight. Respirations are even and unlabored.  No discomfort or pain verbalized    No harm/ assault  to self and others noted or reported this shift.      Problem: Adult Inpatient Plan of Care  Goal: Plan of Care Review  Description: The Plan of Care Review/Shift note should be completed every shift.  The Outcome Evaluation is a brief statement about your assessment that the patient is improving, declining, or no change.  This information will be displayed automatically on your shift  note.  Outcome: Progressing  Goal: Absence of Hospital-Acquired Illness or Injury  Intervention: Prevent Skin Injury  Recent Flowsheet Documentation  Taken 2/28/2025 0556 by Mariama Calvillo RN  Body Position:   position changed independently   position maintained     Problem: Sleep Disturbance  Goal: Adequate Sleep/Rest  Outcome: Progressing   Goal Outcome Evaluation:

## 2025-03-01 LAB
ANION GAP SERPL CALCULATED.3IONS-SCNC: 10 MMOL/L (ref 7–15)
BUN SERPL-MCNC: 13.8 MG/DL (ref 6–20)
CALCIUM SERPL-MCNC: 9 MG/DL (ref 8.8–10.4)
CHLORIDE SERPL-SCNC: 105 MMOL/L (ref 98–107)
CREAT SERPL-MCNC: 0.8 MG/DL (ref 0.51–0.95)
EGFRCR SERPLBLD CKD-EPI 2021: 85 ML/MIN/1.73M2
GLUCOSE SERPL-MCNC: 106 MG/DL (ref 70–99)
HCO3 SERPL-SCNC: 24 MMOL/L (ref 22–29)
MAGNESIUM SERPL-MCNC: 2.9 MG/DL (ref 1.7–2.3)
POTASSIUM SERPL-SCNC: 4.5 MMOL/L (ref 3.4–5.3)
SODIUM SERPL-SCNC: 139 MMOL/L (ref 135–145)

## 2025-03-01 PROCEDURE — 97150 GROUP THERAPEUTIC PROCEDURES: CPT | Mod: GO

## 2025-03-01 PROCEDURE — 250N000013 HC RX MED GY IP 250 OP 250 PS 637: Performed by: STUDENT IN AN ORGANIZED HEALTH CARE EDUCATION/TRAINING PROGRAM

## 2025-03-01 PROCEDURE — 250N000013 HC RX MED GY IP 250 OP 250 PS 637: Performed by: ANESTHESIOLOGY

## 2025-03-01 PROCEDURE — 250N000013 HC RX MED GY IP 250 OP 250 PS 637: Performed by: REGISTERED NURSE

## 2025-03-01 PROCEDURE — 80048 BASIC METABOLIC PNL TOTAL CA: CPT | Performed by: PSYCHIATRY & NEUROLOGY

## 2025-03-01 PROCEDURE — 124N000002 HC R&B MH UMMC

## 2025-03-01 PROCEDURE — 250N000013 HC RX MED GY IP 250 OP 250 PS 637

## 2025-03-01 PROCEDURE — 83735 ASSAY OF MAGNESIUM: CPT

## 2025-03-01 PROCEDURE — 36415 COLL VENOUS BLD VENIPUNCTURE: CPT | Performed by: PSYCHIATRY & NEUROLOGY

## 2025-03-01 PROCEDURE — 99222 1ST HOSP IP/OBS MODERATE 55: CPT

## 2025-03-01 RX ORDER — VIT E ACET/GLY/DIMETH/WATER
LOTION (ML) TOPICAL DAILY
Status: DISCONTINUED | OUTPATIENT
Start: 2025-03-01 | End: 2025-03-03 | Stop reason: HOSPADM

## 2025-03-01 RX ADMIN — HALOPERIDOL 5 MG: 5 TABLET ORAL at 08:04

## 2025-03-01 RX ADMIN — OXCARBAZEPINE 300 MG: 150 TABLET, FILM COATED ORAL at 08:05

## 2025-03-01 RX ADMIN — Medication: at 16:13

## 2025-03-01 RX ADMIN — ACETAMINOPHEN 975 MG: 325 TABLET, FILM COATED ORAL at 16:12

## 2025-03-01 RX ADMIN — ATENOLOL 25 MG: 25 TABLET ORAL at 08:05

## 2025-03-01 RX ADMIN — HALOPERIDOL 10 MG: 10 TABLET ORAL at 20:06

## 2025-03-01 RX ADMIN — ASPIRIN 81 MG CHEWABLE TABLET 81 MG: 81 TABLET CHEWABLE at 08:04

## 2025-03-01 RX ADMIN — OXCARBAZEPINE 300 MG: 150 TABLET, FILM COATED ORAL at 20:06

## 2025-03-01 RX ADMIN — ATORVASTATIN CALCIUM 40 MG: 10 TABLET, FILM COATED ORAL at 08:04

## 2025-03-01 RX ADMIN — IBUPROFEN 400 MG: 200 TABLET, FILM COATED ORAL at 04:03

## 2025-03-01 ASSESSMENT — ACTIVITIES OF DAILY LIVING (ADL)
ADLS_ACUITY_SCORE: 38
DRESS: SCRUBS (BEHAVIORAL HEALTH);INDEPENDENT
ADLS_ACUITY_SCORE: 38
ADLS_ACUITY_SCORE: 38
LAUNDRY: UNABLE TO COMPLETE
ADLS_ACUITY_SCORE: 38
ORAL_HYGIENE: INDEPENDENT

## 2025-03-01 NOTE — PLAN OF CARE
Problem: Adult Inpatient Plan of Care  Goal: Readiness for Transition of Care  Outcome: Not Progressing   Goal Outcome Evaluation:       Radha spent much time in the lounge today. She did take a morning nap. She attended group and participated. She is easily redirectable. She has been on the phone several times today. She has seen by internal medicine for follow-up on her bilateral edema. The plan is follow up with PCP outpatient for further evaluation and plan for outpatient echo given hx of CAD. She agreed to do so. She is medication compliant. She denies pain or discomfort. Her  visited today. She denies SI/HI. Her speech is pressured. She denies she has any s/s of a mental illness.

## 2025-03-01 NOTE — PLAN OF CARE
Problem: Sleep Disturbance  Goal: Adequate Sleep/Rest  Outcome: Progressing   Goal Outcome Evaluation:      Patient had difficult falling asleep. Stayed in her room majority of the shift, but slept just about 2 hours. Safety checks conducted per unit policy, no psychosis or abnormal behavior noted. Requested and received 400 mg PRN ibuprofen for back pain @ 0400,pending results.

## 2025-03-01 NOTE — CONSULTS
[facility] [place]  Consult Note - Hospitalist Service  Date of Admission:  2/22/2025  Consult Requested by: Dr. Sears   Reason for Consult: Increased LE swelling bilaterally, rash on hands     Assessment & Plan   Radha James is a 59 year old female admitted on 2/22/2025. She has a past medical history of HTN, HLT, bipolar I, depression, cannabis use, tobacco use, prediabetes, CAD s/p ROSA to OM2 2021. She was admitted to psychiatry with lauren. Internal medicine was consulted for lower extremity edema and hand rash.   ________________________    # Bilateral trace pedal edema  Was consulted by psychiatry for lower extremity swelling bilaterally.  On exam trace pedal edema noted but otherwise ankles and lower calves with adipose tissue and no edema. Denied increased dyspnea on exertion or chest pain and creatinine is within normal limits. Echo staff are overbooked and she does not have any symptoms of acute heart failure and is well appearing - planning to discharge early this week. Okay for outpatient echo. She is well appearing, pleasant, and HDS on room air.   - Compression stockings ordered   - Recommend follow up with PCP outpatient for further evaluation and plan for outpatient echo given hx of CAD    # Bilateral hand dryness    Dry skin on dorsum of hand with no erythema, cracking.   - Cetaphil lotion ordered     # HTN  - Continue pta atenolol     # HLD  - Continue pta lipitor   - Lifestyle modifications with diet and exercise and follow up with PCP     # Bipolar I disorder  # Depression   # Cannabis, tobacco use disorder   Presented to ED with lauren. Admitted to psychiatry.    - Continue haldol   - As managed per psychiatry   - NRT per psychiatry     # CAD  S/p ROSA to OM2 5/27/2021 (70% significant stenosis). Last seen by Dr. Godinez 4/2023 - appt scheduled 4/2025.   - Continue pta aspirin   - Follow up with cardiology     # Prediabetes   A1C 5.8 on 3/1/25.   - Follow  up with PCP to discuss ways to lower A1C and for recheck       Constitutional: awake, alert, cooperative, well appearing   Respiratory: No increased work of breathing, good air exchange, clear to auscultation bilaterally, no crackles or wheezing  Cardiovascular: Normal apical impulse, regular rate and rhythm, and no murmur noted  Skin: hands with coarse, dry skin on lateral dorsum but no erythema or rash, no swelling or drainage   Musculoskeletal: There is no redness, warmth, or swelling of the visible joints, trace pedal edema         Data   Recent Labs   Lab 03/01/25  0807      POTASSIUM 4.5   CHLORIDE 105   CO2 24   BUN 13.8   CR 0.80   ANIONGAP 10   WENDI 9.0   *

## 2025-03-01 NOTE — PLAN OF CARE
Rehab Group    Start time: 1115  End time: 1200  Patient time total: 45 minutes     attended full group     #5 attended   Group Type: occupational therapy   Group Topic Covered: cognitive activities, coping skills, and healthy leisure time   Group Session Detail: OT: Education on cognitive wellness and interactive social activity ([place]) to increase concentration, focus, attention to task/detail, task follow through, memory recall, healthy leisure engagement, coping with stress, heathy distraction engagement, cognitive wellness, social wellness, and social engagement    Patient Response/Contribution: cooperative with task, actively engaged, pleasant   Patient Detail: Pt arrived late to group and stayed for remainder. Pt sat among peers to complete presented activity and engaged in reciprocal social interactions with peers and therapist. Pt able to follow verbal directions for novel activity. Pt able to independently identify several responses and provided verbal support to peer who was struggling to identify a response. Pt reported she enjoyed the activity and verbalized understanding of importance of cognitive wellness in a healthy lifestyle.        [address] OT Group (2 or more in attendance)    Patient Active Problem List   Diagnosis    Unusual change in behavior    Psychosis, unspecified psychosis type (H)    Intractable vomiting    Phuong (H)    Bipolar I disorder, most recent episode (or current) manic (H)

## 2025-03-01 NOTE — PLAN OF CARE
"Goal Outcome Evaluation:    Plan of Care Reviewed With: patient      Patient spent the majority of the evening shift in the milieu.Patient was observed watching television and appropriately socializing with peers and staff.Patient continues experiencing lower back pain and requested tylenol 975 mg tablets.At  1612 Tylenol 975 mg tablets was administered and  was reported helpful.Patient denies mental health issues including anxiety,depression and Auditory/visual hallucinations. Patient also denies SI/SIB/HI.Patient ate 100 her dinner and hydrated well.Patient accepted her evening medication without incidence.No medication side effect was reported or observed.Patient was cooperative with vitals, which is stable.Patient sleeping in her bed  with out distress.    /73 (Patient Position: Sitting, Cuff Size: Adult Regular)   Pulse 72   Temp 98.3  F (36.8  C) (Oral)   Resp 18   Ht 1.651 m (5' 5\")   Wt 113.9 kg (250 lb 15.9 oz)   SpO2 99%   BMI 41.77 kg/m      "

## 2025-03-02 VITALS
WEIGHT: 250.99 LBS | SYSTOLIC BLOOD PRESSURE: 128 MMHG | HEIGHT: 65 IN | RESPIRATION RATE: 18 BRPM | DIASTOLIC BLOOD PRESSURE: 78 MMHG | OXYGEN SATURATION: 98 % | HEART RATE: 68 BPM | TEMPERATURE: 97.7 F | BODY MASS INDEX: 41.82 KG/M2

## 2025-03-02 PROCEDURE — 124N000002 HC R&B MH UMMC

## 2025-03-02 PROCEDURE — 250N000013 HC RX MED GY IP 250 OP 250 PS 637: Performed by: ANESTHESIOLOGY

## 2025-03-02 PROCEDURE — 250N000013 HC RX MED GY IP 250 OP 250 PS 637

## 2025-03-02 PROCEDURE — 250N000013 HC RX MED GY IP 250 OP 250 PS 637: Performed by: REGISTERED NURSE

## 2025-03-02 RX ADMIN — OXCARBAZEPINE 300 MG: 150 TABLET, FILM COATED ORAL at 20:08

## 2025-03-02 RX ADMIN — ASPIRIN 81 MG CHEWABLE TABLET 81 MG: 81 TABLET CHEWABLE at 08:23

## 2025-03-02 RX ADMIN — OXCARBAZEPINE 300 MG: 150 TABLET, FILM COATED ORAL at 08:23

## 2025-03-02 RX ADMIN — HALOPERIDOL 5 MG: 5 TABLET ORAL at 08:24

## 2025-03-02 RX ADMIN — LIDOCAINE 4% 1 PATCH: 40 PATCH TOPICAL at 11:41

## 2025-03-02 RX ADMIN — ALUMINUM HYDROXIDE, MAGNESIUM HYDROXIDE, AND SIMETHICONE 30 ML: 200; 200; 20 SUSPENSION ORAL at 00:25

## 2025-03-02 RX ADMIN — ATENOLOL 25 MG: 25 TABLET ORAL at 08:24

## 2025-03-02 RX ADMIN — IBUPROFEN 400 MG: 200 TABLET, FILM COATED ORAL at 14:34

## 2025-03-02 RX ADMIN — ATORVASTATIN CALCIUM 40 MG: 10 TABLET, FILM COATED ORAL at 08:23

## 2025-03-02 RX ADMIN — HALOPERIDOL 10 MG: 10 TABLET ORAL at 20:08

## 2025-03-02 ASSESSMENT — ACTIVITIES OF DAILY LIVING (ADL)
ADLS_ACUITY_SCORE: 38

## 2025-03-02 NOTE — PLAN OF CARE
Pt awake at start of shift.     Breathing quiet and unlabored when sleeping.     Pt had no c/o pain or discomfort during the HS.     Appears to have slept 5.75 hours.     Pt utilized the following PRN meds:    0025  Maalox 30mL    Patient stated that all PRN medication was effective in alleviating its prescribed use.     Goal Outcome Evaluation:  Problem: Sleep Disturbance  Goal: Adequate Sleep/Rest  Outcome: Progressing

## 2025-03-02 NOTE — PLAN OF CARE
"  Problem: Adult Inpatient Plan of Care  Goal: Readiness for Transition of Care  Outcome: Progressing   Goal Outcome Evaluation:    Plan of Care Reviewed With: patient      Patient was pleasant,calm and cooperative.Patient  was observed sleeping at the beginning of the shift.Patient reported lower back pain,which she states \"manageable.\"Patient denies SI/SIB/HI, anxiety and auditory/visual hallucinations.Patient endorse depression referring to it as \"situational depression.\"She denies all mental health symptoms.Patient expressed her desire to discharge and go home.No PRN medication was given.Patient accepted all scheduled medication with out issues,No medication side effect was reported or observed.Patient eating,100% her dinner and hydrated well.Patient is contracted for safety.Patient was cooperative with vitals,which was stable.Patient was independent in identifying needs known.      /78 (BP Location: Right arm, Patient Position: Sitting, Cuff Size: Adult Regular)   Pulse 68   Temp 97.7  F (36.5  C) (Temporal)   Resp 18   Ht 1.651 m (5' 5\")   Wt 113.9 kg (250 lb 15.9 oz)   SpO2 98%   BMI 41.77 kg/m      "

## 2025-03-02 NOTE — PLAN OF CARE
Problem: Depressive Symptoms  Goal: Depressive Symptoms  Description: Signs and symptoms of listed problems will be absent or manageable.  Outcome: Progressing   Goal Outcome Evaluation:    Plan of Care Reviewed With: patient      Patient alert and ambulatory. She reported low back pain 5/10 (scheduled lidocaine patch applied - pt reported it was helpful). She denied all mental health symptoms. She verbalized her desire to finally go home. She's eating and drinking adequately. She took the shower this morning. Visit with  went well according to patient. At around 1430 hrs, patient c/o low back pain. PRN ibuprofen given.

## 2025-03-02 NOTE — PROGRESS NOTES
Patient and her  approached unit staff and stated that Radha is deathly afraid of of another patient on this unit and is requesting immediate transfer to another unit.

## 2025-03-03 PROBLEM — F30.9 MANIA (H): Status: RESOLVED | Noted: 2025-02-20 | Resolved: 2025-03-03

## 2025-03-03 PROBLEM — R11.10 INTRACTABLE VOMITING: Status: RESOLVED | Noted: 2024-01-02 | Resolved: 2025-03-03

## 2025-03-03 PROCEDURE — 250N000013 HC RX MED GY IP 250 OP 250 PS 637

## 2025-03-03 PROCEDURE — 250N000013 HC RX MED GY IP 250 OP 250 PS 637: Performed by: ANESTHESIOLOGY

## 2025-03-03 PROCEDURE — 99239 HOSP IP/OBS DSCHRG MGMT >30: CPT | Mod: GC | Performed by: PSYCHIATRY & NEUROLOGY

## 2025-03-03 PROCEDURE — 250N000013 HC RX MED GY IP 250 OP 250 PS 637: Performed by: REGISTERED NURSE

## 2025-03-03 RX ORDER — HALOPERIDOL 5 MG/1
5 TABLET ORAL DAILY
Qty: 30 TABLET | Refills: 0 | Status: SHIPPED | OUTPATIENT
Start: 2025-03-03

## 2025-03-03 RX ORDER — OXCARBAZEPINE 300 MG/1
300 TABLET, FILM COATED ORAL 2 TIMES DAILY
Qty: 60 TABLET | Refills: 0 | Status: SHIPPED | OUTPATIENT
Start: 2025-03-03

## 2025-03-03 RX ORDER — HALOPERIDOL 10 MG/1
10 TABLET ORAL AT BEDTIME
Qty: 30 TABLET | Refills: 0 | Status: SHIPPED | OUTPATIENT
Start: 2025-03-03

## 2025-03-03 RX ADMIN — ASPIRIN 81 MG CHEWABLE TABLET 81 MG: 81 TABLET CHEWABLE at 11:15

## 2025-03-03 RX ADMIN — OXCARBAZEPINE 300 MG: 150 TABLET, FILM COATED ORAL at 11:15

## 2025-03-03 RX ADMIN — HALOPERIDOL 5 MG: 5 TABLET ORAL at 11:15

## 2025-03-03 RX ADMIN — ATORVASTATIN CALCIUM 40 MG: 10 TABLET, FILM COATED ORAL at 11:15

## 2025-03-03 RX ADMIN — ATENOLOL 25 MG: 25 TABLET ORAL at 11:15

## 2025-03-03 ASSESSMENT — ACTIVITIES OF DAILY LIVING (ADL)
ADLS_ACUITY_SCORE: 38

## 2025-03-03 NOTE — PLAN OF CARE
Team Note Due:  Monday     Assessment/Intervention/Current Symtoms and Care Coordination:  Chart review and met with team, discussed pt progress, symptomology, and response to treatment.  Discussed the discharge plan and any potential impediments to discharge.    Per team, pt is discharging today. Meds have been ordered to a [facility]'s pharmacy per her preference.   She wants a new psychiatrist, not her regular one. Tasked CC with this.     Behavioral team note complete.     Discharge Plan or Goal:  TBD  Home with supports    Barriers to Discharge:  Manic symptomology      Referral Status:  TBD, Consider [facility] 55+ IOP     Legal Status:  Pt is voluntary.     Contacts (include ANIVAL status):  Primary Care Provider:  Name/Clinic: Paris Vaca MD/[facility]  Number: [phone]     Medication Management/Psychiatry:  Name/Clinic: Dr. Nay Torres MD/[facility]    Number: [phone]     Therapist:  [facility]Cl[facility]  Number: [phone]     Other contact information (family, friends, SO) and ANIVAL status:   Pt signed an ANIVAL for her  Jose.     Upcoming Meetings and Dates/Important Information and next steps:

## 2025-03-03 NOTE — PLAN OF CARE
Care Coordinator Note(s):    Care Request(s):   Psychiatry   Preferences: Time Frame: 3 Weeks  Notes: pt discharging today - she wants to get a new psychiatrist, currently sees Dr Torres        Care Outcome(s):    CC Progress Note(s)/ Documentation:      Appointment: Psychiatry   Date/time: Monday March 23rd, 2025 @ 11:00 am In person  Provider:  Talisha Cruz MS, CNP,RICH,RN  Address: [facility], [address] [place] [address] [address], [place] [address]  Phone: [phone]  Fax: [phone]  Note:   Please plan to arrive 30 minutes prior to your scheduled appointment time so that you will have time to fill out necessary paperwork. Please bring your current insurance card, a list of all your current medications and/or supplements, and name(s) of doctor(s) you have seen in the past.      -Shelly Torres  Adult Behavioral Health Care Coordinator

## 2025-03-03 NOTE — PROVIDER NOTIFICATION
03/03/25 0929   Individualization/Patient Specific Goals   Patient Personal Strengths family/social support;positive attitude   Interprofessional Rounds   Participants nursing;CTC;psychiatrist;pharmacy   Behavioral Team Discussion   Participants MD Mehrdad, Dr Tasneem Ross MD, Pharmacy: [facility], Nursing: Cyn, CTC: [facility]   Progress Per provider, pt is discharging today. She will be returning home with , getting a new psychiatrist, and continuing with regular therapist.   Medical/Physical See H&P   Precautions None   Plan Discharge home today.   Safety Plan To be completed with unit psychotherapist.   Anticipated Discharge Disposition home with family     Goal Outcome Evaluation:                    PRECAUTIONS AND SAFETY    Behavioral Orders   Procedures    Code 1 - Restrict to Unit    Routine Programming     As clinically indicated    Status 15     Every 15 minutes.       Safety  Safety WDL: WDL  Patient Location: hallway, lounge, patient room, own  Observed Behavior: calm, walking  Observed Behavior (Comment): watching TV  Safety Measures: safety rounds completed  Diversional Activity: television  Suicidality: Status 15, Behavioral scrubs (pajamas)

## 2025-03-03 NOTE — PLAN OF CARE
"Radha met with writer and actively participated in the safety planning process. She identified symptoms that may indicate crisis, coping strategies, and no safety concerns, stating, \"I would never want to hurt myself or anybody else.\" Her  arrived to the lobby to pick her up, session was terminated.   "

## 2025-03-03 NOTE — PLAN OF CARE
BEH IP Unit Acuity Rating Score (UARS)  Patient is given one point for every criteria they meet.    CRITERIA SCORING   On a 72 hour hold, court hold, committed, stay of commitment, or revocation. 0    Patient LOS on BEH unit exceeds 20 days. 0  LOS: 9   Patient under guardianship, 55+, otherwise medically complex, or under age 11. 0   Suicide ideation without relief of precipitating factors. 0   Current plan for suicide. 0   Current plan for homicide. 0   Imminent risk or actual attempt to seriously harm another without relief of factors precipitating the attempt. 0   Severe dysfunction in daily living (ex: complete neglect for self care, extreme disruption in vegetative function, extreme deterioration in social interactions). 1   Recent (last 7 days) or current physical aggression in the ED or on unit. 0   Restraints or seclusion episode in past 72 hours. 0   Recent (last 7 days) or current verbal aggression, agitation, yelling, etc., while in the ED or unit. 1   Active psychosis. 1   Need for constant or near constant redirection (from leaving, from others, etc).  1   Intrusive or disruptive behaviors. 1   Patient requires 3 or more hours of individualized nursing care per 8-hour shift (i.e. for ADLs, meds, therapeutic interventions). 0   TOTAL 5

## 2025-03-03 NOTE — PROGRESS NOTES
Discharge note    Discharged to home with  at this time. Radha and her  understand all discharge instructions. 30 day supply of medication sent to outside pharmacy. Radha denies depression/SI/SIB. She is able to commit to safety in the community. She offers no physical concerns. She is alert, orient to self, time and place. She is happy to go home today. No further concerns noted.

## 2025-03-04 NOTE — DISCHARGE SUMMARY
"                                                                                                                 ----------------------------------------------------------------------------------------------------------  [facility]   Psychiatric Discharge Summary      Radha James MRN# [id number]   Age: 59 year old YOB: 1965     Date of Admission:  2/22/2025  Date of Discharge:  3/3/2025 12:33 PM  Admitting Physician:  Dianelys Sears MD  Discharge Physician:  Dianelys Sears MD    This document serves as a transfer of care to Radha James's outpatient providers.     Events Leading to Hospitalization:     \"59-year-old female living in [place] with her .  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  No children. Records indicate  describing patient had an episode 4 years ago that was similar, manic and psychotic admitted to [facility], had a course of ECT until February 2022.  In this case again similar or worse, not sleeping over the last week, becoming more paranoid about neighbors, auditory and visual hallucinations.  Off psychotropic medications for 8 months.  Does use cannabis 4 times a week.  Has a brother that is bipolar.     On interview she she describes her  was getting scared about how she was doing and she did not want to see him suffer so came into the emergency room.She recalls a breakdown in February 2021 precipitated by week of insomnia.  Described doing her regular job and then \"burning the candle at both ends\" trying to solve a \"problem\".  She was described as having psychosis.  She then became quite depressed after being in the hospital on unit 20.  Recalls the ECT may have been helpful for mood to some extent though it was inconvenient, expensive.  May have had some gaps in her short-term memory.  She disputes the " "diagnosis of bipolar though notes her brother is quite ill.  She recalls a number of medications being used but did not particularly help depression.  Quitting her job in May 2023 felt things \"left\".  She recalls also having a heart attack at May at that time stopping her job.  Did try outpatient intensive program on the Zoom but did not like that \"felt repulsive\".  Describes tapering off medications 8 months ago feeling they did not work felt her Dr. Torres of the [facility] did not listen to her.  States her therapist has told her would find a new therapist.     Describes recently may be similar last episode not sleeping as well or needing as much sleep.  Last night did better had something under her tongue (lorazepam).  Her thoughts are not necessarily faster.  Does not ever feel more impulsive with thoughts about sex or spending.  Her mood she described as \"more self-aware, more insight of myself\".  Denies ever having thoughts of suicide.  Is not having auditory visual hallucinations hallucinations.     Reviewing the medications does not recall ever taking lithium, Depakote.  Recalls Latuda.  May have taken lamotrigine.  Does not recall Zyprexa Seroquel or risperidone.     Reports quit smoking cigarettes 9 years ago, then on election night had a relapse smoking 5 packs over the next few weeks.  Uses alcohol infrequently and socially.  May smoke marijuana 3-4 times a week helps her to relax and \"slow down the process\", has not used over the last week.  Does not use other drugs.    See H&P by Dr Sharath MD on 2/23/2025 for additional details.      Diagnoses:   Primary Psychiatric Diagnosis  Bipolar disorder, current episode manic, with psychotic features    Secondary Psychiatric Diagnoses  Anxiety, unspecified   R/O Cannabis Use Disorder    Psychiatric Assessment:   Radha James is a 59 year old female with a history of bipolar disorder with psychotic features - with medical history of CAD, " "NSTEMI (2021 with 70% stenosis), and HLD -  who was admitted on 2/23/25 with concerns of lauren and psychosis in the context of titrating off medications ~1 year ago.   Predominant symptoms this admission included multiple days of reduced sleep followed by mood lability with frequent tearfulness, impulsivity, hyperverbal pressured speech, and psychotic symptoms such as AVH and paranoias of persecution. Per patient and collateral, Rdaha has been adamant even at baseline that she does not have bipolar disorder due to her brother having much more severe symptoms when decompensated. She believes that she was misdiagnosed during her last psychiatrist hospitalization (2021 here at [place]) and that treatment with medications [Zyprexa] caused her to later enter a deep depressive episode which was very traumatic for her and required ECT. This has been an ongoing source of conflict between Radha and her outpatient Psychiatrist as well as her and her  who have tried to  and provide education on multiple instances.  Substance use may be contributing given ongoing marijuana use 3-4x/week to reduce stress. Per available chart review and collateral reports (OP psychiatry notes unavailable), lauren was historically stabilized with 35mg Zyprexa and decompensation to her depression was considered \"treatment resistant\"  requiring ECT as noted above. Notably, per OP Psychiatrist report, patient was resistant to medications for depression during that time.  She then transitioned to Latuda 40mg BID and Lexapro 10mg, however appears to have titrated off all medications by mid 2023 due to clinical stabiility. She historically saw Dr. Nay Torres through [facility] and has an individual therapist. Her  and therapist appears to be her primary social support. Patient reported that symptom onset prior to arrival was 2/2 new onset insomnia that then led to psychosis, ultimately checking herself into the hospital due " "to fear that she was worrying her  too much with present symptoms.     At this time, I do believe that her diagnosis is most consistent with Bipolar I Disorder with current lauren and psychotic features. As Radha's distress and resistance to this topic is quite significant, her treatment did consider alternative diagnoses however these were not supported based on symptom onset/timing during present and historical episodes, biological loading, prior response to medications, and rule out of secondary medical etiologies. Decompensation appears to be secondary to being off psychotropic medications, but may also have been intensified with ongoing cannabis use. Patient does report a history of anxiety paired with frequent ruminations even at baseline, so would encourage a rule out of YULY or other anxiety disorders when better stabilized.    On admission to station 12, Radha agreeable to starting Trilleptal on admission (after refusing Lithium due to a friend's poor experiences and Depakote due to concerns for weight gain). There was initial thoughts to transfer her to a low acuity unit, however on the evening of 2/25 appeared to decompensate further with worsening disorganization/emotional lability, hyperfixation on peers as being \"good\"/\"bad\" (later targeting peers by spilling water on them due to perceiving them as \"energy vampires\"), and paranoia of staff/individuals on TV. As noted above she became quick to anger and aggressive when perceiving any reference to having bipolar disorder, but did ultimately agree to trial Haldol to target her symptoms of psychosis/insomnia after significant reassurance and rapport building. This combination did appear to be effective and well tolerated, as patient began sleeping ~6 hours per night and appeared less paranoid. She did accept a referral to a new outpatient Psychiatry provider (noting that she felt her prior provider did not listen to her/want to engage in further " discussion). As noted above, Radha's biggest barrier to understanding her diagnosis is the core belief that she is not as severe in her symptomology in comparison to her brother and has historically been resistant to explanations that bipolar disorder occurs in varying severities and benefits from treatment like any other medical illness. She notably is very adherent to her non-psychiatric medications and has instituted health changes (such as losing 30lbs, diet changes) to support this with motivation from her , so I do think that rapport paired with education over time and support would prove useful. Prior to discharge, it was communicated to Radha that she should not change or titrate off her current medications until she meets with her new OP Psychiatrist regardless of improvements to sleep/mood to ensure she does not have a relapse of symptoms - she was agreeable to this.    Recommendations for future Psychiatric follow up:  - I suspect that discussion around her diagnosis will continue to be a challenge and source of distress for Radha after discharge. Future providers may benefit from allowing Radha to feel heard in her beliefs (as she frequently cited feeling shut down by medical professionals) as well as gentle education paired with collaboration with her therapist and .  - Continue Trileptal and Haldol after discharge. Consider transition to alternative monotherapy treatments such as Lithium for stabilization if Radha allows as this would be a better first-line option for her. Would not recommend returning to use of antidepressants at this time.  - Consider anxiety disorder rule out once better stabilized.  - Given cardiac history and evidence of metabolic syndrome, close monitoring of labs recommended per clinical guidelines. See medical diagnoses section below for further discussion.     Psychiatric Hospital Course:     Radha James was admitted to Station 12 as a voluntary patient.  "The patient was placed under status 15 (15 minute checks) to ensure patient safety.  2/23: Started on oxcarbazepine 150mg BID  2/24: No medication changes. Evidence of ongoing lauren, engaged in treatment, although remains hyperverbal and intrusive to others with poor boundaries in [place]. Deferred medication changes to provide time to get more collateral from , OP provider due to unclear prior PTA regimen (patient agreeable to this).   2/25: MD notified because  brought Pepto-Bismol to patient from outside the hospital and given it to her, which was later discovered after she had stuffed pills in crevice on chair. Firm education and clarification of unit policies provided to  and patient, visitation rights restricted to main lobby only. No evidence of further medications being given.   2/26: Continuing to only sleep 2-3 hours. Notably more preoccupied, disorganized, and labile today compared to prior. Appears to be more agitated and labile towards others, responding to internal stimuli, and hyperfixating on peers (seeing them as \"good\"/\"bad\",  writing secret letters, banging head on wall when distressed by behaviors, spilling water on another patient due to feeling they were \"an energy vampire\" that couldn't be trusted).  On 2/26, became more verbally aggressive and demonstrated new mistrust of unit/treatment staff, referencing medications being \"poison\" and saying that she will  \"never be taking medication from anyone ever again\". Treatment team had planned to discuss starting Haldol and increasing Trilleptal with her today, however adamantly refused to meet and discuss options.   2/27: Continues to demonstrate symptoms of lauren and psychosis, 1 hr of sleep overnight. Adamantly refusing bipolar disorder diagnosis but does have insight into  insomnia and psychosis, so agreeable to start Haldol 5mg daily + 10mg at bedtime and increase Trilleptal to 300mg.   2/28-3/2: Demonstrating evidence of " improvement, sleep returning to 6hrs. Reduction in paranoia and overall lability.  3/3: Patient requesting discharge at this time. Does appear to closer to baseline, although does demonstrate some subtle hypomania insofar as pressured/hyperverbal speech and trace emotional lability. Denies all symptoms, no longer appears to be responding to internal stimuli and thought patterns more organized/logical compared to prior. Agreeable to patient education on medications and treatment thus far, however continues to express a strong disagreement with prior diagnosis of bipolar disorder so deferred further attempts at education at this time.  Radha was released to home. At the time of discharge she was determined to not be a danger to herself or others.     Risk Assessment:      Today Radha James denies all psychiatric symptoms including SI/HI and AVH. Patient has notable risk factors for self-harm, including impulsivity. However, risk is mitigated by commitment to family, absence of past attempts, and history of seeking help when needed. Therefore, based on all available evidence including the factors cited above, she does not appear to be at imminent risk for self-harm, does not meet criteria for a 72-hr hold, and therefore remains appropriate for ongoing outpatient level of care. Additional steps taken to minimize risk include: medication optimization, close psychiatric follow up and provision of crisis resources. She was also set up with a new OP Psychiatrist with close follow up and follows closely with her therapist on a weekly basis.     Psychiatric Examination:     Mental Status Exam:  Oriented to:  Grossly Oriented  General:  Awake and Alert  Appearance:  appears stated age and Grooming is adequate  Behavior/Attitude:  Calm, Cooperative, and Engaged  Eye Contact: Appropriate  Psychomotor: Normal and No evidence of tics, dystonia, or tardive dyskinesia  no catatonia present  Speech:  appropriate  "volume/tone  Language: Fluent in English with appropriate syntax and vocabulary.  Mood:  \"Much better\"  Affect:  appropriate and animated  Thought Process:  coherent, goal directed, and tangential. Hyperverbal.  Thought Content:   No SI/HI/AH/VH; No apparent delusions  Associations:  intact  Insight:  limited due to denying bipolar diagnosis, minimizing symptoms to core aspects of personality rather than underlying mental illness.   Judgment:  fair due to still being motivated towards treatment with health-oriented behaviors, demonstrated ability to understand clinical benefit of medication adherence, willingness to still engage with OP treatment despite prior experiences.  Impulse control: good, significantly improved  Attention Span:  grossly intact  Concentration:  grossly intact  Recent and Remote Memory:  grossly intact  Fund of Knowledge: average  Muscle Strength and Tone: normal  Gait and Station: Normal     Medical Hospital Course:   Radha James was medically cleared by the ED prior to admission to the unit. PTA medications were continued on admission.     Medical Diagnoses addressed:  # #CAD w/ history of NSTEMI 2021 (70% stenosis)  #HTN  #HLD  #Prediabetes   #Obesity, BMI >40  Follows with PCP, cardiology on outpatient basis.  confirms that patient has been compliant with medications at home. A1C 5.8% this admission (high end of normal to early prediabetes), lipids 07/2024 unremarkable outside of slight elevation in triglycerides. Patient has lost 30lbs while dieting with her  in last year with motivation to improve fitness. Notably did endorse some increased swelling in her legs this admission, which she attributed to diet changes while inpatient. Internal Medicine consult placed due to history, who did corroborate trace pedal edema. They did recommend that patient follow up with her PCP to do a cardiac US on an outpatient basis as well as discuss this with her Cardiologist during her " next follow up appointment. Otherwise no immediate concern. Radha expressed understanding.  -Atenalol 25mg  -Atorvastatin 50mg  -ASA 81mg    Consults: IM as above     Discharge Medications:     Discharge Medication List as of 3/3/2025 11:57 AM        START taking these medications    Details   !! haloperidol (HALDOL) 10 MG tablet Take 1 tablet (10 mg) by mouth at bedtime., Disp-30 tablet, R-0, E-Prescribe      !! haloperidol (HALDOL) 5 MG tablet Take 1 tablet (5 mg) by mouth daily., Disp-30 tablet, R-0, E-Prescribe      OXcarbazepine (TRILEPTAL) 300 MG tablet Take 1 tablet (300 mg) by mouth 2 times daily., Disp-60 tablet, R-0, E-Prescribe       !! - Potential duplicate medications found. Please discuss with provider.        CONTINUE these medications which have NOT CHANGED    Details   amLODIPine (NORVASC) 5 MG tablet Take 5 mg by mouth daily, Historical      atenolol (TENORMIN) 25 MG tablet Take 25 mg by mouth daily, Historical      atorvastatin (LIPITOR) 40 MG tablet Take 40 mg by mouth daily, Historical              Discharge Plan:   Medications as above  Psychiatric Appointments: Date/time: Monday March 23rd, 2025 @ 11:00 am In person  Provider:  Talisha Cruz MS CNP,PMEDINSONP,RN  Address: [facility], [address] [place] [address], [facility] [address] [address]  Phone: [phone]  Fax: [phone]   Psychotherapy Appointments: Follow up with Therapist Ginette per usual schedule  Medical follow up: Follow up with PCP to schedule Cardiac U/S. Will route message to her OP PCP so that they are aware.     Attestations:     Resident without student: This patient was seen and discussed with my attending physician.    Paris Ross MD  Psychiatry Resident, PGY2    This patient has been seen and evaluated by me, Dianelys Sears MD on the date of resident's note. I have discussed this patient with the the resident and I agree with the findings and plan in this note. I have reviewed today's vital signs, medications,  labs and imaging.     > 45 minutes total time that was spent and over 50% of this time was spent in counseling and coordination of care with staff, reviewing medical record, educating patient about treatment options, side effects and benefits and alternative treatments for medications, providing supportive therapy and redirection regarding above symptoms.     This document is created with the help of Dragon dictation system.  All grammatical/typing errors or context distortion are unintentional and inherent to software.

## 2025-03-10 RX ORDER — BENZTROPINE MESYLATE 0.5 MG/1
0.5 TABLET ORAL 2 TIMES DAILY
Qty: 60 TABLET | Refills: 0 | Status: SHIPPED | OUTPATIENT
Start: 2025-03-10

## 2025-08-30 ENCOUNTER — HEALTH MAINTENANCE LETTER (OUTPATIENT)
Age: 60
End: 2025-08-30

## (undated) DEVICE — ENDO FORCEP ENDOJAW BIOPSY 2.8MMX160CM FB-220K

## (undated) DEVICE — SUCTION IRR STRYKERFLOW II W/TIP 250-070-520

## (undated) DEVICE — LINEN POUCH DBL 5427

## (undated) DEVICE — GLOVE BIOGEL PI MICRO SZ 8.0 48580

## (undated) DEVICE — ESU GROUND PAD ADULT W/CORD E7507

## (undated) DEVICE — KIT ENDO TURNOVER/PROCEDURE W/CLEAN A SCOPE LINERS 103888

## (undated) DEVICE — ENDO TROCAR FIRST ENTRY KII FIOS Z-THRD 05X100MM CTF03

## (undated) DEVICE — LINEN TOWEL PACK X5 5464

## (undated) DEVICE — ESU CORD MONOPOLAR 10'  E0510

## (undated) DEVICE — BAG CLEAR TRASH 1.3M 39X33" P4040C

## (undated) DEVICE — PREP CHLORAPREP 26ML TINTED HI-LITE ORANGE 930815

## (undated) DEVICE — LINEN HALF SHEET 5512

## (undated) DEVICE — ESU PENCIL W/HOLSTER E2350H

## (undated) DEVICE — SU VICRYL 0 CT-2 CR 8X18" J727D

## (undated) DEVICE — SOL NACL 0.9% IRRIG 1000ML BOTTLE 2F7124

## (undated) DEVICE — MANIFOLD NEPTUNE 4 PORT 700-20

## (undated) DEVICE — GLOVE BIOGEL PI MICRO SZ 7.5 48575

## (undated) DEVICE — ENDO POUCH UNIV RETRIEVAL SYSTEM INZII 10MM CD001

## (undated) DEVICE — ESU ELEC BLADE 2.75" COATED/INSULATED E1455

## (undated) DEVICE — DECANTER BAG 2002S

## (undated) DEVICE — ENDO TROCAR SLEEVE KII Z-THREADED 05X100MM CTS02

## (undated) DEVICE — SOL NACL 0.9% IRRIG 3000ML BAG 2B7477

## (undated) DEVICE — Device

## (undated) DEVICE — CLIP APPLIER ENDO 5MM M/L LIGAMAX EL5ML

## (undated) DEVICE — ENDO TROCAR OPTICAL ACCESS KII Z-THRD 12X100MM C0R85

## (undated) DEVICE — LINEN FULL SHEET 5511

## (undated) DEVICE — SU VICRYL 4-0 P-3 18" UND J494G

## (undated) RX ORDER — FENTANYL CITRATE 50 UG/ML
INJECTION, SOLUTION INTRAMUSCULAR; INTRAVENOUS
Status: DISPENSED
Start: 2024-01-29

## (undated) RX ORDER — PROPOFOL 10 MG/ML
INJECTION, EMULSION INTRAVENOUS
Status: DISPENSED
Start: 2024-01-29

## (undated) RX ORDER — DEXAMETHASONE SODIUM PHOSPHATE 4 MG/ML
INJECTION, SOLUTION INTRA-ARTICULAR; INTRALESIONAL; INTRAMUSCULAR; INTRAVENOUS; SOFT TISSUE
Status: DISPENSED
Start: 2024-01-29

## (undated) RX ORDER — BUPIVACAINE HYDROCHLORIDE AND EPINEPHRINE 5; 5 MG/ML; UG/ML
INJECTION, SOLUTION EPIDURAL; INTRACAUDAL; PERINEURAL
Status: DISPENSED
Start: 2024-01-29

## (undated) RX ORDER — FENTANYL CITRATE-0.9 % NACL/PF 10 MCG/ML
PLASTIC BAG, INJECTION (ML) INTRAVENOUS
Status: DISPENSED
Start: 2024-01-29

## (undated) RX ORDER — SCOLOPAMINE TRANSDERMAL SYSTEM 1 MG/1
PATCH, EXTENDED RELEASE TRANSDERMAL
Status: DISPENSED
Start: 2024-01-29

## (undated) RX ORDER — KETOROLAC TROMETHAMINE 30 MG/ML
INJECTION, SOLUTION INTRAMUSCULAR; INTRAVENOUS
Status: DISPENSED
Start: 2024-01-29

## (undated) RX ORDER — INDOCYANINE GREEN AND WATER 25 MG
KIT INJECTION
Status: DISPENSED
Start: 2024-01-29

## (undated) RX ORDER — LIDOCAINE HYDROCHLORIDE 10 MG/ML
INJECTION, SOLUTION EPIDURAL; INFILTRATION; INTRACAUDAL; PERINEURAL
Status: DISPENSED
Start: 2024-01-29

## (undated) RX ORDER — CEFAZOLIN SODIUM/WATER 2 G/20 ML
SYRINGE (ML) INTRAVENOUS
Status: DISPENSED
Start: 2024-01-29

## (undated) RX ORDER — ACETAMINOPHEN 325 MG/1
TABLET ORAL
Status: DISPENSED
Start: 2024-01-29

## (undated) RX ORDER — ASPIRIN 81 MG/1
TABLET, CHEWABLE ORAL
Status: DISPENSED
Start: 2024-01-29

## (undated) RX ORDER — FENTANYL CITRATE 50 UG/ML
INJECTION, SOLUTION INTRAMUSCULAR; INTRAVENOUS
Status: DISPENSED
Start: 2024-01-03

## (undated) RX ORDER — ONDANSETRON 2 MG/ML
INJECTION INTRAMUSCULAR; INTRAVENOUS
Status: DISPENSED
Start: 2024-01-29